# Patient Record
Sex: MALE | Race: WHITE | NOT HISPANIC OR LATINO | Employment: FULL TIME | URBAN - METROPOLITAN AREA
[De-identification: names, ages, dates, MRNs, and addresses within clinical notes are randomized per-mention and may not be internally consistent; named-entity substitution may affect disease eponyms.]

---

## 2019-01-17 ENCOUNTER — TRANSCRIBE ORDERS (OUTPATIENT)
Dept: ADMINISTRATIVE | Facility: HOSPITAL | Age: 49
End: 2019-01-17

## 2019-01-17 ENCOUNTER — HOSPITAL ENCOUNTER (OUTPATIENT)
Dept: RADIOLOGY | Facility: HOSPITAL | Age: 49
Discharge: HOME | End: 2019-01-17
Attending: FAMILY MEDICINE
Payer: COMMERCIAL

## 2019-01-17 DIAGNOSIS — M54.5 LOW BACK PAIN, UNSPECIFIED BACK PAIN LATERALITY, UNSPECIFIED CHRONICITY, WITH SCIATICA PRESENCE UNSPECIFIED: ICD-10-CM

## 2019-01-17 DIAGNOSIS — M54.5 LOW BACK PAIN, UNSPECIFIED BACK PAIN LATERALITY, UNSPECIFIED CHRONICITY, WITH SCIATICA PRESENCE UNSPECIFIED: Primary | ICD-10-CM

## 2019-01-17 PROCEDURE — 72100 X-RAY EXAM L-S SPINE 2/3 VWS: CPT

## 2019-01-17 PROCEDURE — 72202 X-RAY EXAM SI JOINTS 3/> VWS: CPT

## 2019-09-06 ENCOUNTER — HOSPITAL ENCOUNTER (EMERGENCY)
Facility: HOSPITAL | Age: 49
Discharge: HOME | End: 2019-09-06
Attending: EMERGENCY MEDICINE | Admitting: EMERGENCY MEDICINE
Payer: COMMERCIAL

## 2019-09-06 ENCOUNTER — APPOINTMENT (EMERGENCY)
Dept: CT IMAGING | Facility: HOSPITAL | Age: 49
End: 2019-09-06
Payer: COMMERCIAL

## 2019-09-06 VITALS
OXYGEN SATURATION: 97 % | SYSTOLIC BLOOD PRESSURE: 184 MMHG | TEMPERATURE: 97.2 F | BODY MASS INDEX: 37.8 KG/M2 | DIASTOLIC BLOOD PRESSURE: 74 MMHG | HEIGHT: 71 IN | HEART RATE: 68 BPM | WEIGHT: 270 LBS | RESPIRATION RATE: 18 BRPM

## 2019-09-06 DIAGNOSIS — R10.9 RIGHT FLANK PAIN: Primary | ICD-10-CM

## 2019-09-06 DIAGNOSIS — S39.012A LUMBAR STRAIN, INITIAL ENCOUNTER: ICD-10-CM

## 2019-09-06 LAB
ALBUMIN SERPL BCP-MCNC: 4.1 G/DL (ref 3.5–5.7)
ALP SERPL-CCNC: 86 U/L (ref 40–150)
ALT SERPL W P-5'-P-CCNC: 49 U/L (ref 7–52)
ANION GAP SERPL CALCULATED.3IONS-SCNC: 5 MMOL/L (ref 4–13)
AST SERPL W P-5'-P-CCNC: 49 U/L (ref 13–39)
BASOPHILS # BLD AUTO: 0 THOUSANDS/ΜL (ref 0–0.1)
BASOPHILS NFR BLD AUTO: 1 % (ref 0–2)
BILIRUB SERPL-MCNC: 0.6 MG/DL (ref 0.2–1)
BILIRUB UR QL STRIP: NEGATIVE
BUN SERPL-MCNC: 10 MG/DL (ref 7–25)
CALCIUM SERPL-MCNC: 9.3 MG/DL (ref 8.6–10.5)
CHLORIDE SERPL-SCNC: 99 MMOL/L (ref 98–107)
CLARITY UR: CLEAR
CO2 SERPL-SCNC: 32 MMOL/L (ref 21–31)
COLOR UR: YELLOW
CREAT SERPL-MCNC: 0.97 MG/DL (ref 0.7–1.3)
EOSINOPHIL # BLD AUTO: 0.1 THOUSAND/ΜL (ref 0–0.61)
EOSINOPHIL NFR BLD AUTO: 2 % (ref 0–5)
ERYTHROCYTE [DISTWIDTH] IN BLOOD BY AUTOMATED COUNT: 15.5 % (ref 11.5–14.5)
GFR SERPL CREATININE-BSD FRML MDRD: 93 ML/MIN/1.73SQ M
GIANT PLATELETS BLD QL SMEAR: PRESENT
GLUCOSE SERPL-MCNC: 203 MG/DL (ref 65–99)
GLUCOSE UR STRIP-MCNC: NEGATIVE MG/DL
HCT VFR BLD AUTO: 49 % (ref 42–47)
HGB BLD-MCNC: 16 G/DL (ref 14–18)
HGB UR QL STRIP.AUTO: NEGATIVE
KETONES UR STRIP-MCNC: NEGATIVE MG/DL
LEUKOCYTE ESTERASE UR QL STRIP: NEGATIVE
LYMPHOCYTES # BLD AUTO: 1.1 THOUSANDS/ΜL (ref 0.6–4.47)
LYMPHOCYTES NFR BLD AUTO: 27 % (ref 21–51)
MCH RBC QN AUTO: 28.3 PG (ref 26–34)
MCHC RBC AUTO-ENTMCNC: 32.7 G/DL (ref 31–37)
MCV RBC AUTO: 87 FL (ref 81–99)
MONOCYTES # BLD AUTO: 0.5 THOUSAND/ΜL (ref 0.17–1.22)
MONOCYTES NFR BLD AUTO: 13 % (ref 2–12)
NEUTROPHILS # BLD AUTO: 2.4 THOUSANDS/ΜL (ref 1.4–6.5)
NEUTS SEG NFR BLD AUTO: 57 % (ref 42–75)
NITRITE UR QL STRIP: NEGATIVE
PH UR STRIP.AUTO: 6 [PH]
PLATELET # BLD AUTO: 67 THOUSANDS/UL (ref 149–390)
PLATELET BLD QL SMEAR: ADEQUATE
PMV BLD AUTO: 9.7 FL (ref 8.6–11.7)
POTASSIUM SERPL-SCNC: 4 MMOL/L (ref 3.5–5.5)
PROT SERPL-MCNC: 7.6 G/DL (ref 6.4–8.9)
PROT UR STRIP-MCNC: NEGATIVE MG/DL
RBC # BLD AUTO: 5.67 MILLION/UL (ref 4.3–5.9)
RBC MORPH BLD: NORMAL
SODIUM SERPL-SCNC: 136 MMOL/L (ref 134–143)
SP GR UR STRIP.AUTO: 1.01 (ref 1–1.03)
UROBILINOGEN UR QL STRIP.AUTO: 0.2 E.U./DL
WBC # BLD AUTO: 4.1 THOUSAND/UL (ref 4.8–10.8)

## 2019-09-06 PROCEDURE — 85025 COMPLETE CBC W/AUTO DIFF WBC: CPT | Performed by: EMERGENCY MEDICINE

## 2019-09-06 PROCEDURE — 74176 CT ABD & PELVIS W/O CONTRAST: CPT

## 2019-09-06 PROCEDURE — 80053 COMPREHEN METABOLIC PANEL: CPT | Performed by: EMERGENCY MEDICINE

## 2019-09-06 PROCEDURE — 36415 COLL VENOUS BLD VENIPUNCTURE: CPT | Performed by: EMERGENCY MEDICINE

## 2019-09-06 PROCEDURE — 96374 THER/PROPH/DIAG INJ IV PUSH: CPT

## 2019-09-06 PROCEDURE — 81003 URINALYSIS AUTO W/O SCOPE: CPT | Performed by: EMERGENCY MEDICINE

## 2019-09-06 PROCEDURE — 96361 HYDRATE IV INFUSION ADD-ON: CPT

## 2019-09-06 PROCEDURE — 99284 EMERGENCY DEPT VISIT MOD MDM: CPT

## 2019-09-06 RX ORDER — CYCLOBENZAPRINE HCL 10 MG
10 TABLET ORAL 2 TIMES DAILY PRN
Qty: 20 TABLET | Refills: 0 | Status: SHIPPED | OUTPATIENT
Start: 2019-09-06 | End: 2019-10-24

## 2019-09-06 RX ORDER — KETOROLAC TROMETHAMINE 30 MG/ML
30 INJECTION, SOLUTION INTRAMUSCULAR; INTRAVENOUS ONCE
Status: COMPLETED | OUTPATIENT
Start: 2019-09-06 | End: 2019-09-06

## 2019-09-06 RX ORDER — NAPROXEN 500 MG/1
500 TABLET ORAL 2 TIMES DAILY WITH MEALS
Qty: 30 TABLET | Refills: 0 | Status: SHIPPED | OUTPATIENT
Start: 2019-09-06 | End: 2019-10-24

## 2019-09-06 RX ADMIN — KETOROLAC TROMETHAMINE 30 MG: 30 INJECTION, SOLUTION INTRAMUSCULAR; INTRAVENOUS at 15:12

## 2019-09-06 RX ADMIN — SODIUM CHLORIDE 1000 ML: 0.9 INJECTION, SOLUTION INTRAVENOUS at 15:12

## 2019-09-06 NOTE — ED PROVIDER NOTES
History  Chief Complaint   Patient presents with    Flank Pain     pt started with right lower back/flank pain 2 days ago, pain wraps around to right abd  Patient is a 77-year-old male with history of kidney stones the distant past who says he was moving furniture several days ago and now has right flank pain  The pain radiates to his right anterior abdomen  He has been nauseous has not vomited  He denies any frequency urgency or dysuria  Patient has no bowel complaints  He has been afebrile  None       Past Medical History:   Diagnosis Date    Diabetes mellitus ([address] )     Kidney stones        Past Surgical History:   Procedure Laterality Date    CHOLECYSTECTOMY      NECK SURGERY         History reviewed  No pertinent family history  I have reviewed and agree with the history as documented  Social History     Tobacco Use    Smoking status: Never Smoker    Smokeless tobacco: Never Used   Substance Use Topics    Alcohol use: Never     Frequency: Never    Drug use: Never        Review of Systems   Constitutional: Negative for chills, fatigue, fever and unexpected weight change  HENT: Negative for congestion and nosebleeds  Eyes: Negative for visual disturbance  Respiratory: Negative for chest tightness and shortness of breath  Cardiovascular: Negative for chest pain, palpitations and leg swelling  Gastrointestinal: Negative for abdominal pain, blood in stool, diarrhea, nausea and vomiting  Endocrine: Negative for cold intolerance and heat intolerance  Genitourinary: Negative for difficulty urinating  Musculoskeletal: Negative for arthralgias, back pain, gait problem, joint swelling and myalgias  Skin: Negative for rash  Neurological: Negative for dizziness, speech difficulty, weakness and headaches  Psychiatric/Behavioral: Negative for behavioral problems, confusion, self-injury and suicidal ideas  All other systems reviewed and are negative        Physical Exam  Physical Exam   Constitutional: He is oriented to person, place, and time  He appears well-developed and well-nourished  HENT:   Head: Normocephalic and atraumatic  Nose: Nose normal    Eyes: Pupils are equal, round, and reactive to light  EOM are normal    Neck: Normal range of motion  Neck supple  Cardiovascular: Normal rate, regular rhythm and normal heart sounds  Exam reveals no gallop and no friction rub  No murmur heard  Pulmonary/Chest: Effort normal and breath sounds normal  No respiratory distress  He has no wheezes  He has no rales  Abdominal: Soft  He exhibits no distension  There is no tenderness  There is no rebound and no guarding  No CVA tenderness   Musculoskeletal: Normal range of motion  He exhibits no edema  Neurological: He is alert and oriented to person, place, and time  Skin: Skin is warm and dry  Psychiatric: He has a normal mood and affect  His behavior is normal  Judgment and thought content normal    Nursing note and vitals reviewed        Vital Signs  ED Triage Vitals [09/06/19 1432]   Temperature Pulse Respirations Blood Pressure SpO2   (!) 97 2 °F (36 2 °C) 68 18 (!) 184/74 97 %      Temp Source Heart Rate Source Patient Position - Orthostatic VS BP Location FiO2 (%)   Temporal Monitor Sitting Left arm --      Pain Score       4           Vitals:    09/06/19 1432   BP: (!) 184/74   Pulse: 68   Patient Position - Orthostatic VS: Sitting         Visual Acuity      ED Medications  Medications   sodium chloride 0 9 % bolus 1,000 mL (has no administration in time range)   ketorolac (TORADOL) injection 30 mg (has no administration in time range)       Diagnostic Studies  Results Reviewed     Procedure Component Value Units Date/Time    CBC and differential [[id number]]     Lab Status:  No result Specimen:  Blood     Comprehensive metabolic panel [[id number]]     Lab Status:  No result Specimen:  Blood     UA (URINE) with reflex to Microscopic [[id number]]  (Normal) Collected:  09/06/19 1444    Lab Status:  Final result Specimen:  Urine, Clean Catch Updated:  09/06/19 1454     Color, UA Yellow     Clarity, UA Clear     Specific Gravity, UA 1 010     pH, UA 6 0     Leukocytes, UA Negative     Nitrite, UA Negative     Protein, UA Negative mg/dl      Glucose, UA Negative mg/dl      Ketones, UA Negative mg/dl      Urobilinogen, UA 0 2 E U /dl      Bilirubin, UA Negative     Blood, UA Negative                 CT renal stone study abdomen pelvis without contrast    (Results Pending)              Procedures  Procedures       ED Course  ED Course as of Sep 06 1714   Fri Sep 06, 2019   1608   Patient's CT scan does not explain the patient's flank pain  This is likely muscle skeletal   Will discharge home with nonsteroidals and follow up with his PMD    CT renal stone study abdomen pelvis without contrast                               MDM    Disposition  Final diagnoses:   None     ED Disposition     None      Follow-up Information    None         Patient's Medications    No medications on file     No discharge procedures on file      ED Provider  Electronically Signed by           John Taylor MD  09/06/19 2968

## 2019-10-24 ENCOUNTER — OFFICE VISIT (OUTPATIENT)
Dept: INTERNAL MEDICINE CLINIC | Facility: CLINIC | Age: 49
End: 2019-10-24
Payer: COMMERCIAL

## 2019-10-24 VITALS
RESPIRATION RATE: 18 BRPM | BODY MASS INDEX: 37.41 KG/M2 | HEIGHT: 72 IN | HEART RATE: 66 BPM | TEMPERATURE: 98.6 F | WEIGHT: 276.2 LBS | OXYGEN SATURATION: 98 % | DIASTOLIC BLOOD PRESSURE: 80 MMHG | SYSTOLIC BLOOD PRESSURE: 154 MMHG

## 2019-10-24 DIAGNOSIS — L84 CALLUS OF FOOT: ICD-10-CM

## 2019-10-24 DIAGNOSIS — Z01.00 DIABETIC EYE EXAM (HCC): ICD-10-CM

## 2019-10-24 DIAGNOSIS — E78.2 MIXED HYPERLIPIDEMIA: ICD-10-CM

## 2019-10-24 DIAGNOSIS — Z12.5 SCREENING FOR PROSTATE CANCER: ICD-10-CM

## 2019-10-24 DIAGNOSIS — I10 ESSENTIAL HYPERTENSION: ICD-10-CM

## 2019-10-24 DIAGNOSIS — Z13.220 SCREENING FOR LIPID DISORDERS: ICD-10-CM

## 2019-10-24 DIAGNOSIS — E11.9 ENCOUNTER FOR DIABETIC FOOT EXAM (HCC): ICD-10-CM

## 2019-10-24 DIAGNOSIS — E11.9 DIABETIC EYE EXAM (HCC): ICD-10-CM

## 2019-10-24 DIAGNOSIS — Z23 ENCOUNTER FOR VACCINATION: ICD-10-CM

## 2019-10-24 DIAGNOSIS — E11.9 TYPE 2 DIABETES MELLITUS WITHOUT COMPLICATION, WITHOUT LONG-TERM CURRENT USE OF INSULIN (HCC): Primary | ICD-10-CM

## 2019-10-24 DIAGNOSIS — E66.01 SEVERE OBESITY (BMI 35.0-39.9) WITH COMORBIDITY (HCC): ICD-10-CM

## 2019-10-24 DIAGNOSIS — M47.12 CERVICAL SPONDYLOSIS WITH MYELOPATHY: ICD-10-CM

## 2019-10-24 DIAGNOSIS — Z13.29 SCREENING FOR THYROID DISORDER: ICD-10-CM

## 2019-10-24 DIAGNOSIS — Z13.31 NEGATIVE DEPRESSION SCREENING: ICD-10-CM

## 2019-10-24 DIAGNOSIS — Z13.0 SCREENING FOR DEFICIENCY ANEMIA: ICD-10-CM

## 2019-10-24 PROBLEM — M50.30 DDD (DEGENERATIVE DISC DISEASE), CERVICAL: Status: ACTIVE | Noted: 2019-10-24

## 2019-10-24 LAB — SL AMB POCT GLUCOSE BLD: 191

## 2019-10-24 PROCEDURE — 90715 TDAP VACCINE 7 YRS/> IM: CPT | Performed by: INTERNAL MEDICINE

## 2019-10-24 PROCEDURE — 90471 IMMUNIZATION ADMIN: CPT | Performed by: INTERNAL MEDICINE

## 2019-10-24 PROCEDURE — 93000 ELECTROCARDIOGRAM COMPLETE: CPT | Performed by: INTERNAL MEDICINE

## 2019-10-24 PROCEDURE — 90472 IMMUNIZATION ADMIN EACH ADD: CPT | Performed by: INTERNAL MEDICINE

## 2019-10-24 PROCEDURE — 99204 OFFICE O/P NEW MOD 45 MIN: CPT | Performed by: INTERNAL MEDICINE

## 2019-10-24 PROCEDURE — 82948 REAGENT STRIP/BLOOD GLUCOSE: CPT | Performed by: INTERNAL MEDICINE

## 2019-10-24 PROCEDURE — 90686 IIV4 VACC NO PRSV 0.5 ML IM: CPT | Performed by: INTERNAL MEDICINE

## 2019-10-24 NOTE — PATIENT INSTRUCTIONS
Obesity   AMBULATORY CARE:   Obesity  is when your body mass index (BMI) is greater than 30  Your healthcare provider will use your height and weight to measure your BMI  The risks of obesity include  many health problems, such as injuries or physical disability  You may need tests to check for the following:  · Diabetes     · High blood pressure or high cholesterol     · Heart disease     · Gallbladder or liver disease     · Cancer of the colon, breast, prostate, liver, or kidney     · Sleep apnea     · Arthritis or gout  Seek care immediately if:   · You have a severe headache, confusion, or difficulty speaking  · You have weakness on one side of your body  · You have chest pain, sweating, or shortness of breath  Contact your healthcare provider if:   · You have symptoms of gallbladder or liver disease, such as pain in your upper abdomen  · You have knee or hip pain and discomfort while walking  · You have symptoms of diabetes, such as intense hunger and thirst, and frequent urination  · You have symptoms of sleep apnea, such as snoring or daytime sleepiness  · You have questions or concerns about your condition or care  Treatment for obesity  focuses on helping you lose weight to improve your health  Even a small decrease in BMI can reduce the risk for many health problems  Your healthcare provider will help you set a weight-loss goal   · Lifestyle changes  are the first step in treating obesity  These include making healthy food choices and getting regular physical activity  Your healthcare provider may suggest a weight-loss program that involves coaching, education, and therapy  · Medicine  may help you lose weight when it is used with a healthy diet and physical activity  · Surgery  can help you lose weight if you are very obese and have other health problems  There are several types of weight-loss surgery  Ask your healthcare provider for more information    Be successful losing weight:   · Set small, realistic goals  An example of a small goal is to walk for 20 minutes 5 days a week  Anther goal is to lose 5% of your body weight  · Tell friends, family members, and coworkers about your goals  and ask for their support  Ask a friend to lose weight with you, or join a weight-loss support group  · Identify foods or triggers that may cause you to overeat , and find ways to avoid them  Remove tempting high-calorie foods from your home and workplace  Place a bowl of fresh fruit on your kitchen counter  If stress causes you to eat, then find other ways to cope with stress  · Keep a diary to track what you eat and drink  Also write down how many minutes of physical activity you do each day  Weigh yourself once a week and record it in your diary  Eating changes: You will need to eat 500 to 1,000 fewer calories each day than you currently eat to lose 1 to 2 pounds a week  The following changes will help you cut calories:  · Eat smaller portions  Use small plates, no larger than 9 inches in diameter  Fill your plate half full of fruits and vegetables  Measure your food using measuring cups until you know what a serving size looks like  · Eat 3 meals and 1 or 2 snacks each day  Plan your meals in advance  Rebekah Wesley and eat at home most of the time  Eat slowly  · Eat fruits and vegetables at every meal   They are low in calories and high in fiber, which makes you feel full  Do not add butter, margarine, or cream sauce to vegetables  Use herbs to season steamed vegetables  · Eat less fat and fewer fried foods  Eat more baked or grilled chicken and fish  These protein sources are lower in calories and fat than red meat  Limit fast food  Dress your salads with olive oil and vinegar instead of bottled dressing  · Limit the amount of sugar you eat  Do not drink sugary beverages  Limit alcohol  Activity changes:  Physical activity is good for your body in many ways   It helps you burn calories and build strong muscles  It decreases stress and depression, and improves your mood  It can also help you sleep better  Talk to your healthcare provider before you begin an exercise program   · Exercise for at least 30 minutes 5 days a week  Start slowly  Set aside time each day for physical activity that you enjoy and that is convenient for you  It is best to do both weight training and an activity that increases your heart rate, such as walking, bicycling, or swimming  · Find ways to be more active  Do yard work and housecleaning  Walk up the stairs instead of using elevators  Spend your leisure time going to events that require walking, such as outdoor festivals or fairs  This extra physical activity can help you lose weight and keep it off  Follow up with your healthcare provider as directed: You may need to meet with a dietitian  Write down your questions so you remember to ask them during your visits  © 2017 [facility] [employer] Information is for End User's use only and may not be sold, redistributed or otherwise used for commercial purposes  All illustrations and images included in CareNotes® are the copyrighted property of [employer]  or Travon Castellanos  The above information is an  only  It is not intended as medical advice for individual conditions or treatments  Talk to your doctor, nurse or pharmacist before following any medical regimen to see if it is safe and effective for you  Low Fat Diet   AMBULATORY CARE:   A low-fat diet  is an eating plan that is low in total fat, unhealthy fat, and cholesterol  You may need to follow a low-fat diet if you have trouble digesting or absorbing fat  You may also need to follow this diet if you have high cholesterol  You can also lower your cholesterol by increasing the amount of fiber in your diet  Soluble fiber is a type of fiber that helps to decrease cholesterol levels     Different types of fat in food:   · Limit unhealthy fats  A diet that is high in cholesterol, saturated fat, and trans fat may cause unhealthy cholesterol levels  Unhealthy cholesterol levels increase your risk of heart disease  ¨ Cholesterol:  Limit intake of cholesterol to less than 200 mg per day  Cholesterol is found in meat, eggs, and dairy  ¨ Saturated fat:  Limit saturated fat to less than 7% of your total daily calories  Ask your dietitian how many calories you need each day  Saturated fat is found in butter, cheese, ice cream, whole milk, and palm oil  Saturated fat is also found in meat, such as beef, pork, chicken skin, and processed meats  Processed meats include sausage, hot dogs, and bologna  ¨ Trans fat:  Avoid trans fat as much as possible  Trans fat is used in fried and baked foods  Foods that say trans fat free on the label may still have up to 0 5 grams of trans fat per serving  · Include healthy fats  Replace foods that are high in saturated and trans fat with foods high in healthy fats  This may help to decrease high cholesterol levels  ¨ Monounsaturated fats: These are found in avocados, nuts, and vegetable oils, such as olive, canola, and sunflower oil  ¨ Polyunsaturated fats: These can be found in vegetable oils, such as soybean or corn oil  Omega-3 fats can help to decrease the risk of heart disease  Omega-3 fats are found in fish, such as salmon, herring, trout, and tuna  Omega-3 fats can also be found in plant foods, such as walnuts, flaxseed, soybeans, and canola oil    Foods to limit or avoid:   · Grains:      ¨ Snacks that are made with partially hydrogenated oils, such as chips, regular crackers, and butter-flavored popcorn    ¨ High-fat baked goods, such as biscuits, croissants, doughnuts, pies, cookies, and pastries    · Dairy:      ¨ Whole milk, 2% milk, and yogurt and ice cream made with whole milk    ¨ Half and half creamer, heavy cream, and whipping cream    ¨ Cheese, cream cheese, and sour cream    · Meats and proteins:      ¨ High-fat cuts of meat (T-bone steak, regular hamburger, and ribs)    ¨ Fried meat, poultry (turkey and chicken), and fish    ¨ Poultry (chicken and turkey) with skin    ¨ Cold cuts (salami or bologna), hot dogs, kerns, and sausage    ¨ Whole eggs and egg yolks    · Vegetables and fruits with added fat:      ¨ Fried vegetables or vegetables in butter or high-fat sauces, such as cream or cheese sauces    ¨ Fried fruit or fruit served with butter or cream    · Fats:      ¨ Butter, stick margarine, and shortening    ¨ Coconut, palm oil, and palm kernel oil  Foods to include:   · Grains:      ¨ Whole-grain breads, cereals, pasta, and brown rice    ¨ Low-fat crackers and pretzels    · Vegetables and fruits:      ¨ Fresh, frozen, or canned vegetables (no salt or low-sodium)    ¨ Fresh, frozen, dried, or canned fruit (canned in light syrup or fruit juice)    ¨ Avocado    · Low-fat dairy products:      ¨ Nonfat (skim) or 1% milk    ¨ Nonfat or low-fat cheese, yogurt, and cottage cheese    · Meats and proteins:      ¨ Chicken or turkey with no skin    ¨ Baked or broiled fish    ¨ Lean beef and pork (loin, round, extra lean hamburger)    ¨ Beans and peas, unsalted nuts, soy products    ¨ Egg whites and substitutes    ¨ Seeds and nuts    · Fats:      ¨ Unsaturated oil, such as canola, olive, peanut, soybean, or sunflower oil    ¨ Soft or liquid margarine and vegetable oil spread    ¨ Low-fat salad dressing  Other ways to decrease fat:   · Read food labels before you buy foods  Choose foods that have less than 30% of calories from fat  Choose low-fat or fat-free dairy products  Remember that fat free does not mean calorie free  These foods still contain calories, and too many calories can lead to weight gain  · Trim fat from meat and avoid fried food  Trim all visible fat from meat before you cook it  Remove the skin from poultry  Do not vivas meat, fish, or poultry  Bake, roast, boil, or broil these foods instead  Avoid fried foods  Eat a baked potato instead of [place] fries  Steam vegetables instead of sautéing them in butter  · Add less fat to foods  Use imitation kerns bits on salads and baked potatoes instead of regular kerns bits  Use fat-free or low-fat salad dressings instead of regular dressings  Use low-fat or nonfat butter-flavored topping instead of regular butter or margarine on popcorn and other foods  Ways to decrease fat in recipes:  Replace high-fat ingredients with low-fat or nonfat ones  This may cause baked goods to be drier than usual  You may need to use nonfat cooking spray on pans to prevent food from sticking  You also may need to change the amount of other ingredients, such as water, in the recipe  Try the following:  · Use low-fat or light margarine instead of regular margarine or shortening  · Use lean ground turkey breast or chicken, or lean ground beef (less than 5% fat) instead of hamburger  · Add 1 teaspoon of canola oil to 8 ounces of skim milk instead of using cream or half and half  · Use grated zucchini, carrots, or apples in breads instead of coconut  · Use blenderized, low-fat cottage cheese, plain tofu, or low-fat ricotta cheese instead of cream cheese  · Use 1 egg white and 1 teaspoon of canola oil, or use ¼ cup (2 ounces) of fat-free egg substitute instead of a whole egg  · Replace half of the oil that is called for in a recipe with applesauce when you bake  Use 3 tablespoons of cocoa powder and 1 tablespoon of canola oil instead of a square of baking chocolate  How to increase fiber:  Eat enough high-fiber foods to get 20 to 30 grams of fiber every day  Slowly increase your fiber intake to avoid stomach cramps, gas, and other problems  · Eat 3 ounces of whole-grain foods each day  An ounce is about 1 slice of bread  Eat whole-grain breads, such as whole-wheat bread   Whole wheat, whole-wheat flour, or other whole grains should be listed as the first ingredient on the food label  Replace white flour with whole-grain flour or use half of each in recipes  Whole-grain flour is heavier than white flour, so you may have to add more yeast or baking powder  · Eat a high-fiber cereal for breakfast   Oatmeal is a good source of soluble fiber  Look for cereals that have bran or fiber in the name  Choose whole-grain products, such as brown rice, barley, and whole-wheat pasta  · Eat more beans, peas, and lentils  For example, add beans to soups or salads  Eat at least 5 cups of fruits and vegetables each day  Eat fruits and vegetables with the peel because the peel is high in fiber  © 2017 [facility] Information is for End User's use only and may not be sold, redistributed or otherwise used for commercial purposes  All illustrations and images included in CareNotes® are the copyrighted property of [employer]  or Travon Castellanos  The above information is an  only  It is not intended as medical advice for individual conditions or treatments  Talk to your doctor, nurse or pharmacist before following any medical regimen to see if it is safe and effective for you  Heart Healthy Diet   AMBULATORY CARE:   A heart healthy diet  is an eating plan low in total fat, unhealthy fats, and sodium (salt)  A heart healthy diet helps decrease your risk for heart disease and stroke  Limit the amount of fat you eat to 25% to 35% of your total daily calories  Limit sodium to less than 2,300 mg each day  Healthy fats:  Healthy fats can help improve cholesterol levels  The risk for heart disease is decreased when cholesterol levels are normal  Choose healthy fats, such as the following:  · Unsaturated fat  is found in foods such as soybean, canola, olive, corn, and safflower oils  It is also found in soft tub margarine that is made with liquid vegetable oil       · Omega-3 fat  is found in certain fish, such as salmon, tuna, and trout, and in walnuts and flaxseed  Unhealthy fats:  Unhealthy fats can cause unhealthy cholesterol levels in your blood and increase your risk of heart disease  Limit unhealthy fats, such as the following:  · Cholesterol  is found in animal foods, such as eggs and lobster, and in dairy products made from whole milk  Limit cholesterol to less than 300 milligrams (mg) each day  You may need to limit cholesterol to 200 mg each day if you have heart disease  · Saturated fat  is found in meats, such as kerns and hamburger  It is also found in chicken or turkey skin, whole milk, and butter  Limit saturated fat to less than 7% of your total daily calories  Limit saturated fat to less than 6% if you have heart disease or are at increased risk for it  · Trans fat  is found in packaged foods, such as potato chips and cookies  It is also in hard margarine, some fried foods, and shortening  Avoid trans fats as much as possible    Heart healthy foods and drinks to include:  Ask your dietitian or healthcare provider how many servings to have from each of the following food groups:  · Grains:      ¨ Whole-wheat breads, cereals, and pastas, and brown rice    ¨ Low-fat, low-sodium crackers and chips    · Vegetables:      ¨ Broccoli, green beans, green peas, and spinach    ¨ Collards, kale, and lima beans    ¨ Carrots, sweet potatoes, tomatoes, and peppers    ¨ Canned vegetables with no salt added    · Fruits:      ¨ Bananas, peaches, pears, and pineapple    ¨ Grapes, raisins, and dates    ¨ Oranges, tangerines, grapefruit, orange juice, and grapefruit juice    ¨ Apricots, mangoes, melons, and papaya    ¨ Raspberries and strawberries    ¨ Canned fruit with no added sugar    · Low-fat dairy products:      ¨ Nonfat (skim) milk, 1% milk, and low-fat almond, cashew, or soy milks fortified with calcium    ¨ Low-fat cheese, regular or frozen yogurt, and cottage cheese    · Meats and proteins , such as lean cuts of beef and pork (loin, leg, round), skinless chicken and turkey, legumes, soy products, egg whites, and nuts  Foods and drinks to limit or avoid:  Ask your dietitian or healthcare provider about these and other foods that are high in unhealthy fat, sodium, and sugar:  · Snack or packaged foods , such as frozen dinners, cookies, macaroni and cheese, and cereals with more than 300 mg of sodium per serving    · Canned or dry mixes  for cakes, soups, sauces, or gravies    · Vegetables with added sodium , such as instant potatoes, vegetables with added sauces, or regular canned vegetables    · Other foods high in sodium , such as ketchup, barbecue sauce, salad dressing, pickles, olives, soy sauce, and miso    · High-fat dairy foods  such as whole or 2% milk, cream cheese, or sour cream, and cheeses     · High-fat protein foods  such as high-fat cuts of beef (T-bone steaks, ribs), chicken or turkey with skin, and organ meats, such as liver    · Cured or smoked meats , such as hot dogs, kerns, and sausage    · Unhealthy fats and oils , such as butter, stick margarine, shortening, and cooking oils such as coconut or palm oil    · Food and drinks high in sugar , such as soft drinks (soda), sports drinks, sweetened tea, candy, cake, cookies, pies, and doughnuts  Other diet guidelines to follow:   · Eat more foods containing omega-3 fats  Eat fish high in omega-3 fats at least 2 times a week  · Limit alcohol  Too much alcohol can damage your heart and raise your blood pressure  Women should limit alcohol to 1 drink a day  Men should limit alcohol to 2 drinks a day  A drink of alcohol is 12 ounces of beer, 5 ounces of wine, or 1½ ounces of liquor  · Choose low-sodium foods  High-sodium foods can lead to high blood pressure  Add little or no salt to food you prepare  Use herbs and spices in place of salt  · Eat more fiber  to help lower cholesterol levels   Eat at least 5 servings of fruits and vegetables each day  Eat 3 ounces of whole-grain foods each day  Legumes (beans) are also a good source of fiber  Lifestyle guidelines:   · Do not smoke  Nicotine and other chemicals in cigarettes and cigars can cause lung and heart damage  Ask your healthcare provider for information if you currently smoke and need help to quit  E-cigarettes or smokeless tobacco still contain nicotine  Talk to your healthcare provider before you use these products  · Exercise regularly  to help you maintain a healthy weight and improve your blood pressure and cholesterol levels  Ask your healthcare provider about the best exercise plan for you  Do not start an exercise program without asking your healthcare provider  Follow up with your healthcare provider as directed:  Write down your questions so you remember to ask them during your visits  © 2017 [facility] Information is for End User's use only and may not be sold, redistributed or otherwise used for commercial purposes  All illustrations and images included in CareNotes® are the copyrighted property of [employer]  or Travon Castellanos  The above information is an  only  It is not intended as medical advice for individual conditions or treatments  Talk to your doctor, nurse or pharmacist before following any medical regimen to see if it is safe and effective for you  Type 2 Diabetes in Adults   WHAT YOU NEED TO KNOW:   What is type 2 diabetes? Type 2 diabetes is a disease that affects how your body uses glucose (sugar)  Normally, when the blood sugar level increases, the pancreas makes more insulin  Insulin helps move sugar out of the blood so it can be used for energy  Type 2 diabetes develops because either the body cannot make enough insulin, or it cannot use the insulin correctly  After many years, your pancreas may stop making insulin  What increases my risk for type 2 diabetes?    · Obesity    · Physical inactivity    · Older age    · High blood pressure or high cholesterol    · A history of heart disease, gestational diabetes, or polycystic ovary syndrome     · A family member with diabetes    · Being [place] American, , , [place] American, or [facility]  What are the signs and symptoms of type 2 diabetes? You may have high blood sugar levels for a long time before symptoms appear  You may have any of the following:  · More hunger or thirst than usual     · Frequent urination     · Weight loss without trying     · Blurred vision  How is type 2 diabetes diagnosed? You may need tests to check for type 2 diabetes starting at age 39  You may need any of the following:  · An A1c test  shows the average amount of sugar in your blood over the past 2 to 3 months  Your healthcare provider will tell you the A1c level that is right for you  The goal for your A1c is usually below 7%  Your provider can help you make changes if a check shows the A1c is too high  · A fasting plasma glucose test  is when your blood sugar level is tested after you have not eaten for 8 hours  · A 2-hour plasma glucose test  starts with a blood sugar level check after you have not eaten for 8 hours  You are then given a glucose drink  Your blood sugar level is checked after 2 hours  · A random glucose test  may be done any time of day, no matter how long ago you ate  How is type 2 diabetes treated? Type 2 diabetes can be controlled to prevent damage to your heart, blood vessels, and other organs  The goal is to keep your blood sugar at a normal level  You must eat the right foods, and exercise regularly  You may need 1 or more hypoglycemic medicines or insulin if you cannot control your blood sugar level with nutrition and exercise  You may also need medicine to lower your risk for heart disease  An example includes medicine to lower or control your cholesterol  How do I check my blood sugar level?   You will be taught how to check a small drop of blood in a glucose monitor  You will need to check your blood sugar level at least 3 times each day if you are on insulin  Ask your healthcare provider when and how often to check during the day  If you check your blood sugar level before a meal , it should be between 80 and 130 mg/dL  If you check your blood sugar level 1 to 2 hours after a meal , it should be less than 180 mg/dL  Ask your healthcare provider if these are good goals for you  Write down your results, and show them to your healthcare provider  Your provider may use the results to make changes to your medicine, food, and exercise schedules  What should I do if my blood sugar level is too low? Your blood sugar level is too low if it goes below 70 mg/dL  If the level is too low, eat or drink 15 grams of fast-acting carbohydrate  These are found naturally in fruits  Fast-acting carbohydrates will raise your blood sugar level quickly  Examples of 15 grams of fast-acting carbohydrate are 4 ounces (½ cup) of fruit juice or 4 ounces of regular soda  Other examples are 2 tablespoons of raisins or 3 to 4 glucose tablets  Check your blood sugar level 15 minutes later  If the level is still low (less than 100 mg/dL), eat another 15 grams of carbohydrate  When the level returns to 100 mg/dL, eat a snack or meal that contains carbohydrates  This will help prevent another drop in blood sugar  Always carefully follow your healthcare provider's instructions on how to treat low blood sugar levels  What do I need to know about nutrition? A dietitian will help you make a meal plan to keep your blood sugar level steady  Do not skip meals  Your blood sugar level may drop too low if you have taken diabetes medicine and do not eat  · Keep track of carbohydrates (sugar and starchy foods)  Your blood sugar level can get too high if you eat too many carbohydrates  Eat fruits, legumes, vegetables, and whole grains   Your dietitian will help you plan meals and snacks that have the right amount of carbohydrates  · Eat low-fat foods , such as skinless chicken and low-fat milk  · Eat less sodium (salt)  Limit high-sodium foods, such as soy sauce, potato chips, and soup  Do not add salt to food you cook  Limit your use of table salt  You should have less than 2,300 mg of sodium per day  · Eat high-fiber foods , such as vegetables, whole-grain breads, and beans  · Limit alcohol  Alcohol affects your blood sugar level and can make it harder to manage your diabetes  Limit alcohol to 1 drink a day if you are a woman  Limit alcohol to 2 drinks a day if you are a man  A drink of alcohol is 12 ounces of beer, 5 ounces of wine, or 1½ ounces of liquor  How much exercise do I need? Exercise can help keep your blood sugar level steady, decrease your risk of heart disease, and help you lose weight  Stretch before and after you exercise  Exercise for at least 150 minutes every week  Spread this amount of exercise over at least 3 days a week  Do not skip exercise more than 2 days in a row  Include muscle strengthening activities 2 to 3 days each week  Older adults should include balance training 2 to 3 times each week  Activities that help increase balance include yoga and stewart chi  Work with your healthcare provider to create an exercise plan  · Check your blood sugar level before and after exercise  Healthcare providers may tell you to change the amount of insulin you take or food you eat  If your blood sugar level is high, check your blood or urine for ketones before you exercise  Do not exercise if your blood sugar level is high and you have ketones  · If your blood sugar level is less than 100 mg/dL, have a carbohydrate snack before you exercise  Examples are 4 to 6 crackers, ½ banana, 8 ounces (1 cup) of milk, or 4 ounces (½ cup) of juice   Drink water or liquids that do not contain sugar before, during, and after exercise  Ask your dietitian or healthcare provider which liquids you should drink when you exercise  · Do not sit for longer than 30 minutes  If you cannot walk around, at least stand up  This will help you stay active and keep your blood circulating  What else can I do to manage type 2 diabetes? · Check your feet each day for sores  Wear shoes and socks that fit correctly  Do not trim your toenails  Ask your healthcare provider for more information about foot care  · Maintain a healthy weight  Ask your healthcare provider how much you should weigh  A healthy weight can help you control your diabetes and prevent heart disease  Ask your provider to help you create a weight loss plan if you are overweight  Together you can set manageable weight loss goals  · Do not smoke  Nicotine and other chemicals in cigarettes and cigars can cause lung damage and make it more difficult to manage your diabetes  Ask your healthcare provider for information if you currently smoke and need help to quit  Do not use e-cigarettes or smokeless tobacco in place of cigarettes or to help you quit  They still contain nicotine  · Check your blood pressure as directed  Ask your healthcare provider what your blood pressure should be  Most adults with diabetes and high blood pressure should have a systolic blood pressure (first number) less than 140  Your diastolic blood pressure (second number) should be less than 90  · Wear medical alert identification  Wear medical alert jewelry or carry a card that says you have diabetes  Ask your healthcare provider where to get these items  · Ask about vaccines  You have a higher risk for serious illness if you get the flu, pneumonia, or hepatitis  Ask your healthcare provider if you should get a flu, pneumonia, or hepatitis B vaccine, and when to get the vaccine  What are the risks of type 2 diabetes?   Uncontrolled diabetes can damage your nerves, veins, and arteries  High blood sugar levels may damage other body tissue and organs over time  Damage to arteries may increase your risk for heart attack and stroke  Nerve damage may also lead to other heart, stomach, and nerve problems  Diabetes is life-threatening if it is not controlled  Control your blood glucose levels to prevent health problems  Call 911 for any of the following:   · You have any of the following signs of a stroke:      ¨ Numbness or drooping on one side of your face     ¨ Weakness in an arm or leg    ¨ Confusion or difficulty speaking    ¨ Dizziness, a severe headache, or vision loss    · You have any of the following signs of a heart attack:      ¨ Squeezing, pressure, or pain in your chest that lasts longer than 5 minutes or returns    ¨ Discomfort or pain in your back, neck, jaw, stomach, or arm     ¨ Trouble breathing    ¨ Nausea or vomiting    ¨ Lightheadedness or a sudden cold sweat, especially with chest pain or trouble breathing  When should I seek immediate care? · You have severe abdominal pain, or the pain spreads to your back  You may also be vomiting  · You have trouble staying awake or focusing  · You are shaking or sweating  · You have blurred or double vision  · Your breath has a fruity, sweet smell  · Your breathing is deep and labored, or rapid and shallow  · Your heartbeat is fast and weak  When should I contact my healthcare provider? · You are vomiting or have diarrhea  · You have an upset stomach and cannot eat the foods on your meal plan  · You feel weak or more tired than usual      · You feel dizzy, have headaches, or are easily irritated  · Your skin is red, warm, dry, or swollen  · You have a wound that does not heal      · You have numbness in your arms or legs  · You have trouble coping with your illness, or you feel anxious or depressed  · You have questions or concerns about your condition or care    CARE AGREEMENT: You have the right to help plan your care  Learn about your health condition and how it may be treated  Discuss treatment options with your caregivers to decide what care you want to receive  You always have the right to refuse treatment  The above information is an  only  It is not intended as medical advice for individual conditions or treatments  Talk to your doctor, nurse or pharmacist before following any medical regimen to see if it is safe and effective for you  © 2017 [employer]  Information is for End User's use only and may not be sold, redistributed or otherwise used for commercial purposes  All illustrations and images included in CareNotes® are the copyrighted property of [employer]  or [employer] Castellanos

## 2019-10-24 NOTE — PROGRESS NOTES
Assessment/Plan:  Problem List Items Addressed This Visit        Endocrine    Type 2 diabetes mellitus without complication ([place] [address] ) - Primary    Relevant Orders    CBC and differential    Comprehensive metabolic panel    Hemoglobin A1C    Lipid Panel with Direct LDL reflex    Microalbumin / creatinine urine ratio    TSH, 3rd generation with Free T4 reflex    Ambulatory Referral to Ophthalmology    Ambulatory referral to Podiatry    POCT ECG (Completed)    POCT blood glucose (Completed)       Cardiovascular and Mediastinum    Essential hypertension    Relevant Orders    POCT ECG (Completed)       Nervous and Auditory    Cervical spondylosis with myelopathy       Other    Mixed hyperlipidemia    Relevant Orders    Lipid Panel with Direct LDL reflex      Other Visit Diagnoses     Encounter for diabetic foot exam ([place] [address] )        Negative depression screening        Screening for lipid disorders        Screening for thyroid disorder        Screening for deficiency anemia        Screening for prostate cancer        Relevant Orders    PSA, Total Screen    Severe obesity (BMI 35 0-39  9) with comorbidity (HCC)        BMI 37 0-37 9, adult        Encounter for vaccination        Relevant Orders    influenza vaccine, quadrivalent, 0 5 mL, preservative-free (Completed)    TDAP VACCINE GREATER THAN OR EQUAL TO 6YO IM (Completed)    Diabetic eye exam ([place] [address] )        Callus of foot               Diagnoses and all orders for this visit:    Type 2 diabetes mellitus without complication, without long-term current use of insulin ([place] [address] )  -     CBC and differential; Future  -     Comprehensive metabolic panel; Future  -     Hemoglobin A1C; Future  -     Lipid Panel with Direct LDL reflex; Future  -     Microalbumin / creatinine urine ratio  -     TSH, 3rd generation with Free T4 reflex; Future  -     Ambulatory Referral to Ophthalmology; Future  -     Ambulatory referral to Podiatry;  Future  -     POCT ECG  -     POCT blood glucose    Essential hypertension  -     POCT ECG    Cervical spondylosis with myelopathy    Encounter for diabetic foot exam ([facility] [address] )    Negative depression screening    Screening for lipid disorders    Screening for thyroid disorder    Screening for deficiency anemia    Screening for prostate cancer  -     PSA, Total Screen; Future    Severe obesity (BMI 35 0-39  9) with comorbidity (HCC)    BMI 37 0-37 9, adult    Encounter for vaccination  -     influenza vaccine, quadrivalent, 0 5 mL, preservative-free  -     TDAP VACCINE GREATER THAN OR EQUAL TO 8YO IM    Diabetic eye exam ([facility] [address] )    Callus of foot    Mixed hyperlipidemia  -     Lipid Panel with Direct LDL reflex; Future        No problem-specific Assessment & Plan notes found for this encounter  A/P: Ekg w/o acute changes  In house sugar was 191  Doing ok and will check labs  Will get pt started on home monitoring  Discussed BMI and will give information on diet and exercise  Discussed vaccines and will up date the flu and Td  Will refer to [facility] for the foot care and for an eye exam  Get into see a DDS  Hold on starting meds until labs back  RTC two weeks for meds and labs  Subjective:      Patient ID: India Montano is a 54 y o  male  WM, 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, presents to establish  PMH DM, HTN, and cervical DDD  PSH of laminectomy and choly  All to PCN and codeine  Non smoker and non drinker  Doing ok and no c/o's, but hasn't been on meds for several months  Not checking sugars  Remains active w/o difficulty and no falls  Denies depression  No suicidal or violent ideations  Working full time  No recent travel  No fever, chills, or sweats  No unexplained wt change  Denies CP, SOB, palpitations, edema, orthopnea, or PND  No sz or syncope  No changes in bowel or bladder habits  No trouble swallowing  Appetite and sleep are good  Overdue to see both a DDS and an eye doctor  Currently due for labs and vaccine            The following portions of the patient's history were reviewed and updated as appropriate:   He has a past medical history of Diabetes mellitus ([address] ), Hypertension, and Kidney stones  ,  does not have any pertinent problems on file  ,   has a past surgical history that includes Neck surgery and Cholecystectomy  ,  family history includes Breast cancer in his mother; Diabetes in his father, mother, and sister; Fibromyalgia in his sister; Hypertension in his mother  ,   reports that he has never smoked  He has never used smokeless tobacco  He reports that he does not drink alcohol or use drugs  ,  is allergic to codeine and penicillins     No current outpatient medications on file  No current facility-administered medications for this visit  Review of Systems   Constitutional: Negative for activity change, chills, diaphoresis, fatigue and fever  HENT: Negative  Eyes: Negative for visual disturbance  Respiratory: Negative for cough, chest tightness, shortness of breath and wheezing  Cardiovascular: Negative for chest pain, palpitations and leg swelling  Gastrointestinal: Negative for abdominal pain, constipation, diarrhea, nausea and vomiting  Endocrine: Negative for cold intolerance and heat intolerance  Genitourinary: Negative for difficulty urinating, dysuria and frequency  Musculoskeletal: Negative for arthralgias, gait problem and myalgias  Neurological: Negative for dizziness, seizures, syncope, weakness, light-headedness, numbness and headaches  Psychiatric/Behavioral: Negative for confusion, dysphoric mood and sleep disturbance  The patient is not nervous/anxious          PHQ-9 Depression Screening    PHQ-9:    Frequency of the following problems over the past two weeks:       Little interest or pleasure in doing things:  0 - not at all  Feeling down, depressed, or hopeless:  0 - not at all  PHQ-2 Score:  0        Objective:  Vitals:    10/24/19 0848   BP: 154/80   BP Location: Left arm   Patient Position: Sitting   Cuff Size: Large Pulse: 66   Resp: 18   Temp: 98 6 °F (37 °C)   SpO2: 98%   Weight: 125 kg (276 lb 3 2 oz)   Height: 6' (1 829 m)     Body mass index is 37 46 kg/m²  Physical Exam   Constitutional: He is oriented to person, place, and time  He appears well-developed and well-nourished  No distress  HENT:   Head: Normocephalic and atraumatic  Mouth/Throat: Oropharynx is clear and moist    Eyes: Pupils are equal, round, and reactive to light  Conjunctivae and EOM are normal    Neck: Normal range of motion  Neck supple  No JVD present  No tracheal deviation present  No thyromegaly present  Cardiovascular: Normal rate, regular rhythm and normal heart sounds  Pulses are no weak pulses  Pulses:       Dorsalis pedis pulses are 1+ on the right side, and 1+ on the left side  Posterior tibial pulses are 1+ on the right side, and 1+ on the left side  Pulmonary/Chest: Effort normal and breath sounds normal  No respiratory distress  He has no wheezes  He has no rales  Abdominal: Soft  Bowel sounds are normal  He exhibits no distension  There is no tenderness  Musculoskeletal: Normal range of motion  He exhibits no edema, tenderness or deformity  Feet:   Right Foot:   Skin Integrity: Negative for ulcer, skin breakdown, erythema, warmth, callus or dry skin  Left Foot:   Skin Integrity: Positive for callus  Negative for ulcer, skin breakdown, erythema, warmth or dry skin  Lymphadenopathy:     He has no cervical adenopathy  Neurological: He is alert and oriented to person, place, and time  He displays normal reflexes  No cranial nerve deficit  He exhibits normal muscle tone  Coordination normal    Psychiatric: He has a normal mood and affect  His behavior is normal  Judgment and thought content normal    Nursing note and vitals reviewed  Patient's shoes and socks removed  Right Foot/Ankle   Right Foot Inspection  Skin Exam: skin normal and skin intact no dry skin, no warmth, no callus, no erythema, no maceration, no abnormal color, no pre-ulcer, no ulcer and no callus                          Toe Exam: ROM and strength within normal limitsno swelling, no tenderness, erythema and  no right toe deformity  Sensory       Monofilament testing: intact  Vascular  Capillary refills: < 3 seconds  The right DP pulse is 1+  The right PT pulse is 1+  Left Foot/Ankle  Left Foot Inspection  Skin Exam: callusskin not intact, no dry skin, no warmth, no erythema, no maceration, normal color, no pre-ulcer and no ulcer                         Toe Exam: ROM and strength within normal limitsno swelling, no tenderness, no erythema and no left toe deformity                   Sensory       Monofilament: intact  Vascular  Capillary refills: < 3 seconds  The left DP pulse is 1+  The left PT pulse is 1+  Assign Risk Category:  Deformity present; No loss of protective sensation; No weak pulses       Risk: 1    BMI Counseling: Body mass index is 37 46 kg/m²  The BMI is above normal  Nutrition recommendations include reducing portion sizes, decreasing overall calorie intake, reducing intake of saturated fat and trans fat and reducing intake of cholesterol  Exercise recommendations include moderate aerobic physical activity for 150 minutes/week

## 2019-10-30 ENCOUNTER — TELEPHONE (OUTPATIENT)
Dept: INTERNAL MEDICINE CLINIC | Facility: CLINIC | Age: 49
End: 2019-10-30

## 2019-10-30 NOTE — TELEPHONE ENCOUNTER
----- Message from Rishi Soto DO sent at 10/24/2019  9:15 AM EDT -----  Get pt set up for home sugar monitoring

## 2019-11-04 ENCOUNTER — APPOINTMENT (OUTPATIENT)
Dept: LAB | Facility: CLINIC | Age: 49
End: 2019-11-04
Payer: COMMERCIAL

## 2019-11-04 DIAGNOSIS — E78.2 MIXED HYPERLIPIDEMIA: ICD-10-CM

## 2019-11-04 DIAGNOSIS — E11.9 TYPE 2 DIABETES MELLITUS WITHOUT COMPLICATION, WITHOUT LONG-TERM CURRENT USE OF INSULIN (HCC): ICD-10-CM

## 2019-11-04 DIAGNOSIS — Z12.5 SCREENING FOR PROSTATE CANCER: ICD-10-CM

## 2019-11-04 LAB
ALBUMIN SERPL BCP-MCNC: 3.4 G/DL (ref 3.5–5)
ALP SERPL-CCNC: 112 U/L (ref 46–116)
ALT SERPL W P-5'-P-CCNC: 59 U/L (ref 12–78)
ANION GAP SERPL CALCULATED.3IONS-SCNC: 2 MMOL/L (ref 4–13)
AST SERPL W P-5'-P-CCNC: 42 U/L (ref 5–45)
BASOPHILS # BLD AUTO: 0.06 THOUSANDS/ΜL (ref 0–0.1)
BASOPHILS NFR BLD AUTO: 1 % (ref 0–1)
BILIRUB SERPL-MCNC: 0.98 MG/DL (ref 0.2–1)
BUN SERPL-MCNC: 18 MG/DL (ref 5–25)
CALCIUM SERPL-MCNC: 8.6 MG/DL (ref 8.3–10.1)
CHLORIDE SERPL-SCNC: 104 MMOL/L (ref 100–108)
CHOLEST SERPL-MCNC: 153 MG/DL (ref 50–200)
CO2 SERPL-SCNC: 33 MMOL/L (ref 21–32)
CREAT SERPL-MCNC: 0.99 MG/DL (ref 0.6–1.3)
CREAT UR-MCNC: 110 MG/DL
EOSINOPHIL # BLD AUTO: 0.05 THOUSAND/ΜL (ref 0–0.61)
EOSINOPHIL NFR BLD AUTO: 1 % (ref 0–6)
ERYTHROCYTE [DISTWIDTH] IN BLOOD BY AUTOMATED COUNT: 14.2 % (ref 11.6–15.1)
EST. AVERAGE GLUCOSE BLD GHB EST-MCNC: 166 MG/DL
GFR SERPL CREATININE-BSD FRML MDRD: 90 ML/MIN/1.73SQ M
GLUCOSE P FAST SERPL-MCNC: 138 MG/DL (ref 65–99)
HBA1C MFR BLD: 7.4 % (ref 4.2–6.3)
HCT VFR BLD AUTO: 52.3 % (ref 36.5–49.3)
HDLC SERPL-MCNC: 58 MG/DL
HGB BLD-MCNC: 16.1 G/DL (ref 12–17)
IMM GRANULOCYTES # BLD AUTO: 0.01 THOUSAND/UL (ref 0–0.2)
IMM GRANULOCYTES NFR BLD AUTO: 0 % (ref 0–2)
LDLC SERPL CALC-MCNC: 83 MG/DL (ref 0–100)
LYMPHOCYTES # BLD AUTO: 1.6 THOUSANDS/ΜL (ref 0.6–4.47)
LYMPHOCYTES NFR BLD AUTO: 35 % (ref 14–44)
MCH RBC QN AUTO: 27.2 PG (ref 26.8–34.3)
MCHC RBC AUTO-ENTMCNC: 30.8 G/DL (ref 31.4–37.4)
MCV RBC AUTO: 89 FL (ref 82–98)
MICROALBUMIN UR-MCNC: 46.1 MG/L (ref 0–20)
MICROALBUMIN/CREAT 24H UR: 42 MG/G CREATININE (ref 0–30)
MONOCYTES # BLD AUTO: 0.54 THOUSAND/ΜL (ref 0.17–1.22)
MONOCYTES NFR BLD AUTO: 12 % (ref 4–12)
NEUTROPHILS # BLD AUTO: 2.34 THOUSANDS/ΜL (ref 1.85–7.62)
NEUTS SEG NFR BLD AUTO: 51 % (ref 43–75)
NRBC BLD AUTO-RTO: 0 /100 WBCS
PLATELET # BLD AUTO: 90 THOUSANDS/UL (ref 149–390)
PMV BLD AUTO: 12.3 FL (ref 8.9–12.7)
POTASSIUM SERPL-SCNC: 4.7 MMOL/L (ref 3.5–5.3)
PROT SERPL-MCNC: 7.3 G/DL (ref 6.4–8.2)
PSA SERPL-MCNC: 0.4 NG/ML (ref 0–4)
RBC # BLD AUTO: 5.91 MILLION/UL (ref 3.88–5.62)
SODIUM SERPL-SCNC: 139 MMOL/L (ref 136–145)
TRIGL SERPL-MCNC: 61 MG/DL
TSH SERPL DL<=0.05 MIU/L-ACNC: 0.97 UIU/ML (ref 0.36–3.74)
WBC # BLD AUTO: 4.6 THOUSAND/UL (ref 4.31–10.16)

## 2019-11-04 PROCEDURE — 82043 UR ALBUMIN QUANTITATIVE: CPT | Performed by: INTERNAL MEDICINE

## 2019-11-04 PROCEDURE — 80061 LIPID PANEL: CPT

## 2019-11-04 PROCEDURE — 85025 COMPLETE CBC W/AUTO DIFF WBC: CPT

## 2019-11-04 PROCEDURE — 36415 COLL VENOUS BLD VENIPUNCTURE: CPT

## 2019-11-04 PROCEDURE — 80053 COMPREHEN METABOLIC PANEL: CPT

## 2019-11-04 PROCEDURE — 84443 ASSAY THYROID STIM HORMONE: CPT

## 2019-11-04 PROCEDURE — 83036 HEMOGLOBIN GLYCOSYLATED A1C: CPT

## 2019-11-04 PROCEDURE — G0103 PSA SCREENING: HCPCS

## 2019-11-04 PROCEDURE — 82570 ASSAY OF URINE CREATININE: CPT | Performed by: INTERNAL MEDICINE

## 2019-11-07 ENCOUNTER — OFFICE VISIT (OUTPATIENT)
Dept: INTERNAL MEDICINE CLINIC | Facility: CLINIC | Age: 49
End: 2019-11-07
Payer: COMMERCIAL

## 2019-11-07 VITALS
DIASTOLIC BLOOD PRESSURE: 78 MMHG | BODY MASS INDEX: 36.44 KG/M2 | SYSTOLIC BLOOD PRESSURE: 138 MMHG | WEIGHT: 269 LBS | TEMPERATURE: 98.5 F | HEART RATE: 74 BPM | RESPIRATION RATE: 16 BRPM | HEIGHT: 72 IN

## 2019-11-07 DIAGNOSIS — J01.10 ACUTE NON-RECURRENT FRONTAL SINUSITIS: Primary | ICD-10-CM

## 2019-11-07 PROCEDURE — 3008F BODY MASS INDEX DOCD: CPT | Performed by: INTERNAL MEDICINE

## 2019-11-07 PROCEDURE — 99213 OFFICE O/P EST LOW 20 MIN: CPT | Performed by: INTERNAL MEDICINE

## 2019-11-07 RX ORDER — GUAIFENESIN 600 MG
600 TABLET, EXTENDED RELEASE 12 HR ORAL EVERY 12 HOURS SCHEDULED
Qty: 20 TABLET | Refills: 0 | Status: SHIPPED | OUTPATIENT
Start: 2019-11-07 | End: 2019-12-27

## 2019-11-07 RX ORDER — BENZONATATE 200 MG/1
200 CAPSULE ORAL 3 TIMES DAILY PRN
Qty: 20 CAPSULE | Refills: 0 | Status: SHIPPED | OUTPATIENT
Start: 2019-11-07 | End: 2019-12-27

## 2019-11-07 RX ORDER — FLUTICASONE PROPIONATE 50 MCG
1 SPRAY, SUSPENSION (ML) NASAL DAILY
Qty: 16 G | Refills: 0 | Status: SHIPPED | OUTPATIENT
Start: 2019-11-07 | End: 2019-11-29 | Stop reason: SDUPTHER

## 2019-11-07 RX ORDER — LEVOFLOXACIN 500 MG/1
500 TABLET, FILM COATED ORAL EVERY 24 HOURS
Qty: 10 TABLET | Refills: 0 | Status: SHIPPED | OUTPATIENT
Start: 2019-11-07 | End: 2019-11-17

## 2019-11-07 NOTE — PATIENT INSTRUCTIONS
Sinusitis   AMBULATORY CARE:   Sinusitis  is inflammation or infection of your sinuses  It is most often caused by a virus  Acute sinusitis may last up to 12 weeks  Chronic sinusitis lasts longer than 12 weeks  Recurrent sinusitis means you have 4 or more times in 1 year  Common symptoms include the following:   · Fever    · Pain, pressure, redness, or swelling around the forehead, cheeks, or eyes    · Thick yellow or green discharge from your nose    · Tenderness when you touch your face over your sinuses    · Dry cough that happens mostly at night or when you lie down    · Headache and face pain that is worse when you lean forward    · Tooth pain, or pain when you chew  Seek care immediately if:   · Your eye and eyelid are red, swollen, and painful  · You cannot open your eye  · You have vision changes, such as double vision  · Your eyeball bulges out or you cannot move your eye  · You are more sleepy than normal, or you notice changes in your ability to think, move, or talk  · You have a stiff neck, a fever, or a bad headache  · You have swelling of your forehead or scalp  Contact your healthcare provider if:   · Your symptoms do not improve after 3 days  · Your symptoms do not go away after 10 days  · You have nausea and are vomiting  · Your nose is bleeding  · You have questions or concerns about your condition or care  Treatment for sinusitis:  Your symptoms may go away on their own  Your healthcare provider may recommend watchful waiting for up to 10 days before starting antibiotics  You may  need any of the following:  · Acetaminophen  decreases pain and fever  It is available without a doctor's order  Ask how much to take and how often to take it  Follow directions  Read the labels of all other medicines you are using to see if they also contain acetaminophen, or ask your doctor or pharmacist  Acetaminophen can cause liver damage if not taken correctly   Do not use more than 4 grams (4,000 milligrams) total of acetaminophen in one day  · NSAIDs , such as ibuprofen, help decrease swelling, pain, and fever  This medicine is available with or without a doctor's order  NSAIDs can cause stomach bleeding or kidney problems in certain people  If you take blood thinner medicine, always ask your healthcare provider if NSAIDs are safe for you  Always read the medicine label and follow directions  · Nasal steroid sprays  may help decrease inflammation in your nose and sinuses  · Decongestants  help reduce swelling and drain mucus in the nose and sinuses  They may help you breathe easier  · Antihistamines  help dry mucus in the nose and relieve sneezing  · Antibiotics  help treat or prevent a bacterial infection  · Take your medicine as directed  Contact your healthcare provider if you think your medicine is not helping or if you have side effects  Tell him or her if you are allergic to any medicine  Keep a list of the medicines, vitamins, and herbs you take  Include the amounts, and when and why you take them  Bring the list or the pill bottles to follow-up visits  Carry your medicine list with you in case of an emergency  Self-care:   · Rinse your sinuses  Use a sinus rinse device to rinse your nasal passages with a saline (salt water) solution or distilled water  Do not use tap water  This will help thin the mucus in your nose and rinse away pollen and dirt  It will also help reduce swelling so you can breathe normally  Ask your healthcare provider how often to do this  · Breathe in steam   Heat a bowl of water until you see steam  Lean over the bowl and make a tent over your head with a large towel  Breathe deeply for about 20 minutes  Be careful not to get too close to the steam or burn yourself  Do this 3 times a day  You can also breathe deeply when you take a hot shower  · Sleep with your head elevated    Place an extra pillow under your head before you go to sleep to help your sinuses drain  · Drink liquids as directed  Ask your healthcare provider how much liquid to drink each day and which liquids are best for you  Liquids will thin the mucus in your nose and help it drain  Avoid drinks that contain alcohol or caffeine  · Do not smoke, and avoid secondhand smoke  Nicotine and other chemicals in cigarettes and cigars can make your symptoms worse  Ask your healthcare provider for information if you currently smoke and need help to quit  E-cigarettes or smokeless tobacco still contain nicotine  Talk to your healthcare provider before you use these products  Prevent the spread of germs that cause sinusitis:  Wash your hands often with soap and water  Wash your hands after you use the bathroom, change a child's diaper, or sneeze  Wash your hands before you prepare or eat food  Follow up with your healthcare provider as directed: You may be referred to an ear, nose, and throat specialist  Write down your questions so you remember to ask them during your visits  © 2017 [facility] Information is for End User's use only and may not be sold, redistributed or otherwise used for commercial purposes  All illustrations and images included in CareNotes® are the copyrighted property of [employer]  or Trav[employer] Castellanos  The above information is an  only  It is not intended as medical advice for individual conditions or treatments  Talk to your doctor, nurse or pharmacist before following any medical regimen to see if it is safe and effective for you

## 2019-11-07 NOTE — PROGRESS NOTES
Assessment/Plan:  Problem List Items Addressed This Visit     None      Visit Diagnoses     Acute non-recurrent frontal sinusitis    -  Primary    Relevant Medications    levofloxacin (LEVAQUIN) 500 mg tablet    guaiFENesin (MUCINEX) 600 mg 12 hr tablet    fluticasone (FLONASE) 50 mcg/act nasal spray    benzonatate (TESSALON) 200 MG capsule           Diagnoses and all orders for this visit:    Acute non-recurrent frontal sinusitis  -     levofloxacin (LEVAQUIN) 500 mg tablet; Take 1 tablet (500 mg total) by mouth every 24 hours for 10 days  -     guaiFENesin (MUCINEX) 600 mg 12 hr tablet; Take 1 tablet (600 mg total) by mouth every 12 (twelve) hours  -     fluticasone (FLONASE) 50 mcg/act nasal spray; 1 spray into each nostril daily  -     benzonatate (TESSALON) 200 MG capsule; Take 1 capsule (200 mg total) by mouth 3 (three) times a day as needed for cough        No problem-specific Assessment & Plan notes found for this encounter  A/P: Rest and increase po fluids  OTC PRN motrin or tylenol  Will start abx, INS, cough medicine, and mucinex  RTC as scheduled or PRN  Subjective:  Non smoking WM presents for a several day h/o URI s/s  No travel  No else ill  Denies fever/chills  No sore throat  Notes PND, nasal congestion, headache, and productive green cough  No SOB  Intermittent wheezing  Patient ID: Rose Garnett is a 54 y o  male  HPI    The following portions of the patient's history were reviewed and updated as appropriate:   He has a past medical history of Diabetes mellitus ([address] ), Hypertension, and Kidney stones  ,  does not have any pertinent problems on file  ,   has a past surgical history that includes Neck surgery and Cholecystectomy  ,  family history includes Breast cancer in his mother; Diabetes in his father, mother, and sister; Fibromyalgia in his sister; Hypertension in his mother  ,   reports that he has never smoked   He has never used smokeless tobacco  He reports that he does not drink alcohol or use drugs  ,  is allergic to codeine and penicillins     Current Outpatient Medications   Medication Sig Dispense Refill    benzonatate (TESSALON) 200 MG capsule Take 1 capsule (200 mg total) by mouth 3 (three) times a day as needed for cough 20 capsule 0    fluticasone (FLONASE) 50 mcg/act nasal spray 1 spray into each nostril daily 16 g 0    guaiFENesin (MUCINEX) 600 mg 12 hr tablet Take 1 tablet (600 mg total) by mouth every 12 (twelve) hours 20 tablet 0    levofloxacin (LEVAQUIN) 500 mg tablet Take 1 tablet (500 mg total) by mouth every 24 hours for 10 days 10 tablet 0     No current facility-administered medications for this visit  Review of Systems   Constitutional: Positive for activity change  Negative for chills, diaphoresis, fatigue and fever  HENT: Positive for congestion, postnasal drip, rhinorrhea and sinus pressure  Negative for ear discharge, ear pain, facial swelling, sinus pain and sore throat  Respiratory: Positive for cough and wheezing  Negative for chest tightness and shortness of breath  Cardiovascular: Negative for chest pain, palpitations and leg swelling  Gastrointestinal: Negative for abdominal pain, constipation, diarrhea, nausea and vomiting  Genitourinary: Negative for difficulty urinating, dysuria and frequency  Musculoskeletal: Negative for arthralgias, gait problem and myalgias  Neurological: Positive for headaches  Negative for light-headedness  Psychiatric/Behavioral: Negative for confusion  The patient is not nervous/anxious          PHQ-9 Depression Screening    PHQ-9:    Frequency of the following problems over the past two weeks:       Little interest or pleasure in doing things:  0 - not at all  Feeling down, depressed, or hopeless:  0 - not at all  PHQ-2 Score:  0        Objective:  Vitals:    11/07/19 1034   BP: 138/78   Pulse: 74   Resp: 16   Temp: 98 5 °F (36 9 °C)   TempSrc: Tympanic   Weight: 122 kg (269 lb)   Height: 6' (1 829 m)     Body mass index is 36 48 kg/m²  Physical Exam   Constitutional: He is oriented to person, place, and time  He appears well-developed and well-nourished  No distress  HENT:   Head: Normocephalic and atraumatic  Right Ear: External ear normal    Left Ear: External ear normal    Mouth/Throat: Oropharynx is clear and moist  No oropharyngeal exudate  Sinus tenderness w/ turbinates red and inflamed  Cobblestoning noted  Eyes: Pupils are equal, round, and reactive to light  Conjunctivae and EOM are normal    Neck: Neck supple  Cardiovascular: Normal rate, regular rhythm and normal heart sounds  Pulmonary/Chest: Effort normal and breath sounds normal  No respiratory distress  He has no wheezes  He has no rales  Lymphadenopathy:     He has no cervical adenopathy  Neurological: He is alert and oriented to person, place, and time  Psychiatric: He has a normal mood and affect  His behavior is normal  Judgment and thought content normal    Nursing note and vitals reviewed

## 2019-11-09 ENCOUNTER — APPOINTMENT (EMERGENCY)
Dept: RADIOLOGY | Facility: HOSPITAL | Age: 49
End: 2019-11-09
Payer: COMMERCIAL

## 2019-11-09 ENCOUNTER — HOSPITAL ENCOUNTER (EMERGENCY)
Facility: HOSPITAL | Age: 49
Discharge: HOME | End: 2019-11-09
Attending: EMERGENCY MEDICINE | Admitting: EMERGENCY MEDICINE
Payer: COMMERCIAL

## 2019-11-09 VITALS
OXYGEN SATURATION: 96 % | RESPIRATION RATE: 18 BRPM | BODY MASS INDEX: 36.57 KG/M2 | HEIGHT: 72 IN | DIASTOLIC BLOOD PRESSURE: 72 MMHG | WEIGHT: 270 LBS | HEART RATE: 72 BPM | SYSTOLIC BLOOD PRESSURE: 142 MMHG | TEMPERATURE: 96.9 F

## 2019-11-09 DIAGNOSIS — S82.202A CLOSED LEFT TIBIAL FRACTURE: Primary | ICD-10-CM

## 2019-11-09 PROCEDURE — 99283 EMERGENCY DEPT VISIT LOW MDM: CPT

## 2019-11-09 PROCEDURE — 99284 EMERGENCY DEPT VISIT MOD MDM: CPT | Performed by: PHYSICIAN ASSISTANT

## 2019-11-09 PROCEDURE — 73590 X-RAY EXAM OF LOWER LEG: CPT

## 2019-11-09 PROCEDURE — 29515 APPLICATION SHORT LEG SPLINT: CPT | Performed by: PHYSICIAN ASSISTANT

## 2019-11-09 PROCEDURE — 73564 X-RAY EXAM KNEE 4 OR MORE: CPT

## 2019-11-09 PROCEDURE — 73610 X-RAY EXAM OF ANKLE: CPT

## 2019-11-09 NOTE — ED PROVIDER NOTES
History  Chief Complaint   Patient presents with    Ankle Swelling     pt slid down a set of steps approx 2-3 and twisted the left ankle  denies hitting head or back  Left ankle noted with swelling      Patient presents to the emergency department today for evaluation of left leg pain  He presents via private vehicle utilizing a wheelchair in the care of family  He states he was descending a flight of steps when he soft and the left knee bent backwards  He primarily complains left ankle pain and swelling however does admit to some knee pain as well  Denies striking his head neck or back or any other injuries associated with the fall  Denies prior orthopedic injuries  Prior to Admission Medications   Prescriptions Last Dose Informant Patient Reported?  Taking?   benzonatate (TESSALON) 200 MG capsule Not Taking at Unknown time  No No   Sig: Take 1 capsule (200 mg total) by mouth 3 (three) times a day as needed for cough   Patient not taking: Reported on 2019   fluticasone (FLONASE) 50 mcg/act nasal spray Not Taking at Unknown time  No No   Si spray into each nostril daily   Patient not taking: Reported on 2019   guaiFENesin (MUCINEX) 600 mg 12 hr tablet 2019 at Unknown time  No Yes   Sig: Take 1 tablet (600 mg total) by mouth every 12 (twelve) hours   levofloxacin (LEVAQUIN) 500 mg tablet 2019 at Unknown time  No Yes   Sig: Take 1 tablet (500 mg total) by mouth every 24 hours for 10 days      Facility-Administered Medications: None       Past Medical History:   Diagnosis Date    Diabetes mellitus ([address] )     Hypertension     Kidney stones        Past Surgical History:   Procedure Laterality Date    CHOLECYSTECTOMY      NECK SURGERY         Family History   Problem Relation Age of Onset    Hypertension Mother     Diabetes Mother     Breast cancer Mother     Diabetes Father     Diabetes Sister     Fibromyalgia Sister      I have reviewed and agree with the history as documented  Social History     Tobacco Use    Smoking status: Never Smoker    Smokeless tobacco: Never Used   Substance Use Topics    Alcohol use: Never     Frequency: Never    Drug use: Never        Review of Systems   Constitutional: Negative  Negative for activity change, appetite change, chills, diaphoresis, fatigue, fever and unexpected weight change  HENT: Negative  Negative for sore throat, trouble swallowing and voice change  Eyes: Negative  Respiratory: Negative  Negative for cough, chest tightness, shortness of breath and wheezing  Cardiovascular: Negative  Negative for chest pain, palpitations and leg swelling  Gastrointestinal: Negative  Negative for abdominal pain, blood in stool, nausea and vomiting  Endocrine: Negative  Genitourinary: Negative  Negative for flank pain and hematuria  Musculoskeletal: Negative  Negative for arthralgias, back pain, gait problem, joint swelling, myalgias, neck pain and neck stiffness  Left lower leg pain   Skin: Negative  Negative for rash and wound  Allergic/Immunologic: Negative  Neurological: Negative  Negative for dizziness, seizures, syncope, weakness, light-headedness and headaches  Hematological: Negative  Psychiatric/Behavioral: Negative  All other systems reviewed and are negative  Physical Exam  Physical Exam   Constitutional: He is oriented to person, place, and time  Vital signs are normal  He appears well-developed and well-nourished  He does not have a sickly appearance  He does not appear ill  No distress  HENT:   Right Ear: External ear normal  No swelling  Tympanic membrane is not bulging  Left Ear: External ear normal  No swelling  Tympanic membrane is not bulging  Nose: Nose normal    Mouth/Throat: Oropharynx is clear and moist  No oropharyngeal exudate  Eyes: Pupils are equal, round, and reactive to light  Conjunctivae, EOM and lids are normal    Neck: Normal range of motion   Neck supple  No JVD present  No tracheal deviation, no edema and normal range of motion present  No thyromegaly present  Cardiovascular: Normal rate, regular rhythm, normal heart sounds, intact distal pulses and normal pulses  Exam reveals no gallop and no friction rub  No murmur heard  Pulmonary/Chest: Effort normal and breath sounds normal  No stridor  No respiratory distress  He has no wheezes  He has no rales  He exhibits no tenderness  Abdominal: Soft  Bowel sounds are normal  He exhibits no distension and no mass  There is no tenderness  There is no rebound, no guarding and negative Cruz's sign  No hernia  Musculoskeletal: Normal range of motion  He exhibits edema and tenderness  He exhibits no deformity  Legs:  Lymphadenopathy:     He has no cervical adenopathy  Neurological: He is alert and oriented to person, place, and time  He has normal strength and normal reflexes  No cranial nerve deficit or sensory deficit  GCS eye subscore is 4  GCS verbal subscore is 5  GCS motor subscore is 6  Skin: Skin is warm and dry  Capillary refill takes less than 2 seconds  No rash noted  He is not diaphoretic  No erythema  No pallor  Psychiatric: He has a normal mood and affect  His speech is normal and behavior is normal    Vitals reviewed  Vital Signs  ED Triage Vitals [11/09/19 1310]   Temperature Pulse Respirations Blood Pressure SpO2   (!) 96 9 °F (36 1 °C) 72 18 142/72 96 %      Temp src Heart Rate Source Patient Position - Orthostatic VS BP Location FiO2 (%)   -- -- -- -- --      Pain Score       4           Vitals:    11/09/19 1310   BP: 142/72   Pulse: 72         Visual Acuity      ED Medications  Medications - No data to display    Diagnostic Studies  Results Reviewed     None                 XR knee 4+ vw left injury   ED Interpretation by Chitra Radford PA-C (11/09 1421)   No evidence of acute osseous abnormality noted        Final Result by Sydnee Moran MD (11/09 1523)      Normal left knee  Workstation performed: [id number]         XR tibia fibula 2 views LEFT   ED Interpretation by Melanie Garcia PA-C (11/09 1422)   Potential avulsion fracture noted of the distal tibia lateral view  No proximal tib-fib abnormalities noted  Final Result by Dixie Rosen MD (11/09 1527)      Normal examination  Workstation performed: [id number]         XR ankle 3+ views LEFT   ED Interpretation by Melanie Garcia PA-C (11/09 1422)   Patient has potential for distal tibial avulsion fracture noted  Will mobilize keep nonweightbearing him follow with Orthopedics for repeat imaging  Final Result by Dixie Rosen MD (11/09 1526)      No convincing evidence of acute fracture  Workstation performed: [id number]                    Procedures  Procedures       ED Course                               MDM    Disposition  Final diagnoses:   Closed left tibial fracture     Time reflects when diagnosis was documented in both MDM as applicable and the Disposition within this note     Time User Action Codes Description Comment    11/9/2019  2:23 PM Sobia Mcleod Add [K26 [id number]] Closed left tibial fracture       ED Disposition     ED Disposition Condition Date/Time Comment    Discharge Stable Sat Nov 9, 2019  2:23 PM Sade Salas discharge to home/self care              Follow-up Information     Follow up With Specialties Details Why Contact Info Additional [address] [facility] Schedule an appointment as soon as possible for a visit   [address] [facility] [id number]  [address] [place] [address] Specialists [address], [place], [place], [address]          Discharge Medication List as of 11/9/2019  2:30 PM      CONTINUE these medications which have NOT CHANGED    Details   guaiFENesin (MUCINEX) 600 mg 12 hr tablet Take 1 tablet (600 mg total) by mouth every 12 (twelve) hours, Starting Thu 11/7/2019, Normal      levofloxacin (LEVAQUIN) 500 mg tablet Take 1 tablet (500 mg total) by mouth every 24 hours for 10 days, Starting Thu 11/7/2019, Until Sun 11/17/2019, Normal      benzonatate (TESSALON) 200 MG capsule Take 1 capsule (200 mg total) by mouth 3 (three) times a day as needed for cough, Starting Thu 11/7/2019, Normal      fluticasone (FLONASE) 50 mcg/act nasal spray 1 spray into each nostril daily, Starting Thu 11/7/2019, Normal           No discharge procedures on file      ED Provider  Electronically Signed by           Paulie Walekr PA-C  11/09/19 Kevon Stoddard PA-C  11/09/19 5774

## 2019-11-11 DIAGNOSIS — Z71.89 COMPLEX CARE COORDINATION: Primary | ICD-10-CM

## 2019-11-12 ENCOUNTER — PATIENT OUTREACH (OUTPATIENT)
Dept: INTERNAL MEDICINE CLINIC | Facility: CLINIC | Age: 49
End: 2019-11-12

## 2019-11-26 ENCOUNTER — HOSPITAL ENCOUNTER (OUTPATIENT)
Dept: NON INVASIVE DIAGNOSTICS | Facility: HOSPITAL | Age: 49
Discharge: HOME | End: 2019-11-26
Attending: PODIATRIST
Payer: COMMERCIAL

## 2019-11-26 ENCOUNTER — TRANSCRIBE ORDERS (OUTPATIENT)
Dept: ADMINISTRATIVE | Facility: HOSPITAL | Age: 49
End: 2019-11-26

## 2019-11-26 DIAGNOSIS — I82.4Z2 DEEP VEIN THROMBOSIS (DVT) OF DISTAL VEIN OF LEFT LOWER EXTREMITY, UNSPECIFIED CHRONICITY (HCC): ICD-10-CM

## 2019-11-26 DIAGNOSIS — I82.4Z2 DEEP VEIN THROMBOSIS (DVT) OF DISTAL VEIN OF LEFT LOWER EXTREMITY, UNSPECIFIED CHRONICITY (HCC): Primary | ICD-10-CM

## 2019-11-26 DIAGNOSIS — I82.402 DEEP VEIN THROMBOSIS (DVT) OF LEFT LOWER EXTREMITY, UNSPECIFIED CHRONICITY, UNSPECIFIED VEIN (HCC): Primary | ICD-10-CM

## 2019-11-26 PROCEDURE — 93971 EXTREMITY STUDY: CPT | Performed by: SURGERY

## 2019-11-26 PROCEDURE — 93971 EXTREMITY STUDY: CPT

## 2019-11-29 DIAGNOSIS — J01.10 ACUTE NON-RECURRENT FRONTAL SINUSITIS: ICD-10-CM

## 2019-11-30 RX ORDER — FLUTICASONE PROPIONATE 50 MCG
SPRAY, SUSPENSION (ML) NASAL
Qty: 16 ML | Refills: 0 | Status: SHIPPED | OUTPATIENT
Start: 2019-11-30 | End: 2019-12-27

## 2019-12-27 ENCOUNTER — OFFICE VISIT (OUTPATIENT)
Dept: INTERNAL MEDICINE CLINIC | Facility: CLINIC | Age: 49
End: 2019-12-27
Payer: COMMERCIAL

## 2019-12-27 VITALS
HEART RATE: 91 BPM | HEIGHT: 72 IN | RESPIRATION RATE: 18 BRPM | OXYGEN SATURATION: 97 % | TEMPERATURE: 98.2 F | WEIGHT: 274 LBS | SYSTOLIC BLOOD PRESSURE: 158 MMHG | BODY MASS INDEX: 37.11 KG/M2 | DIASTOLIC BLOOD PRESSURE: 80 MMHG

## 2019-12-27 DIAGNOSIS — M25.552 PAIN OF LEFT HIP JOINT: ICD-10-CM

## 2019-12-27 DIAGNOSIS — M54.42 ACUTE LEFT-SIDED LOW BACK PAIN WITH LEFT-SIDED SCIATICA: Primary | ICD-10-CM

## 2019-12-27 PROCEDURE — 99214 OFFICE O/P EST MOD 30 MIN: CPT | Performed by: INTERNAL MEDICINE

## 2019-12-27 PROCEDURE — 3008F BODY MASS INDEX DOCD: CPT | Performed by: INTERNAL MEDICINE

## 2019-12-27 PROCEDURE — 1036F TOBACCO NON-USER: CPT | Performed by: INTERNAL MEDICINE

## 2019-12-27 PROCEDURE — 96372 THER/PROPH/DIAG INJ SC/IM: CPT | Performed by: INTERNAL MEDICINE

## 2019-12-27 RX ORDER — PREDNISONE 10 MG/1
TABLET ORAL
Qty: 50 TABLET | Refills: 0 | Status: SHIPPED | OUTPATIENT
Start: 2019-12-27 | End: 2020-02-08

## 2019-12-27 RX ORDER — CYCLOBENZAPRINE HCL 10 MG
10 TABLET ORAL 3 TIMES DAILY PRN
Qty: 30 TABLET | Refills: 0 | Status: SHIPPED | OUTPATIENT
Start: 2019-12-27 | End: 2020-02-08

## 2019-12-27 RX ORDER — DEXAMETHASONE SODIUM PHOSPHATE 4 MG/ML
4 INJECTION, SOLUTION INTRA-ARTICULAR; INTRALESIONAL; INTRAMUSCULAR; INTRAVENOUS; SOFT TISSUE ONCE
Status: COMPLETED | OUTPATIENT
Start: 2019-12-27 | End: 2019-12-27

## 2019-12-27 RX ADMIN — DEXAMETHASONE SODIUM PHOSPHATE 4 MG: 4 INJECTION, SOLUTION INTRA-ARTICULAR; INTRALESIONAL; INTRAMUSCULAR; INTRAVENOUS; SOFT TISSUE at 10:12

## 2019-12-27 NOTE — PROGRESS NOTES
Assessment/Plan:  Problem List Items Addressed This Visit     None      Visit Diagnoses     Acute left-sided low back pain with left-sided sciatica    -  Primary    Relevant Medications    cyclobenzaprine (FLEXERIL) 10 mg tablet    predniSONE 10 mg tablet    dexamethasone (DECADRON) injection 4 mg (Start on 12/27/2019 10:15 AM)    Other Relevant Orders    Ambulatory referral to Physical Therapy    Pain of left hip joint        Relevant Medications    cyclobenzaprine (FLEXERIL) 10 mg tablet    predniSONE 10 mg tablet    dexamethasone (DECADRON) injection 4 mg (Start on 12/27/2019 10:15 AM)    Other Relevant Orders    Ambulatory referral to Physical Therapy           Diagnoses and all orders for this visit:    Acute left-sided low back pain with left-sided sciatica  -     cyclobenzaprine (FLEXERIL) 10 mg tablet; Take 1 tablet (10 mg total) by mouth 3 (three) times a day as needed for muscle spasms  -     predniSONE 10 mg tablet; 40mg po q d x 5, then 30mg po q d x 5, then 20mg po q d x 5, then 10mg po q d x 5, then d/c   -     Ambulatory referral to Physical Therapy; Future  -     dexamethasone (DECADRON) injection 4 mg    Pain of left hip joint  -     cyclobenzaprine (FLEXERIL) 10 mg tablet; Take 1 tablet (10 mg total) by mouth 3 (three) times a day as needed for muscle spasms  -     predniSONE 10 mg tablet; 40mg po q d x 5, then 30mg po q d x 5, then 20mg po q d x 5, then 10mg po q d x 5, then d/c   -     Ambulatory referral to Physical Therapy; Future  -     dexamethasone (DECADRON) injection 4 mg        No problem-specific Assessment & Plan notes found for this encounter  A/P: Rest and ice/heat  Will hold on imaging and start steroid wean and muscle relaxants  Told to not us any NSAID's  RTC two weeks for f/u and may need imaging if fails to improve  Subjective:      Patient ID: Oriana Ramirez is a 54 y o  male      Diabetic WM presents with his relative for several weeks of left LBP with radiation into the hip and down the leg after slipping down several steps  Was seen in the ER in 11/19 for similar s/s and imaging of the left leg was negative, but was placed in an immobilizer and referred to ortho  No treatment other than NSAID's  Reports the LBP just became bad about a week ago  No new trauma  No problems with bowel or bladder habits  Reports prior lower back issues in the past  Pain is a 7/10  The following portions of the patient's history were reviewed and updated as appropriate:   He has a past medical history of Diabetes mellitus ([address] ), Hypertension, and Kidney stones  ,  does not have any pertinent problems on file  ,   has a past surgical history that includes Neck surgery and Cholecystectomy  ,  family history includes Breast cancer in his mother; Diabetes in his father, mother, and sister; Fibromyalgia in his sister; Hypertension in his mother  ,   reports that he has never smoked  He has never used smokeless tobacco  He reports that he does not drink alcohol or use drugs  ,  is allergic to codeine and penicillins     Current Outpatient Medications   Medication Sig Dispense Refill    cyclobenzaprine (FLEXERIL) 10 mg tablet Take 1 tablet (10 mg total) by mouth 3 (three) times a day as needed for muscle spasms 30 tablet 0    predniSONE 10 mg tablet 40mg po q d x 5, then 30mg po q d x 5, then 20mg po q d x 5, then 10mg po q d x 5, then d/c  50 tablet 0     Current Facility-Administered Medications   Medication Dose Route Frequency Provider Last Rate Last Dose    dexamethasone (DECADRON) injection 4 mg  4 mg Intramuscular Once Alvira Session, DO           Review of Systems   Constitutional: Positive for activity change  Negative for chills, diaphoresis, fatigue and fever  Respiratory: Negative for cough, chest tightness, shortness of breath and wheezing  Cardiovascular: Negative for chest pain, palpitations and leg swelling     Gastrointestinal: Negative for abdominal pain, constipation, diarrhea, nausea and vomiting  Genitourinary: Negative for difficulty urinating, dysuria and frequency  Musculoskeletal: Positive for arthralgias, back pain and gait problem  Negative for joint swelling and myalgias  Neurological: Negative for weakness, light-headedness and headaches  Psychiatric/Behavioral: Negative for confusion  The patient is not nervous/anxious  PHQ-9 Depression Screening    PHQ-9:    Frequency of the following problems over the past two weeks:             Objective:  Vitals:    12/27/19 0952   BP: 158/80   Pulse: 91   Resp: 18   Temp: 98 2 °F (36 8 °C)   TempSrc: Tympanic   SpO2: 97%   Weight: 124 kg (274 lb)   Height: 6' (1 829 m)     Body mass index is 37 16 kg/m²  Physical Exam   Constitutional: He is oriented to person, place, and time  He appears well-developed and well-nourished  No distress  HENT:   Head: Normocephalic and atraumatic  Mouth/Throat: Oropharynx is clear and moist    Eyes: Pupils are equal, round, and reactive to light  Conjunctivae and EOM are normal    Musculoskeletal: He exhibits tenderness  He exhibits no edema or deformity  LS Spine w/o gross deformities, increase temp, erythema, swelling, or lesions  Tenderness Left of L4-S1 and over the left SI  ROM wnl with increase tenderness all planes at 50%  Spasms noted  LE strength 5/5 with tone/ROM WNL  DTR 2/4  No gait abnormalities(toe/heel walking)  Left hip joint w/o any gross deformities, increase temp, erythema, or swelling  No crepitus  No effusions or ballotment  Joint integrity intact  ROM wnl  Tenderness none  Neurological: He is alert and oriented to person, place, and time  Psychiatric: He has a normal mood and affect  His behavior is normal  Judgment and thought content normal    Nursing note and vitals reviewed

## 2020-02-08 ENCOUNTER — HOSPITAL ENCOUNTER (EMERGENCY)
Facility: HOSPITAL | Age: 50
Discharge: HOME | End: 2020-02-08
Attending: EMERGENCY MEDICINE
Payer: COMMERCIAL

## 2020-02-08 VITALS
BODY MASS INDEX: 38.5 KG/M2 | SYSTOLIC BLOOD PRESSURE: 196 MMHG | TEMPERATURE: 97.5 F | OXYGEN SATURATION: 99 % | WEIGHT: 275 LBS | HEIGHT: 71 IN | HEART RATE: 78 BPM | DIASTOLIC BLOOD PRESSURE: 93 MMHG | RESPIRATION RATE: 16 BRPM

## 2020-02-08 DIAGNOSIS — M54.50 ACUTE LOW BACK PAIN: Primary | ICD-10-CM

## 2020-02-08 PROCEDURE — 96372 THER/PROPH/DIAG INJ SC/IM: CPT

## 2020-02-08 PROCEDURE — 99283 EMERGENCY DEPT VISIT LOW MDM: CPT

## 2020-02-08 PROCEDURE — 99284 EMERGENCY DEPT VISIT MOD MDM: CPT | Performed by: PHYSICIAN ASSISTANT

## 2020-02-08 RX ORDER — KETOROLAC TROMETHAMINE 30 MG/ML
15 INJECTION, SOLUTION INTRAMUSCULAR; INTRAVENOUS ONCE
Status: COMPLETED | OUTPATIENT
Start: 2020-02-08 | End: 2020-02-08

## 2020-02-08 RX ORDER — ACETAMINOPHEN 325 MG/1
975 TABLET ORAL ONCE
Status: COMPLETED | OUTPATIENT
Start: 2020-02-08 | End: 2020-02-08

## 2020-02-08 RX ORDER — LIDOCAINE 50 MG/G
1 PATCH TOPICAL DAILY
Qty: 6 PATCH | Refills: 0 | Status: SHIPPED | OUTPATIENT
Start: 2020-02-08 | End: 2020-02-12

## 2020-02-08 RX ORDER — CYCLOBENZAPRINE HCL 10 MG
10 TABLET ORAL 2 TIMES DAILY PRN
Qty: 20 TABLET | Refills: 0 | Status: SHIPPED | OUTPATIENT
Start: 2020-02-08 | End: 2020-02-12

## 2020-02-08 RX ADMIN — ACETAMINOPHEN 975 MG: 325 TABLET ORAL at 10:38

## 2020-02-08 RX ADMIN — KETOROLAC TROMETHAMINE 15 MG: 30 INJECTION, SOLUTION INTRAMUSCULAR; INTRAVENOUS at 10:38

## 2020-02-08 NOTE — DISCHARGE INSTRUCTIONS
Apply heat or ice as needed to low back for pain relief  Take Tylenol or Motrin every 6 hours as needed  Follow-up with comprehensive spine and your family doctor  If you experience any bowel or bladder incontinence, significant lower leg weakness or numbness when you wipe after using the bathroom please return to the emergency department immediately

## 2020-02-08 NOTE — ED PROVIDER NOTES
History  Chief Complaint   Patient presents with    Back Pain     left lower back down the leg, ongoing for about 2 months ago, had a fall in novmeber and this is when the pain originally started  Tylenol at home, helps shortly  worse when lying on the left side and extremes in temperatures make pain worse      82-year-old male history of type 2 diabetes presents complaining of low back pain  He states that he fell back in November and has had back pain ever since  It is associated with paresthesias down his left buttock and posterior thigh  He denies bowel or bladder incontinence, saddle anesthesia or lower leg weakness  He denies IV drug use  He is taking extra-strength Tylenol with mild relief  He has had Toradol injections in the past with adequate short-term relief of pain  He reports no trauma to his head neck or back since November  He has had previous lumbar spine x-rays since this fall with no acute injury  He states that the pain has been manageable since November however yesterday he missed work due to the pain in his low back with associated sciatica pain  None       Past Medical History:   Diagnosis Date    Diabetes mellitus ([facility] [address] )     Hypertension     Kidney stones        Past Surgical History:   Procedure Laterality Date    CHOLECYSTECTOMY      NECK SURGERY         Family History   Problem Relation Age of Onset    Hypertension Mother     Diabetes Mother    [place] Breast cancer Mother     Diabetes Father     Diabetes Sister     Fibromyalgia Sister      I have reviewed and agree with the history as documented  Social History     Tobacco Use    Smoking status: Never Smoker    Smokeless tobacco: Never Used   Substance Use Topics    Alcohol use: Never     Frequency: Never    Drug use: Never        Review of Systems   Constitutional: Negative for chills, fatigue and fever  HENT: Negative for congestion and sore throat  Eyes: Negative for pain     Respiratory: Negative for cough, chest tightness, shortness of breath and wheezing  Cardiovascular: Negative for chest pain, palpitations and leg swelling  Gastrointestinal: Negative for abdominal pain, constipation, diarrhea, nausea and vomiting  Endocrine: Negative for polyuria  Genitourinary: Negative for dysuria  Musculoskeletal: Positive for back pain  Negative for arthralgias, myalgias and neck pain  Skin: Negative for rash  Neurological: Negative for dizziness, syncope, light-headedness and headaches  All other systems reviewed and are negative  Physical Exam  Physical Exam   Constitutional: He is oriented to person, place, and time  He appears well-developed and well-nourished  HENT:   Head: Normocephalic and atraumatic  Eyes: EOM are normal    Neck: Normal range of motion  Cardiovascular: Normal rate, regular rhythm, normal heart sounds and intact distal pulses  Pulmonary/Chest: Effort normal and breath sounds normal    Abdominal: Soft  Bowel sounds are normal  There is no tenderness  Musculoskeletal: Normal range of motion  Negative straight leg raise test bilaterally  5/5 strength in bilateral lower extremities  Lower extremity reflexes intact bilaterally  Sensation to light touch present and equal in bilateral lower extremities   Neurological: He is alert and oriented to person, place, and time  Skin: Skin is warm and dry  Capillary refill takes less than 2 seconds  Psychiatric: He has a normal mood and affect  Vitals reviewed        Vital Signs  ED Triage Vitals [02/08/20 1017]   Temperature Pulse Respirations Blood Pressure SpO2   97 5 °F (36 4 °C) 78 16 (!) 196/93 99 %      Temp Source Heart Rate Source Patient Position - Orthostatic VS BP Location FiO2 (%)   Temporal Monitor Sitting Left arm --      Pain Score       4           Vitals:    02/08/20 1017   BP: (!) 196/93   Pulse: 78   Patient Position - Orthostatic VS: Sitting         Visual Acuity      ED Medications  Medications acetaminophen (TYLENOL) tablet 975 mg (975 mg Oral Given 2/8/20 1038)   ketorolac (TORADOL) injection 15 mg (15 mg Intramuscular Given 2/8/20 1038)       Diagnostic Studies  Results Reviewed     None                 No orders to display              Procedures  Procedures         ED Course  ED Course as of Feb 08 1106   Sat Feb 08, 2020   1106 Patient has elevated blood pressure noted  He denied any headache, visual changes, chest pain or abdominal pain  Recommended follow-up with family doctor in regards to low back pain and hypertension  [facility]  Number of Diagnoses or Management Options  Acute low back pain:   Diagnosis management comments: Patient discharged with follow-up with comprehensive spine and family doctor  Given his previously poor medical compliance was not given steroids due to his type 2 diabetes  Patient had no positive findings on exam   Given Toradol injection and Tylenol which he tolerated well and was discharged with topical Voltaren gel, lighted derm patch is and Flexeril as needed  Patient agreeable to plan  Disposition  Final diagnoses:   Acute low back pain     Time reflects when diagnosis was documented in both MDM as applicable and the Disposition within this note     Time User Action Codes Description Comment    2/8/2020 10:30 AM Rosa Suarez [M54 5] Acute low back pain       ED Disposition     ED Disposition Condition Date/Time Comment    Discharge Stable Sat Feb 8, 2020 10:32 AM Holly Burks discharge to home/self care              Follow-up Information     Follow up With Specialties Details Why Contact Info    Magy Jay DO Internal Medicine Schedule an appointment as soon as possible for a visit   [address] [place]  [phone]            Patient's Medications   Discharge Prescriptions    CYCLOBENZAPRINE (FLEXERIL) 10 MG TABLET    Take 1 tablet (10 mg total) by mouth 2 (two) times a day as needed for muscle spasms       Start Date: 2/8/2020  End Date: --       Order Dose: 10 mg       Quantity: 20 tablet    Refills: 0    DICLOFENAC SODIUM (VOLTAREN) 1 %    Apply 2 g topically 4 (four) times a day for 10 days       Start Date: 2/8/2020  End Date: 2/18/2020       Order Dose: 2 g       Quantity: 80 g    Refills: 0    LIDOCAINE (LIDODERM) 5 %    Apply 1 patch topically daily for 6 days Remove & Discard patch within 12 hours or as directed by MD       Start Date: 2/8/2020  End Date: 2/14/2020       Order Dose: 1 patch       Quantity: 6 patch    Refills: 0         ED Provider  Electronically Signed by           Majorie Cheadle, PA-C  02/08/20 [facility]  02/08/20 7600

## 2020-02-12 ENCOUNTER — APPOINTMENT (OUTPATIENT)
Dept: RADIOLOGY | Facility: CLINIC | Age: 50
End: 2020-02-12
Payer: COMMERCIAL

## 2020-02-12 ENCOUNTER — OFFICE VISIT (OUTPATIENT)
Dept: INTERNAL MEDICINE CLINIC | Facility: CLINIC | Age: 50
End: 2020-02-12
Payer: COMMERCIAL

## 2020-02-12 VITALS
BODY MASS INDEX: 38.61 KG/M2 | HEART RATE: 82 BPM | WEIGHT: 275.8 LBS | RESPIRATION RATE: 18 BRPM | HEIGHT: 71 IN | SYSTOLIC BLOOD PRESSURE: 176 MMHG | DIASTOLIC BLOOD PRESSURE: 92 MMHG | TEMPERATURE: 98.2 F | OXYGEN SATURATION: 97 %

## 2020-02-12 DIAGNOSIS — M54.42 ACUTE LEFT-SIDED LOW BACK PAIN WITH LEFT-SIDED SCIATICA: Primary | ICD-10-CM

## 2020-02-12 DIAGNOSIS — M54.42 ACUTE LEFT-SIDED LOW BACK PAIN WITH LEFT-SIDED SCIATICA: ICD-10-CM

## 2020-02-12 PROCEDURE — 3008F BODY MASS INDEX DOCD: CPT | Performed by: INTERNAL MEDICINE

## 2020-02-12 PROCEDURE — 3080F DIAST BP >= 90 MM HG: CPT | Performed by: INTERNAL MEDICINE

## 2020-02-12 PROCEDURE — 99213 OFFICE O/P EST LOW 20 MIN: CPT | Performed by: INTERNAL MEDICINE

## 2020-02-12 PROCEDURE — 72100 X-RAY EXAM L-S SPINE 2/3 VWS: CPT

## 2020-02-12 PROCEDURE — 3077F SYST BP >= 140 MM HG: CPT | Performed by: INTERNAL MEDICINE

## 2020-02-12 PROCEDURE — 1036F TOBACCO NON-USER: CPT | Performed by: INTERNAL MEDICINE

## 2020-02-12 RX ORDER — DICLOFENAC SODIUM 75 MG/1
75 TABLET, DELAYED RELEASE ORAL 2 TIMES DAILY
Qty: 60 TABLET | Refills: 0 | Status: SHIPPED | OUTPATIENT
Start: 2020-02-12 | End: 2020-07-20

## 2020-02-12 NOTE — PROGRESS NOTES
Assessment/Plan:    Problem List Items Addressed This Visit     None      Visit Diagnoses     Acute left-sided low back pain with left-sided sciatica    -  Primary    Relevant Medications    diclofenac (VOLTAREN) 75 mg EC tablet    Other Relevant Orders    XR spine lumbar 2 or 3 views injury    Ambulatory referral to Pain Management           Diagnoses and all orders for this visit:    Acute left-sided low back pain with left-sided sciatica  -     XR spine lumbar 2 or 3 views injury; Future  -     diclofenac (VOLTAREN) 75 mg EC tablet; Take 1 tablet (75 mg total) by mouth 2 (two) times a day  -     Ambulatory referral to Pain Management; Future    Other orders  -     Cancel: POCT hemoglobin A1c        No problem-specific Assessment & Plan notes found for this encounter  The patient will follow up in the next several weeks and see how things go  Subjective:      Patient ID: Yuridia Agee is a 54 y o  male  Back Pain   This is a chronic problem  The current episode started more than 1 month ago  The problem occurs constantly  The problem is unchanged  The pain is present in the lumbar spine  The pain radiates to the right thigh  The pain is moderate  The pain is the same all the time  The symptoms are aggravated by bending, lying down, sitting and twisting  Associated symptoms include numbness and paresthesias  Pertinent negatives include no abdominal pain, bladder incontinence, bowel incontinence, chest pain, dysuria, fever, headaches, leg pain, paresis, pelvic pain, perianal numbness, tingling, weakness or weight loss  Risk factors include poor posture, recent trauma, sedentary lifestyle, lack of exercise and obesity  He has tried NSAIDs and muscle relaxant for the symptoms  The treatment provided mild relief  The following portions of the patient's history were reviewed and updated as appropriate:   He has a past medical history of Diabetes mellitus ([address] ), Hypertension, and Kidney stones  ,  does not have any pertinent problems on file  ,   has a past surgical history that includes Neck surgery and Cholecystectomy  ,  family history includes Breast cancer in his mother; Diabetes in his father, mother, and sister; Fibromyalgia in his sister; Hypertension in his mother  ,   reports that he has never smoked  He has never used smokeless tobacco  He reports that he does not drink alcohol or use drugs  ,  is allergic to codeine and penicillins     Current Outpatient Medications   Medication Sig Dispense Refill    diclofenac (VOLTAREN) 75 mg EC tablet Take 1 tablet (75 mg total) by mouth 2 (two) times a day 60 tablet 0     No current facility-administered medications for this visit  Review of Systems   Constitutional: Negative for chills, fatigue, fever and weight loss  HENT: Negative  Respiratory: Negative for cough, chest tightness and shortness of breath  Cardiovascular: Negative for chest pain and palpitations  Gastrointestinal: Negative for abdominal pain, bowel incontinence, constipation, diarrhea, nausea and vomiting  Genitourinary: Negative  Negative for bladder incontinence, dysuria and pelvic pain  Musculoskeletal: Positive for back pain and myalgias  Skin: Negative  Neurological: Positive for numbness and paresthesias  Negative for tingling, weakness and headaches  Psychiatric/Behavioral: Negative for dysphoric mood  The patient is not nervous/anxious  Objective:  Vitals:    02/12/20 1206   BP: (!) 176/92   BP Location: Left arm   Patient Position: Sitting   Cuff Size: Large   Pulse: 82   Resp: 18   Temp: 98 2 °F (36 8 °C)   SpO2: 97%   Weight: 125 kg (275 lb 12 8 oz)   Height: 5' 11" (1 803 m)     Body mass index is 38 47 kg/m²  Physical Exam   Constitutional: He is oriented to person, place, and time  He appears well-developed and well-nourished  HENT:   Head: Normocephalic and atraumatic  Eyes: Pupils are equal, round, and reactive to light   EOM are normal  Neck: Normal range of motion  Neck supple  Cardiovascular: Normal rate, regular rhythm, normal heart sounds and intact distal pulses  No murmur heard  Pulmonary/Chest: Effort normal and breath sounds normal  No stridor  He has no rales  Abdominal: Soft  Bowel sounds are normal  He exhibits no distension  There is no tenderness  Musculoskeletal: Normal range of motion  He exhibits tenderness  He exhibits no edema or deformity  Neurological: He is alert and oriented to person, place, and time  He displays normal reflexes  No sensory deficit  He exhibits normal muscle tone  Coordination normal    (+) right SLR   Skin: Skin is warm and dry  Psychiatric: He has a normal mood and affect  Nursing note and vitals reviewed         PHQ-9 Depression Screening    PHQ-9:    Frequency of the following problems over the past two weeks:

## 2020-02-12 NOTE — LETTER
February 12, 2020     Patient: Shahbaz Lance   YOB: 1970   Date of Visit: 2/12/2020       To Whom it May Concern:    Anthony Joel is under my professional care  He was seen in my office on 2/12/2020  He may return to work on 2/20/20  If you have any questions or concerns, please don't hesitate to call           Sincerely,          Bernard Mccray DO        CC: No Recipients

## 2020-02-19 ENCOUNTER — OFFICE VISIT (OUTPATIENT)
Dept: INTERNAL MEDICINE CLINIC | Facility: CLINIC | Age: 50
End: 2020-02-19
Payer: COMMERCIAL

## 2020-02-19 VITALS
SYSTOLIC BLOOD PRESSURE: 152 MMHG | OXYGEN SATURATION: 96 % | DIASTOLIC BLOOD PRESSURE: 88 MMHG | BODY MASS INDEX: 37.63 KG/M2 | HEIGHT: 71 IN | RESPIRATION RATE: 16 BRPM | WEIGHT: 268.8 LBS | HEART RATE: 87 BPM | TEMPERATURE: 99.3 F

## 2020-02-19 DIAGNOSIS — M54.42 ACUTE LEFT-SIDED LOW BACK PAIN WITH LEFT-SIDED SCIATICA: Primary | ICD-10-CM

## 2020-02-19 PROCEDURE — 3077F SYST BP >= 140 MM HG: CPT | Performed by: INTERNAL MEDICINE

## 2020-02-19 PROCEDURE — 3079F DIAST BP 80-89 MM HG: CPT | Performed by: INTERNAL MEDICINE

## 2020-02-19 PROCEDURE — 1036F TOBACCO NON-USER: CPT | Performed by: INTERNAL MEDICINE

## 2020-02-19 PROCEDURE — 3008F BODY MASS INDEX DOCD: CPT | Performed by: INTERNAL MEDICINE

## 2020-02-19 PROCEDURE — 99213 OFFICE O/P EST LOW 20 MIN: CPT | Performed by: INTERNAL MEDICINE

## 2020-02-19 RX ORDER — CYCLOBENZAPRINE HCL 10 MG
10 TABLET ORAL 3 TIMES DAILY PRN
Qty: 42 TABLET | Refills: 0 | Status: SHIPPED | OUTPATIENT
Start: 2020-02-19 | End: 2020-07-20

## 2020-02-19 RX ORDER — PREDNISONE 20 MG/1
TABLET ORAL
Qty: 20 TABLET | Refills: 0 | Status: SHIPPED | OUTPATIENT
Start: 2020-02-19 | End: 2020-07-20

## 2020-02-19 NOTE — PROGRESS NOTES
Assessment/Plan:    Problem List Items Addressed This Visit     None      Visit Diagnoses     Acute left-sided low back pain with left-sided sciatica    -  Primary    Relevant Medications    cyclobenzaprine (FLEXERIL) 10 mg tablet    predniSONE 20 mg tablet    Other Relevant Orders    Ambulatory referral to Physical Therapy           Diagnoses and all orders for this visit:    Acute left-sided low back pain with left-sided sciatica  -     Ambulatory referral to Physical Therapy; Future  -     cyclobenzaprine (FLEXERIL) 10 mg tablet; Take 1 tablet (10 mg total) by mouth 3 (three) times a day as needed for muscle spasms for up to 14 days  -     predniSONE 20 mg tablet; 3 Tabs PO QDay x 3 Days, Then 2 Tabs PO QDay x 3 Days, Then 1 Tab PO QDay x 3 Days, Then 1/2 Tab PO QDay x 4 Days, Then Stop        No problem-specific Assessment & Plan notes found for this encounter  Subjective:      Patient ID: Jeimy Benitez is a 54 y o  male  Back Pain   This is a new problem  The current episode started 1 to 4 weeks ago  The problem occurs constantly  The problem has been gradually worsening since onset  The pain is present in the lumbar spine  The pain is severe  The pain is the same all the time  The symptoms are aggravated by bending, standing, twisting and stress  Stiffness is present in the morning  Associated symptoms include leg pain  Pertinent negatives include no abdominal pain, bladder incontinence, bowel incontinence, chest pain, dysuria, fever, headaches, numbness, paresis, paresthesias, pelvic pain, perianal numbness, tingling, weakness or weight loss  Risk factors include history of cancer  He has tried nothing for the symptoms  The treatment provided no relief  The following portions of the patient's history were reviewed and updated as appropriate:   He has a past medical history of Diabetes mellitus ([address] ), Hypertension, and Kidney stones  ,  does not have any pertinent problems on file  ,   has a past surgical history that includes Neck surgery and Cholecystectomy  ,  family history includes Breast cancer in his mother; Diabetes in his father, mother, and sister; Fibromyalgia in his sister; Hypertension in his mother  ,   reports that he has never smoked  He has never used smokeless tobacco  He reports that he does not drink alcohol or use drugs  ,  is allergic to codeine and penicillins     Current Outpatient Medications   Medication Sig Dispense Refill    cyclobenzaprine (FLEXERIL) 10 mg tablet Take 1 tablet (10 mg total) by mouth 3 (three) times a day as needed for muscle spasms for up to 14 days 42 tablet 0    diclofenac (VOLTAREN) 75 mg EC tablet Take 1 tablet (75 mg total) by mouth 2 (two) times a day (Patient not taking: Reported on 2/19/2020) 60 tablet 0    predniSONE 20 mg tablet 3 Tabs PO QDay x 3 Days, Then 2 Tabs PO QDay x 3 Days, Then 1 Tab PO QDay x 3 Days, Then 1/2 Tab PO QDay x 4 Days, Then Stop 20 tablet 0     No current facility-administered medications for this visit  Review of Systems   Constitutional: Negative for chills, fatigue, fever and weight loss  HENT: Negative  Respiratory: Negative for cough, chest tightness and shortness of breath  Cardiovascular: Negative for chest pain and palpitations  Gastrointestinal: Negative for abdominal pain, bowel incontinence, constipation, diarrhea, nausea and vomiting  Genitourinary: Negative  Negative for bladder incontinence, dysuria and pelvic pain  Musculoskeletal: Positive for back pain and myalgias  Skin: Negative  Neurological: Negative  Negative for tingling, weakness, numbness, headaches and paresthesias  Psychiatric/Behavioral: Negative for dysphoric mood  The patient is not nervous/anxious            Objective:  Vitals:    02/19/20 1042   BP: 152/88   BP Location: Left arm   Patient Position: Sitting   Cuff Size: Large   Pulse: 87   Resp: 16   Temp: 99 3 °F (37 4 °C)   SpO2: 96%   Weight: 122 kg (268 lb 12 8 oz)   Height: 5' 11" (1 803 m)     Body mass index is 37 49 kg/m²  Physical Exam   Constitutional: He is oriented to person, place, and time  He appears well-developed and well-nourished  HENT:   Head: Normocephalic and atraumatic  Eyes: Pupils are equal, round, and reactive to light  EOM are normal    Neck: Normal range of motion  Neck supple  Cardiovascular: Normal rate, regular rhythm, normal heart sounds and intact distal pulses  No murmur heard  Pulmonary/Chest: Effort normal and breath sounds normal  No stridor  He has no rales  Abdominal: Soft  Bowel sounds are normal  He exhibits no distension  There is no tenderness  Musculoskeletal: Normal range of motion  He exhibits tenderness  He exhibits no edema or deformity  Neurological: He is alert and oriented to person, place, and time  Skin: Skin is warm and dry  Psychiatric: He has a normal mood and affect  Nursing note and vitals reviewed         PHQ-9 Depression Screening    PHQ-9:    Frequency of the following problems over the past two weeks:

## 2020-02-19 NOTE — LETTER
February 19, 2020     Patient: Julianne Bolton   YOB: 1970   Date of Visit: 2/19/2020       To Whom it May Concern:    Bandar Mariee is under my professional care  He was seen in my office on 2/19/2020  He may return to work on 3/4/20  If you have any questions or concerns, please don't hesitate to call           Sincerely,          Wesley Jaramillo DO        CC: Sheyla Vick,

## 2020-02-25 ENCOUNTER — EVALUATION (OUTPATIENT)
Dept: PHYSICAL THERAPY | Facility: CLINIC | Age: 50
End: 2020-02-25
Payer: COMMERCIAL

## 2020-02-25 DIAGNOSIS — M54.42 ACUTE LEFT-SIDED LOW BACK PAIN WITH LEFT-SIDED SCIATICA: Primary | ICD-10-CM

## 2020-02-25 PROCEDURE — 97161 PT EVAL LOW COMPLEX 20 MIN: CPT | Performed by: PHYSICAL MEDICINE & REHABILITATION

## 2020-02-25 PROCEDURE — 97535 SELF CARE MNGMENT TRAINING: CPT | Performed by: PHYSICAL MEDICINE & REHABILITATION

## 2020-02-25 PROCEDURE — 97110 THERAPEUTIC EXERCISES: CPT | Performed by: PHYSICAL MEDICINE & REHABILITATION

## 2020-02-25 NOTE — PROGRESS NOTES
PT Evaluation     Today's date: 2020  Patient name: Antonia Bee  : 1970  MRN: [id number]  Referring provider: Janice Caputo DO  Dx:   Encounter Diagnosis     ICD-10-CM    1  Acute left-sided low back pain with left-sided sciatica M54 42 Ambulatory referral to Physical Therapy                  Assessment  Assessment details: Antonia Bee is a 54 y o  male presenting to outpatient physical therapy with diagnosis of Acute left-sided low back pain with left-sided sciatica  (primary encounter diagnosis)  Current signs/sx and PT exam findings appear consistent with LBP with underlying L hip OA with c/c of L thigh/groin pain with walking/WB and laying on L side in bed  He demonstrates + reproduction of 1* sx with L hip scour, FADIR, and resisted SLR flexion supine; pain is worse in WB on LLE and relieved at rest/sitting; he demonstrates + morning stiffness L hip and demonstrates painful and limited L hip IR; sx are resolved with long axis distraction of L hip  Pt demonstrates altered gait, L hip weakness, and reduced LE flexibility  Patient's present functional limitations include difficulty with ADLs with increased need for assistance, reliance on medication and/or modalities for pain relief, poor tolerance for functional mobility and activity, and difficulty completing work and home responsibilities  Patient to benefit from skilled outpatient physical therapy 1-2x/week for 4-6 weeks in order to reduce pain, maximize pain free range of motion, increase strength and stability, and improve functional mobility/functional activity in order to maximize return to prior level of function with reduced limitations  He has a high co-pay for PT services; increased time spent today reviewing HEP as noted; understanding demonstrated   Thank you for your referral     Impairments: abnormal gait, abnormal muscle firing, abnormal or restricted ROM, activity intolerance, impaired physical strength, lacks appropriate home exercise program, pain with function and weight-bearing intolerance    Symptom irritability: moderateUnderstanding of Dx/Px/POC: good   Prognosis: good    Goals  STGs to be achieved in 4-6 weeks:    1  Pt will report reduced L hip/thigh pain levels "at worst" by at least 2 points (0-10 scale) in order to allow improved tolerance for functional mobility and reduced reliance on medication or modalities for pain relief  2  Pt will demonstrate improved AROM of L hip IR by at least 5-10* with reduced pain in order to reduce pt difficulty with gait and transfers and restore normal joint mobility  3  Pt will demonstrate improved strength of L hip ER and glute med by at least 1/2-1 MMT in order to improve weight bearing joint stability and allow for restoration of normal joint mechanics to reduce pain and improve function  4  Pt will demonstrate normalized standing posture without lateral deviation  LTGs to be achieved in 8-12 weeks:    1  Pt will be independent with HEP, demonstrating proper technique with exercises and understanding of self progression of program without need for cueing or assistance  2  Pt will report minimized pain levels with at least 75% reduction in pain since [facility]  3  Pt will demonstrate WFL AROM and strength of L hip without pain  4  Pt will demonstrate normalized gait without deviations or need for assistive device or external support  5  Pt will report return to work without limitations  6  FOTO will reflect score at discharge that is greater than or equal to predicted level         Plan  Patient would benefit from: skilled physical therapy  Referral necessary: No  Planned modality interventions: cryotherapy  Planned therapy interventions: manual therapy, joint mobilization, abdominal trunk stabilization, balance, neuromuscular re-education, motor coordination training, patient education, postural training, self care, strengthening, stretching, therapeutic exercise and home exercise program  Frequency: 2x week  Duration in visits: 12  Duration in weeks: 6  Plan of Care beginning date: 2020  Plan of Care expiration date: 2020  Treatment plan discussed with: patient        Subjective Evaluation    History of Present Illness  Mechanism of injury: Pt notes onset of back pain late Dec 2019  Notes he fell down his steps at home in 2019; notes he sprained and "fx his L ankle really bad"; was in a WC for "3 months and in a boot"  Notes around late Dec, he developed LBP  No known JENNYFER per pt, however notes 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  Pain started as L sided LBP  Gradually worsened with spreading of pain into L lateral hip to anterior L thigh to the knee  Notes at times has has had n/t in L lateral leg proximally only  No sx into ankle/foot  No other n/t per pt  Notes pain can shoot into L groin often  Notes his LLE feels "heavy"; denies any LE buckling/giving way  No night pain, unexplained weight loss, no consitutional sx, no change in bowel/bladder function, and no saddle anesthesia  Note his L hip/thigh can be "stiff" in the morning; reduced with activity/movement  F/u with his 1* care MD; given medication which pt notes did not help  Notes Xrays were + for "OA and spasms" per pt L/S; also notes he was told he has hip OA  RTD and reported cont'd back pain; referred to PT at this time  RTD 3/4/2020  Also has a PM appt 3/9  Notes he has had "back problems for years" (20+ per pt)  Notes it rarely goes into the leg like it is now  Notes past benefit with injections with intermittent relief of sx  Notes sx would typically improve after a few weeks               Recurrent probem    Quality of life: fair    Pain  Current pain ratin  At best pain ratin  At worst pain ratin  Location: L/S  L hip/thigh/knee, L groin pain   Quality: throbbing and dull ache ("swollen", "pressure")  Aggravating factors: standing, walking, stair climbing and lifting  Progression: no change    Social Support  Steps to enter house: yes  3  Stairs in house: yes   Lives in: multiple-level home  Lives with: young children (Mom)    Employment status: not working (Presently [place]; employed in a [employer])  Hand dominance: left      Diagnostic Tests  X-ray: abnormal  Treatments  Previous treatment: medication  Current treatment: medication and physical therapy  Patient Goals  Patient goals for therapy: decreased pain, increased motion, increased strength, independence with ADLs/IADLs and return to work  Patient's goals regarding treatment: "strengthening back and core mms"         Objective     Postural Observations  Seated posture: poor  Standing posture: fair    Additional Postural Observation Details  Seated: Forward head/rounded shoulders  Increased thoracic kyphosis   B scapular depression and protraction with mild winging   Increased PPT    Standing:   Slight R lateral shift noted in standing vs WS to R to unload LLE; Will cont to assess    Neurological Testing     Sensation     Lumbar   Left   Intact: light touch    Right   Intact: light touch    Reflexes   Left   Patellar (L4): absent (0)  Achilles (S1): normal (2+)  Clonus sign: negative    Right   Patellar (L4): normal (2+)  Achilles (S1): normal (2+)  Clonus sign: negative    Active Range of Motion     Lumbar   Flexion:  Restriction level: minimal  Extension:  Restriction level: moderate  Left lateral flexion:  Restriction level: minimal  Right lateral flexion: Active right lumbar lateral flexion: Pain L L/S   with pain Restriction level: minimal  Left rotation: Active left lumbar rotation: Pain L anterior/medial thigh to knee  with pain Restriction level: moderate  Right rotation:  Restriction level: minimal  Left Hip   Flexion: WFL  Extension: 7 degrees   Abduction: WFL  Adduction: WFL  External rotation (90/90):  Left hip active external rotation 90/90: 75%   Internal rotation (90/90): Left hip active internal rotation 90/90: 25% groin/thigh pain  with pain    Additional Active Range of Motion Details  No change in sx after AROM assessment in standing  Will cont to assess     Mechanical Assessment    Cervical      Thoracic      Lumbar    Standing flexion: repeated movements   Pain location:peripheralized  Pain intensity: worse  Increased L/S pain to 4-5/10; produced L knee pain   Standing extension: repeated movements  Pain location: no change  4-5/10 L/S pain and knee pain remained; "tightness" R thigh; increased ROM   Right sidegliding: repeated movements  Pain location: centralized  Reduced LBP to 3/10, resolved L knee pain, cont'd "tightness" R thigh; improved symmetricla posture and increased ROM    Strength/Myotome Testing     Lumbar   Left   Heel walk: abnormal  Toe walk: normal    Right   Heel walk: normal  Toe walk: normal    Left Hip   Planes of Motion   Flexion: 4  Left hip extension strength: TBA  Abduction: 4+  Adduction: 4+  External rotation: 4+    Isolated Muscles   Gluteus medius: 4-    Right Hip   Planes of Motion   Flexion: 4+  Abduction: 4+  Adduction: 4+  External rotation: 5    Left Knee   Flexion: 4+  Extension: 4    Right Knee   Flexion: 4+  Extension: 5    Left Ankle/Foot   Dorsiflexion: 4  Plantar flexion: 4+  Great toe extension: 4-    Right Ankle/Foot   Dorsiflexion: 4+  Plantar flexion: 5  Great toe extension: 4    Additional Strength Details  Slight weakness noted L ankle vs R with heel walk/toe walk; hx of L ankle injury 11/2019; will cont to assess    Min L/S and L hip pain with L hip flexion seated; moderate L thigh/groin pain with resisted SLR flexion supine      Tests     Lumbar     Left   Negative crossed SLR, passive SLR and slump test      Right   Negative crossed SLR, passive SLR and slump test      Left Hip   Positive Ely's, FADIR and scour  Negative ANTONIA       Additional Tests Details  + reproduction of pt's thigh/groin pain with L FADIR, resisted SLR flexion supine, and scour   + reduced L hip/thigh/L L/S pain with long axis distraction  L hip     Ambulation     Ambulation: Level Surfaces     Additional Level Surfaces Ambulation Details  Reduced L stance time  Increased lateral flexion with L stance                Precautions: DM      Re-eval Date: 4/7    Date 2/25       Visit Count 1       FOTO 2/25       Pain In 3/10       Pain Out 1/10             Manual  2/25       LLE long axis distraction, L Hip PROM (focus on IR, extension as liliana)                                            Exercise Diary  2/25       [facility]        HS stretch 2x30"       Hip flexor/quad stretch 2x30"       SLR abd 10x       Clamshell  Blue  10x       90/90 glute med raise 10x       Bridge 10x cues        SLR extension, adduction        Side stepping with tband         Partial squats        Knee extension     HS curl        Leg press with tband        Step ups forward and lateral         Romberg foam EC        Tandem foam         Dead lifts                                            Modalities  2/25        prn                             2/25 - HEP was issued and reviewed this date for above noted exercises  Pt demonstrated understanding without incident and without questions/concerns  Will continue to update upcoming

## 2020-02-28 ENCOUNTER — OFFICE VISIT (OUTPATIENT)
Dept: PHYSICAL THERAPY | Facility: CLINIC | Age: 50
End: 2020-02-28
Payer: COMMERCIAL

## 2020-02-28 DIAGNOSIS — M54.42 ACUTE LEFT-SIDED LOW BACK PAIN WITH LEFT-SIDED SCIATICA: Primary | ICD-10-CM

## 2020-02-28 PROCEDURE — 97140 MANUAL THERAPY 1/> REGIONS: CPT | Performed by: PHYSICAL MEDICINE & REHABILITATION

## 2020-02-28 PROCEDURE — 97112 NEUROMUSCULAR REEDUCATION: CPT | Performed by: PHYSICAL MEDICINE & REHABILITATION

## 2020-02-28 PROCEDURE — 97110 THERAPEUTIC EXERCISES: CPT | Performed by: PHYSICAL MEDICINE & REHABILITATION

## 2020-02-28 NOTE — PROGRESS NOTES
Daily Note     Today's date: 2020  Patient name: Nelly Pierson  : 1970  MRN: [id number]  Referring provider: Erum Hunt DO  Dx:   Encounter Diagnosis     ICD-10-CM    1  Acute left-sided low back pain with left-sided sciatica M54 42                   Subjective: Pt notes no new sx/complaints  Notes he "felt looser" with the exercises  Notes however because of the "cold weather" he is having more back and LLE pain the past 1-2 days; pain in L SIJ region into L anterior thigh, L knee, and into proximal L anterior lower leg  He notes no new sx  Sx are reduced with laying/sitting down  Objective: See treatment diary below      Assessment: Tolerated treatment well  Increase in back and LLE sx with WB/standing; resolved with sitting/laying  Attempted standing L/S extensions with poor tolerance with back pain  Tolerated supine Apple well without [employer]  Notes poor DLS with reduced motor control of the TA in supine; improved with hands on and verbal cues with fair-good return demo  Noted + supine to sit test for L Posterior SIJ rotation; responded well to MET with negative f/u test noted following tx  Noted reduced sx following MET, however sx gradually returned upon standing/walking end of tx  No new sx/complaints  Reviewed HEP with no questions/concerns  Patient demonstrated fatigue post treatment and would benefit from continued PT      Plan: Continue per plan of care  Progress treatment as tolerated  Precautions: DM      Re-eval Date:     Date       Visit Count 1 2      FOTO        Pain In 3/10 9/10      Pain Out 1/10 810            Manual        LLE long axis distraction, L Hip PROM (focus on IR, extension as liliana)  [employer] for left posterior SI rotation patient in hook lying to patient tolerance    10'                                             Exercise Diary        [address] [facility]  Upright bike   L3 10'      HS stretch 2x30" 4x30"    Piriformis stretch 4x30"ea       Hip flexor/quad stretch 2x30" 4x20-30"    SKTC 5x10-15"ea    LTR 5x 10-15"ea         SLR abd 10x HEP      Clamshell  Blue  10x HEP      90/90 glute med raise 10x HEP      Bridge 10x cues  HEP       SLR extension, adduction        Side stepping with tband         Partial squats        Knee extension     HS curl        Leg press with tband        Step ups forward and lateral         Romberg foam EC        Tandem foam         Dead lifts          AH supine   2x10/5"cues        AH with hip abd/add iso   Ball/blute TB  10x/10"ea cues          AH with alt UEs, alt LEs, and alt UE/LEs  10x ea alt with TA   Cues                   Modalities  2/25        prn deferred

## 2020-03-13 ENCOUNTER — HOSPITAL ENCOUNTER (EMERGENCY)
Facility: HOSPITAL | Age: 50
Discharge: HOME | End: 2020-03-13
Attending: EMERGENCY MEDICINE | Admitting: EMERGENCY MEDICINE
Payer: COMMERCIAL

## 2020-03-13 ENCOUNTER — APPOINTMENT (EMERGENCY)
Dept: CT IMAGING | Facility: HOSPITAL | Age: 50
End: 2020-03-13
Payer: COMMERCIAL

## 2020-03-13 ENCOUNTER — APPOINTMENT (EMERGENCY)
Dept: RADIOLOGY | Facility: HOSPITAL | Age: 50
End: 2020-03-13
Payer: COMMERCIAL

## 2020-03-13 VITALS
WEIGHT: 265.65 LBS | HEART RATE: 100 BPM | TEMPERATURE: 97.4 F | BODY MASS INDEX: 37.19 KG/M2 | RESPIRATION RATE: 16 BRPM | DIASTOLIC BLOOD PRESSURE: 88 MMHG | OXYGEN SATURATION: 95 % | HEIGHT: 71 IN | SYSTOLIC BLOOD PRESSURE: 160 MMHG

## 2020-03-13 DIAGNOSIS — K76.9 CHRONIC LIVER DISEASE: ICD-10-CM

## 2020-03-13 DIAGNOSIS — D69.6 THROMBOCYTOPENIA (HCC): ICD-10-CM

## 2020-03-13 DIAGNOSIS — M54.42 LEFT-SIDED LOW BACK PAIN WITH LEFT-SIDED SCIATICA: Primary | ICD-10-CM

## 2020-03-13 DIAGNOSIS — I10 HYPERTENSION: ICD-10-CM

## 2020-03-13 LAB
ALBUMIN SERPL BCP-MCNC: 3.7 G/DL (ref 3.5–5)
ALP SERPL-CCNC: 122 U/L (ref 46–116)
ALT SERPL W P-5'-P-CCNC: 90 U/L (ref 12–78)
ANION GAP SERPL CALCULATED.3IONS-SCNC: 7 MMOL/L (ref 4–13)
AST SERPL W P-5'-P-CCNC: 62 U/L (ref 5–45)
BACTERIA UR QL AUTO: ABNORMAL /HPF
BASOPHILS # BLD AUTO: 0.04 THOUSANDS/ΜL (ref 0–0.1)
BASOPHILS NFR BLD AUTO: 1 % (ref 0–1)
BILIRUB DIRECT SERPL-MCNC: 0.28 MG/DL (ref 0–0.2)
BILIRUB SERPL-MCNC: 1.1 MG/DL (ref 0.2–1)
BILIRUB UR QL STRIP: NEGATIVE
BUN SERPL-MCNC: 20 MG/DL (ref 5–25)
CALCIUM SERPL-MCNC: 9.5 MG/DL (ref 8.3–10.1)
CHLORIDE SERPL-SCNC: 102 MMOL/L (ref 100–108)
CLARITY UR: CLEAR
CO2 SERPL-SCNC: 30 MMOL/L (ref 21–32)
COLOR UR: YELLOW
CREAT SERPL-MCNC: 1.12 MG/DL (ref 0.6–1.3)
EOSINOPHIL # BLD AUTO: 0.12 THOUSAND/ΜL (ref 0–0.61)
EOSINOPHIL NFR BLD AUTO: 2 % (ref 0–6)
ERYTHROCYTE [DISTWIDTH] IN BLOOD BY AUTOMATED COUNT: 14.6 % (ref 11.6–15.1)
GFR SERPL CREATININE-BSD FRML MDRD: 78 ML/MIN/1.73SQ M
GLUCOSE SERPL-MCNC: 191 MG/DL (ref 65–140)
GLUCOSE UR STRIP-MCNC: ABNORMAL MG/DL
HCT VFR BLD AUTO: 53.8 % (ref 36.5–49.3)
HGB BLD-MCNC: 16.9 G/DL (ref 12–17)
HGB UR QL STRIP.AUTO: ABNORMAL
IMM GRANULOCYTES # BLD AUTO: 0.01 THOUSAND/UL (ref 0–0.2)
IMM GRANULOCYTES NFR BLD AUTO: 0 % (ref 0–2)
KETONES UR STRIP-MCNC: ABNORMAL MG/DL
LEUKOCYTE ESTERASE UR QL STRIP: NEGATIVE
LYMPHOCYTES # BLD AUTO: 1.43 THOUSANDS/ΜL (ref 0.6–4.47)
LYMPHOCYTES NFR BLD AUTO: 24 % (ref 14–44)
MCH RBC QN AUTO: 27.1 PG (ref 26.8–34.3)
MCHC RBC AUTO-ENTMCNC: 31.4 G/DL (ref 31.4–37.4)
MCV RBC AUTO: 86 FL (ref 82–98)
MONOCYTES # BLD AUTO: 0.64 THOUSAND/ΜL (ref 0.17–1.22)
MONOCYTES NFR BLD AUTO: 11 % (ref 4–12)
NEUTROPHILS # BLD AUTO: 3.77 THOUSANDS/ΜL (ref 1.85–7.62)
NEUTS SEG NFR BLD AUTO: 62 % (ref 43–75)
NITRITE UR QL STRIP: NEGATIVE
NON-SQ EPI CELLS URNS QL MICRO: ABNORMAL /HPF
NRBC BLD AUTO-RTO: 0 /100 WBCS
PH UR STRIP.AUTO: 5.5 [PH]
PLATELET # BLD AUTO: 66 THOUSANDS/UL (ref 149–390)
PMV BLD AUTO: 11.3 FL (ref 8.9–12.7)
POTASSIUM SERPL-SCNC: 4.4 MMOL/L (ref 3.5–5.3)
PROT SERPL-MCNC: 7.5 G/DL (ref 6.4–8.2)
PROT UR STRIP-MCNC: ABNORMAL MG/DL
RBC # BLD AUTO: 6.23 MILLION/UL (ref 3.88–5.62)
RBC #/AREA URNS AUTO: ABNORMAL /HPF
SODIUM SERPL-SCNC: 139 MMOL/L (ref 136–145)
SP GR UR STRIP.AUTO: 1.02 (ref 1–1.03)
UROBILINOGEN UR QL STRIP.AUTO: 1 E.U./DL
WBC # BLD AUTO: 6.01 THOUSAND/UL (ref 4.31–10.16)
WBC #/AREA URNS AUTO: ABNORMAL /HPF

## 2020-03-13 PROCEDURE — 96361 HYDRATE IV INFUSION ADD-ON: CPT

## 2020-03-13 PROCEDURE — 81001 URINALYSIS AUTO W/SCOPE: CPT | Performed by: PHYSICIAN ASSISTANT

## 2020-03-13 PROCEDURE — 96374 THER/PROPH/DIAG INJ IV PUSH: CPT

## 2020-03-13 PROCEDURE — 80076 HEPATIC FUNCTION PANEL: CPT | Performed by: PHYSICIAN ASSISTANT

## 2020-03-13 PROCEDURE — 73564 X-RAY EXAM KNEE 4 OR MORE: CPT

## 2020-03-13 PROCEDURE — 74177 CT ABD & PELVIS W/CONTRAST: CPT

## 2020-03-13 PROCEDURE — 99284 EMERGENCY DEPT VISIT MOD MDM: CPT | Performed by: PHYSICIAN ASSISTANT

## 2020-03-13 PROCEDURE — 99284 EMERGENCY DEPT VISIT MOD MDM: CPT

## 2020-03-13 PROCEDURE — 80048 BASIC METABOLIC PNL TOTAL CA: CPT | Performed by: PHYSICIAN ASSISTANT

## 2020-03-13 PROCEDURE — 36415 COLL VENOUS BLD VENIPUNCTURE: CPT | Performed by: PHYSICIAN ASSISTANT

## 2020-03-13 PROCEDURE — 85025 COMPLETE CBC W/AUTO DIFF WBC: CPT | Performed by: PHYSICIAN ASSISTANT

## 2020-03-13 RX ORDER — FENTANYL CITRATE 50 UG/ML
50 INJECTION, SOLUTION INTRAMUSCULAR; INTRAVENOUS ONCE
Status: DISCONTINUED | OUTPATIENT
Start: 2020-03-13 | End: 2020-03-13

## 2020-03-13 RX ORDER — LIDOCAINE 50 MG/G
1 PATCH TOPICAL ONCE
Status: DISCONTINUED | OUTPATIENT
Start: 2020-03-13 | End: 2020-03-13 | Stop reason: HOSPADM

## 2020-03-13 RX ORDER — DIAZEPAM 5 MG/1
5 TABLET ORAL ONCE
Status: COMPLETED | OUTPATIENT
Start: 2020-03-13 | End: 2020-03-13

## 2020-03-13 RX ORDER — KETOROLAC TROMETHAMINE 30 MG/ML
15 INJECTION, SOLUTION INTRAMUSCULAR; INTRAVENOUS ONCE
Status: COMPLETED | OUTPATIENT
Start: 2020-03-13 | End: 2020-03-13

## 2020-03-13 RX ORDER — OXYCODONE HYDROCHLORIDE 5 MG/1
5 TABLET ORAL EVERY 4 HOURS PRN
Qty: 12 TABLET | Refills: 0 | Status: SHIPPED | OUTPATIENT
Start: 2020-03-13 | End: 2020-07-20

## 2020-03-13 RX ORDER — OXYCODONE HYDROCHLORIDE 5 MG/1
5 TABLET ORAL ONCE
Status: COMPLETED | OUTPATIENT
Start: 2020-03-13 | End: 2020-03-13

## 2020-03-13 RX ADMIN — SODIUM CHLORIDE 1000 ML: 0.9 INJECTION, SOLUTION INTRAVENOUS at 10:36

## 2020-03-13 RX ADMIN — IOHEXOL 100 ML: 350 INJECTION, SOLUTION INTRAVENOUS at 11:09

## 2020-03-13 RX ADMIN — LIDOCAINE 1 PATCH: 50 PATCH TOPICAL at 10:34

## 2020-03-13 RX ADMIN — DIAZEPAM 5 MG: 5 TABLET ORAL at 10:33

## 2020-03-13 RX ADMIN — OXYCODONE HYDROCHLORIDE 5 MG: 5 TABLET ORAL at 12:41

## 2020-03-13 RX ADMIN — KETOROLAC TROMETHAMINE 15 MG: 30 INJECTION, SOLUTION INTRAMUSCULAR at 10:37

## 2020-03-13 NOTE — DISCHARGE INSTRUCTIONS
Continue to alternate ice/heat, topical muscle rubs, etc   Caution sedation with the pain medication  Schedule a follow up with pain/spine specialist for further evaluation and management  You will also need to schedule an appointment with GI specialist for follow up in regards to liver findings as there is concern regarding cirrhosis  Avoid tylenol containing products  Avoid alcohol  Would also recommend follow up with PCP and keep a close eye on your blood pressure  Return to ER as needed

## 2020-03-13 NOTE — ED PROVIDER NOTES
History  Chief Complaint   Patient presents with    Pain     low back pain, left knee and left hip pain for 1 month, unable to bear the pain anymore  taking methocarbanol, flexeril, ibuprofen and family tramadaol without relief  54year old male presents from home for evaluation of continued low back, left hip and left knee pain  He notes symptoms have been ongoing since a fall in November when he fell down some steps at home  He notes he initially had issues with left knee  Was placed in knee immobilizer and referred to orthopedics  He notes he improved, up until about December 2019 when he developed low back pain  Pain mainly located left side of low back, radiates into hip and down left leg to the knee  He's had issues for over the past 6 weeks but he feels things are getting worse as opposed to better  Denies new or repeat injury  Denies abdominal pain  Reports some nausea, which he contributes to the pain  Denies vomiting, diarrhea  Reports some constipation as well  Denies any urinary complaints  Denies fever, chills  Denies loss of bladder or bowel control  Denies altered sensation when wiping with toilet paper  Pain aggravated by movement  Mom at bedside also states pt hasn't been walking much over the past 2 weeks and mom states he's just been laying around in bed  No reported alleviating factors  Feels more comfortable laying on right side with left knee bent  Knee pain worse with extension  Denies numbness, tingling, weakness  Started PT last week which pt notes made symptoms worse  Has been taking ibuprofen , flexeril, robaxin, tylenol - all without relief  Denies h/o spinal injections  Denies IV drug use  Denies personal history of cancer  History provided by:  Patient   used: No        Prior to Admission Medications   Prescriptions Last Dose Informant Patient Reported? Taking?    cyclobenzaprine (FLEXERIL) 10 mg tablet   No No   Sig: Take 1 tablet (10 mg total) by mouth 3 (three) times a day as needed for muscle spasms for up to 14 days   diclofenac (VOLTAREN) 75 mg EC tablet   No No   Sig: Take 1 tablet (75 mg total) by mouth 2 (two) times a day   Patient not taking: Reported on 2/19/2020   predniSONE 20 mg tablet   No No   Sig: 3 Tabs PO QDay x 3 Days, Then 2 Tabs PO QDay x 3 Days, Then 1 Tab PO QDay x 3 Days, Then 1/2 Tab PO QDay x 4 Days, Then Stop      Facility-Administered Medications: None       Past Medical History:   Diagnosis Date    Diabetes mellitus ([facility] [address] )     Hypertension     Kidney stones        Past Surgical History:   Procedure Laterality Date    CHOLECYSTECTOMY      NECK SURGERY         Family History   Problem Relation Age of Onset    Hypertension Mother     Diabetes Mother     Breast cancer Mother     Diabetes Father     Diabetes Sister     Fibromyalgia Sister      I have reviewed and agree with the history as documented  E-Cigarette/Vaping    E-Cigarette Use Never User      E-Cigarette/Vaping Substances     Social History     Tobacco Use    Smoking status: Never Smoker    Smokeless tobacco: Never Used   Substance Use Topics    Alcohol use: Never     Frequency: Never    Drug use: Never       Review of Systems   Constitutional: Negative  Negative for chills, fatigue and fever  HENT: Negative  Negative for congestion, rhinorrhea and sore throat  Eyes: Negative  Negative for visual disturbance  Respiratory: Negative  Negative for cough, shortness of breath and wheezing  Cardiovascular: Negative  Negative for chest pain, palpitations and leg swelling  Gastrointestinal: Positive for constipation and nausea  Negative for abdominal pain, diarrhea and vomiting  Genitourinary: Negative  Negative for decreased urine volume, dysuria, flank pain, frequency and hematuria  Musculoskeletal: Positive for arthralgias, back pain and gait problem  Negative for myalgias and neck pain  Skin: Negative    Negative for rash    Neurological: Negative for dizziness, syncope, weakness, light-headedness, numbness and headaches  Psychiatric/Behavioral: Negative  Negative for confusion  All other systems reviewed and are negative  Physical Exam  Physical Exam   Constitutional: He is oriented to person, place, and time  He appears well-developed and well-nourished  Non-toxic appearance  He appears distressed (appears uncomfortable, laying on right side)  HENT:   Head: Normocephalic and atraumatic  Right Ear: Hearing, tympanic membrane, external ear and ear canal normal    Left Ear: Hearing, tympanic membrane, external ear and ear canal normal    Nose: Nose normal    Mouth/Throat: Uvula is midline, oropharynx is clear and moist and mucous membranes are normal  No oropharyngeal exudate  Eyes: Pupils are equal, round, and reactive to light  Conjunctivae, EOM and lids are normal  No scleral icterus  Neck: Trachea normal and normal range of motion  Neck supple  No JVD present  No tracheal deviation present  Cardiovascular: Regular rhythm, normal heart sounds, intact distal pulses and normal pulses  Tachycardia present  No murmur heard  Tachycardic to 110s on the monitor   Pulmonary/Chest: Effort normal and breath sounds normal  No respiratory distress  He has no wheezes  He has no rhonchi  He has no rales  Abdominal: Soft  Normal appearance and bowel sounds are normal  He exhibits no distension  There is no hepatosplenomegaly  There is no tenderness  There is no rebound, no guarding and no CVA tenderness  No hernia  obese   Genitourinary:   Genitourinary Comments: Refused rectal exam   Musculoskeletal: He exhibits no edema  Left hip: He exhibits no bony tenderness and no deformity  Left knee: He exhibits decreased range of motion  He exhibits no swelling, no effusion, no ecchymosis, no deformity, no erythema, normal alignment and normal patellar mobility  Tenderness found   Medial joint line tenderness noted         Lumbar back: He exhibits decreased range of motion, tenderness and pain  He exhibits no swelling, no deformity, no laceration and normal pulse  Neurological: He is alert and oriented to person, place, and time  He has normal strength and normal reflexes  No cranial nerve deficit or sensory deficit  He exhibits normal muscle tone  GCS eye subscore is 4  GCS verbal subscore is 5  GCS motor subscore is 6  No saddle anesthesia  (+) SLR on the left  Skin: Skin is warm and dry  No rash noted  He is not diaphoretic  No cyanosis  Nails show no clubbing  Psychiatric: He has a normal mood and affect  His behavior is normal    Nursing note and vitals reviewed        Vital Signs  ED Triage Vitals   Temperature Pulse Respirations Blood Pressure SpO2   03/13/20 0957 03/13/20 0955 03/13/20 0955 03/13/20 0955 03/13/20 0955   (!) 97 4 °F (36 3 °C) (!) 115 16 (!) 185/103 95 %      Temp Source Heart Rate Source Patient Position - Orthostatic VS BP Location FiO2 (%)   03/13/20 0957 03/13/20 0955 03/13/20 0955 03/13/20 0955 --   Temporal Monitor Lying Left arm       Pain Score       03/13/20 0957       Worst Possible Pain           Vitals:    03/13/20 0955 03/13/20 1115 03/13/20 1330 03/13/20 1615   BP: (!) 185/103 (!) 181/88 162/88 160/88   Pulse: (!) 115 (!) 110 (!) 116 100   Patient Position - Orthostatic VS: Lying Lying Lying Lying         Visual Acuity      ED Medications  Medications   sodium chloride 0 9 % bolus 1,000 mL (0 mL Intravenous Stopped 3/13/20 1136)   ketorolac (TORADOL) injection 15 mg (15 mg Intravenous Given 3/13/20 1037)   diazepam (VALIUM) tablet 5 mg (5 mg Oral Given 3/13/20 1033)   iohexol (OMNIPAQUE) 350 MG/ML injection (SINGLE-DOSE) 100 mL (100 mL Intravenous Given 3/13/20 1109)   oxyCODONE (ROXICODONE) IR tablet 5 mg (5 mg Oral Given 3/13/20 1241)       Diagnostic Studies  Results Reviewed     Procedure Component Value Units Date/Time    Urine Microscopic [[id number]]  (Abnormal) Collected:  03/13/20 1516    Lab Status:  Final result Specimen:  Urine, Other Updated:  03/13/20 1554     RBC, UA 10-20 /hpf      WBC, UA 0-1 /hpf      Epithelial Cells Occasional /hpf      Bacteria, UA Occasional /hpf     UA (URINE) with reflex to Scope [[id number]]  (Abnormal) Collected:  03/13/20 1516    Lab Status:  Final result Specimen:  Urine, Other Updated:  03/13/20 1530     Color, UA Yellow     Clarity, UA Clear     Specific [place], UA 1 020     pH, UA 5 5     Leukocytes, UA Negative     Nitrite, UA Negative     Protein,  (2+) mg/dl      Glucose,  (1/4%) mg/dl      Ketones, UA Trace mg/dl      Urobilinogen, UA 1 0 E U /dl      Bilirubin, UA Negative     Blood, UA Large    Hepatic function panel [[id number]]  (Abnormal) Collected:  03/13/20 1027    Lab Status:  Final result Specimen:  Blood from Arm, Left Updated:  03/13/20 1321     Total Bilirubin 1 10 mg/dL      Bilirubin, Direct 0 28 mg/dL      Alkaline Phosphatase 122 U/L      AST 62 U/L      ALT 90 U/L      Total Protein 7 5 g/dL      Albumin 3 7 g/dL     CBC and differential [[id number]]  (Abnormal) Collected:  03/13/20 1027    Lab Status:  Final result Specimen:  Blood from Arm, Left Updated:  03/13/20 1050     WBC 6 01 Thousand/uL      RBC 6 23 Million/uL      Hemoglobin 16 9 g/dL      Hematocrit 53 8 %      MCV 86 fL      MCH 27 1 pg      MCHC 31 4 g/dL      RDW 14 6 %      MPV 11 3 fL      Platelets 66 Thousands/uL      nRBC 0 /100 WBCs      Neutrophils Relative 62 %      Immat GRANS % 0 %      Lymphocytes Relative 24 %      Monocytes Relative 11 %      Eosinophils Relative 2 %      Basophils Relative 1 %      Neutrophils Absolute 3 77 Thousands/µL      Immature Grans Absolute 0 01 Thousand/uL      Lymphocytes Absolute 1 43 Thousands/µL      Monocytes Absolute 0 64 Thousand/µL      Eosinophils Absolute 0 12 Thousand/µL      Basophils Absolute 0 04 Thousands/µL     Basic metabolic panel [[id number]]  (Abnormal) Collected:  03/13/20 1027 Lab Status:  Final result Specimen:  Blood from Arm, Left Updated:  03/13/20 1042     Sodium 139 mmol/L      Potassium 4 4 mmol/L      Chloride 102 mmol/L      CO2 30 mmol/L      ANION GAP 7 mmol/L      BUN 20 mg/dL      Creatinine 1 12 mg/dL      Glucose 191 mg/dL      Calcium 9 5 mg/dL      eGFR 78 ml/min/1 73sq m     Narrative:       [place] guidelines for Chronic Kidney Disease (CKD):     Stage 1 with normal or high GFR (GFR > 90 mL/min/1 73 square meters)    Stage 2 Mild CKD (GFR = 60-89 mL/min/1 73 square meters)    Stage 3A Moderate CKD (GFR = 45-59 mL/min/1 73 square meters)    Stage 3B Moderate CKD (GFR = 30-44 mL/min/1 73 square meters)    Stage 4 Severe CKD (GFR = 15-29 mL/min/1 73 square meters)    Stage 5 End Stage CKD (GFR <15 mL/min/1 73 square meters)  Note: GFR calculation is accurate only with a steady state creatinine                 CT recon only lumbar spine   Final Result by Sheldon Benitez MD (03/13 113)      No fracture or traumatic subluxation  Moderate endplate and facet joint degenerative changes throughout the lumbar spine  Workstation performed: [id number]         CT abdomen pelvis with contrast   Final Result by Sheldon Benitez MD (03/13 0224)   1  No acute intra-abdominal inflammatory process  2  Cirrhotic appearance of the liver with splenomegaly, gastric and esophageal varices suggesting portal hypertension  3  Punctate nonobstructing bilateral renal calculi  Workstation performed: [id number]         XR knee 4+ vw left injury   ED Interpretation by Dennis Ignacio PA-C (03/13 1108)   No fracture  Final Result by Tino Gordon MD (03/13 111)      1  No acute osseous abnormality  2   Chronic findings including trace isolated patellofemoral degenerative changes and distal quadriceps enthesopathy  Note: I agree with the preliminary interpretation by the ED care provider documented in Epic  Workstation performed: [id number]                    Procedures  Procedures         ED Course  ED Course as of Mar 13 2229   Fri Mar 13, 2020   1023 Previous records reviewed  Was seen at [facility] ED on 2/8/20 for similar symptoms - treated with NSAID, muscle relaxant and conservative measures  Was seen in follow up on 2/12 and 2/19 by PCP  Had XR LS spine on 2/12 which showed lumbar lordosis and multilevel degenerative changes  Completed prednisone taper without relief  Was referred to PT and attended sessions on 2/25 and 2/28 and pt states this made symptoms worse  1044 Known diabetic  Glucose, Random(!): 191   1044 Creatinine: 1 12   1044 BUN: 20   1044 Sodium: 139   1044 Potassium: 4 4   1044 Chloride: 102   1044 CO2: 30   1044 Anion Gap: 7   1044 Calcium: 9 5   1044 eGFR: 78   1102 WBC: 6 01   1102 Hemoglobin: 16 9   1102 Stable from 67 six months ago   Platelet Count(!): 66   1103 Independently viewed and interpreted by me - no acute osseous findings; pending official read  XR knee 4+ vw left injury   1122 CT scans performed and pending interpretation  1136 IMPRESSION:  1  No acute intra-abdominal inflammatory process  2  Cirrhotic appearance of the liver with splenomegaly, gastric and esophageal varices suggesting portal hypertension  3  Punctate nonobstructing bilateral renal calculi       CT abdomen pelvis with contrast   1154 IMPRESSION:     No fracture or traumatic subluxation  Moderate endplate and facet joint degenerative changes throughout the lumbar spine  CT recon only lumbar spine   1210 Pt and mom updated on results  Pt denies h/o liver problems or being told he has cirrhosis  Denies h/o hepatitis  Denies EtOH  Will definitely need outpatient GI follow up  We discussed avoiding hepatoxic agents  46 Pt still having back pain  States medications given have been ineffective        1344 TOTAL BILIRUBIN(!): 1 10   1344 BILIRUBIN DIRECT(!): 0 28   1344 Alkaline Phosphatase(!): 122   1344 AST(!): 62   1345 ALT(!): 90   1345 Total Protein: 7 5   1345 Albumin: 3 7   1406 Findings reviewed with pt and mom  Mom expresses concern about him going home with continued pain  Pain improved but still present  Will attempt ambulation with walker  1430 Pt ambulating with use of walker (he reports having one at home that he uses)  He states, "pain feels alright"  1515 Pt again reassessed  He's feeling improved  He was able to ambulate with the walker and walked to the bathroom to provide urine specimen  1543 Color, UA: Yellow   1543 Clarity, UA: Clear   1543 SL AMB SPECIFIC GRAVITY_URINE: 1 020   1543 pH, UA: 5 5   1543 Leukocytes, UA: Negative   1543 Nitrite, UA: Negative   1543 POCT URINE PROTEIN(!): 100 (2+)   1543 Glucose, UA(!): 250 (1/4%)   1543 Ketones, UA(!): Trace   1543 SL AMB POCT UROBILINOGEN: 1 0   1543 Bilirubin, UA: Negative   1543 Blood, UA(!): Large   1554 RBC, UA(!): 10-20   1554 WBC, UA(!): 0-1   1554 Epithelial Cells: Occasional   1554 Bacteria, UA: Occasional     Discussed continued symptomatic and supportive care  Alternating ice/heat, topical muscle rubs, etc   Will prescribe brief course of opioid therapy for pain relief  PDMP queried and no suspicious behaviors noted  Risks/benefits/side effects discussed  Referral info provided for pain/spine for further evaluation and management  Strict return precautions outlined  Also reviewed findings regarding LFTs, low platelets and CT evidence of suspected cirrhosis  This appears to be have been evolving over the past 6 months or longer  Pt advised to see GI in follow up  Advised continued avoidance of hepatoxic agents to include tylenol or tylenol containing products  BP noted to be elevated, pt contributes to his pain  Advised to continue to monitor this and follow up with PCP  Advised outpatient follow up with PCP for recheck or return to ER for change in condition as outlined   Pt verbalized understanding and had no further questions  Pt left in stable, improved condition with mom  MDM  Number of Diagnoses or Management Options  Chronic liver disease: new and requires workup  Hypertension: new and requires workup  Left-sided low back pain with left-sided sciatica: new and requires workup  Thrombocytopenia ([facility] [address] ): established and worsening     Amount and/or Complexity of Data Reviewed  Clinical lab tests: ordered and reviewed  Tests in the radiology section of CPT®: ordered and reviewed  Decide to obtain previous medical records or to obtain history from someone other than the patient: yes  Obtain history from someone other than the patient: yes  Review and summarize past medical records: yes  Independent visualization of images, tracings, or specimens: yes    Patient Progress  Patient progress: improved        Disposition  Final diagnoses:   Left-sided low back pain with left-sided sciatica   Thrombocytopenia ([facility] [address] )   Chronic liver disease   Hypertension     Time reflects when diagnosis was documented in both MDM as applicable and the Disposition within this note     Time User Action Codes Description Comment    3/13/2020  3:43 PM Hilda Deloris Add [M54 42] Left-sided low back pain with left-sided sciatica     3/13/2020  3:44 PM Jatin Mora [D69 6] Thrombocytopenia ([facility] [address] )     3/13/2020  3:45 PM Hlida Canstefan Add [K76 9] Chronic liver disease     3/13/2020  3:45 PM Ana Ward [address] Hypertension       ED Disposition     ED Disposition Condition Date/Time Comment    Discharge Stable Fri Mar 13, 2020  3:43 PM Acrmelo President Josue discharge to home/self care              Follow-up Information     Follow up With Specialties Details Why Contact Info Additional [facility] Schedule an appointment as soon as possible for a visit   [address] [address]  [facility], [address] , [place], [place], [address]    [facility] Schedule an appointment as soon as possible for a visit   [address] [address] [phone] [facility] [address], [place], [place], [address]    Polly Perez DO Internal Medicine Schedule an appointment as soon as possible for a visit   Urvashi Argueta [address]  [address] Emergency Department Emergency Medicine  As needed [address] [id number] [phone] [place] [facility], [facility], [place], [place], [address]          Discharge Medication List as of 3/13/2020  4:02 PM      START taking these medications    Details   oxyCODONE (ROXICODONE) 5 mg immediate release tablet Take 1 tablet (5 mg total) by mouth every 4 (four) hours as needed for severe painMax Daily Amount: 30 mg, Starting Fri 3/13/2020, Normal         CONTINUE these medications which have NOT CHANGED    Details   cyclobenzaprine (FLEXERIL) 10 mg tablet Take 1 tablet (10 mg total) by mouth 3 (three) times a day as needed for muscle spasms for up to 14 days, Starting Wed 2/19/2020, Until Wed 3/4/2020, Normal      diclofenac (VOLTAREN) 75 mg EC tablet Take 1 tablet (75 mg total) by mouth 2 (two) times a day, Starting Wed 2/12/2020, Until Fri 3/13/2020, Normal      predniSONE 20 mg tablet 3 Tabs PO QDay x 3 Days, Then 2 Tabs PO QDay x 3 Days, Then 1 Tab PO QDay x 3 Days, Then 1/2 Tab PO QDay x 4 Days, Then Stop, Normal           No discharge procedures on file      PDMP Review       Value Time User    PDMP Reviewed  Yes 3/13/2020 10:08 AM Santana Mckeon PA-C          ED Provider  Electronically Signed by           Santana Mckeon PA-C  03/13/20 8294

## 2020-03-13 NOTE — CASE MANAGEMENT
I self referred the patient to discuss resources that might be available to him  He denied needing any help at this time  The patient lives with his mother in a two story house  There is 4 [address] and one flight of 15 steps inside the home  He has no DME  He does not receive meals on wheels or home health services at this time  He is independent with his ADL's and he drives  He uses [facility] in [place] [address]  He has no trouble getting or paying for his medications  He has [employer] and [employer]  The patient did not call his PCP prior to coming to the ED 
yes

## 2020-03-27 ENCOUNTER — TELEPHONE (OUTPATIENT)
Dept: INTERNAL MEDICINE CLINIC | Facility: CLINIC | Age: 50
End: 2020-03-27

## 2020-03-27 NOTE — TELEPHONE ENCOUNTER
Pt's mother called asking if we can send in a rx for motrin 600 mg tab (robyn was taking ming's rx for this and it helped with sciatica) he took 1 a day at night  Can we send in a rx for this for him [facility]?

## 2020-03-27 NOTE — TELEPHONE ENCOUNTER
He has used Diclofenac in the past and that is listed as one of his medication  Should he be using that    PS this is one of Kavon's patients

## 2020-04-01 ENCOUNTER — PATIENT OUTREACH (OUTPATIENT)
Dept: CASE MANAGEMENT | Facility: OTHER | Age: 50
End: 2020-04-01

## 2020-06-23 NOTE — PROGRESS NOTES
PT Discharge    Today's date: 2020  Patient name: Georgina Trinh  : 1970  MRN: [id number]  Referring provider: Leon Denis DO  Dx:   Encounter Diagnosis     ICD-10-CM    1  Acute left-sided low back pain with left-sided sciatica M54 [id number]        Start Time: 1000  Stop Time: 1100  Total time in clinic (min): 60 minutes    Assessment  Assessment details: Georgina Trinh is a 54 y o  male presenting to outpatient physical therapy with diagnosis of Acute left-sided low back pain with left-sided sciatica  (primary encounter diagnosis)  Current signs/sx and PT exam findings appear consistent with LBP with underlying L hip OA with c/c of L thigh/groin pain with walking/WB and laying on L side in bed  He demonstrates + reproduction of 1* sx with L hip scour, FADIR, and resisted SLR flexion supine; pain is worse in WB on LLE and relieved at rest/sitting; he demonstrates + morning stiffness L hip and demonstrates painful and limited L hip IR; sx are resolved with long axis distraction of L hip  Pt demonstrates altered gait, L hip weakness, and reduced LE flexibility  Patient's present functional limitations include difficulty with ADLs with increased need for assistance, reliance on medication and/or modalities for pain relief, poor tolerance for functional mobility and activity, and difficulty completing work and home responsibilities  Patient to benefit from skilled outpatient physical therapy 1-2x/week for 4-6 weeks in order to reduce pain, maximize pain free range of motion, increase strength and stability, and improve functional mobility/functional activity in order to maximize return to prior level of function with reduced limitations  He has a high co-pay for PT services; increased time spent today reviewing HEP as noted; understanding demonstrated  Thank you for your referral     Update :  Anderson Yost will be discharged from outpatient physical therapy care due to expiration of plan of care    Last attended tx session was on 2/28 (one session after IE) ;did not return to therapy after this date  No objective measures updated, Michell Ferrera is not present at time of discharge  Impairments: abnormal gait, abnormal muscle firing, abnormal or restricted ROM, activity intolerance, impaired physical strength, lacks appropriate home exercise program, pain with function and weight-bearing intolerance    Symptom irritability: moderate  Goals  STGs to be achieved in 4-6 weeks:    1  Pt will report reduced L hip/thigh pain levels "at worst" by at least 2 points (0-10 scale) in order to allow improved tolerance for functional mobility and reduced reliance on medication or modalities for pain relief  2  Pt will demonstrate improved AROM of L hip IR by at least 5-10* with reduced pain in order to reduce pt difficulty with gait and transfers and restore normal joint mobility  3  Pt will demonstrate improved strength of L hip ER and glute med by at least 1/2-1 MMT in order to improve weight bearing joint stability and allow for restoration of normal joint mechanics to reduce pain and improve function  4  Pt will demonstrate normalized standing posture without lateral deviation  LTGs to be achieved in 8-12 weeks:    1  Pt will be independent with HEP, demonstrating proper technique with exercises and understanding of self progression of program without need for cueing or assistance  2  Pt will report minimized pain levels with at least 75% reduction in pain since [facility]  3  Pt will demonstrate WFL AROM and strength of L hip without pain  4  Pt will demonstrate normalized gait without deviations or need for assistive device or external support  5  Pt will report return to work without limitations  6  FOTO will reflect score at discharge that is greater than or equal to predicted level  Subjective Evaluation    History of Present Illness  Mechanism of injury: Pt notes onset of back pain late Dec 2019   Notes he fell down his steps at home in 2019; notes he sprained and "fx his L ankle really bad"; was in a WC for "3 months and in a boot"  Notes around late Dec, he developed LBP  No known JENNYFER per pt, however notes 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  Pain started as L sided LBP  Gradually worsened with spreading of pain into L lateral hip to anterior L thigh to the knee  Notes at times has has had n/t in L lateral leg proximally only  No sx into ankle/foot  No other n/t per pt  Notes pain can shoot into L groin often  Notes his LLE feels "heavy"; denies any LE buckling/giving way  No night pain, unexplained weight loss, no consitutional sx, no change in bowel/bladder function, and no saddle anesthesia  Note his L hip/thigh can be "stiff" in the morning; reduced with activity/movement  F/u with his 1* care MD; given medication which pt notes did not help  Notes Xrays were + for "OA and spasms" per pt L/S; also notes he was told he has hip OA  RTD and reported cont'd back pain; referred to PT at this time  RTD 3/4/2020  Also has a PM appt 3/9  Notes he has had "back problems for years" (20+ per pt)  Notes it rarely goes into the leg like it is now  Notes past benefit with injections with intermittent relief of sx  Notes sx would typically improve after a few weeks               Recurrent probem    Quality of life: fair    Pain  Current pain ratin  At best pain ratin  At worst pain ratin  Location: L/S  L hip/thigh/knee, L groin pain   Quality: throbbing and dull ache ("swollen", "pressure")  Aggravating factors: standing, walking, stair climbing and lifting  Progression: no change    Social Support  Steps to enter house: yes  3  Stairs in house: yes   Lives in: multiple-level home  Lives with: young children (Mom)    Employment status: not working (Presently [employer]; employed in a [employer])  Hand dominance: left      Diagnostic Tests  X-ray: abnormal  Treatments  Previous treatment: medication  Current treatment: medication and physical therapy  Patient Goals  Patient goals for therapy: decreased pain, increased motion, increased strength, independence with ADLs/IADLs and return to work  Patient's goals regarding treatment: "strengthening back and core mms"         Objective     Postural Observations  Seated posture: poor  Standing posture: fair    Additional Postural Observation Details  Seated: Forward head/rounded shoulders  Increased thoracic kyphosis   B scapular depression and protraction with mild winging   Increased PPT    Standing:   Slight R lateral shift noted in standing vs WS to R to unload LLE; Will cont to assess    Neurological Testing     Sensation     Lumbar   Left   Intact: light touch    Right   Intact: light touch    Reflexes   Left   Patellar (L4): absent (0)  Achilles (S1): normal (2+)  Clonus sign: negative    Right   Patellar (L4): normal (2+)  Achilles (S1): normal (2+)  Clonus sign: negative    Active Range of Motion     Lumbar   Flexion:  Restriction level: minimal  Extension:  Restriction level: moderate  Left lateral flexion:  Restriction level: minimal  Right lateral flexion: Active right lumbar lateral flexion: Pain L L/S   with pain Restriction level: minimal  Left rotation: Active left lumbar rotation: Pain L anterior/medial thigh to knee  with pain Restriction level: moderate  Right rotation:  Restriction level: minimal  Left Hip   Flexion: WFL  Extension: 7 degrees   Abduction: WFL  Adduction: WFL  External rotation (90/90): Left hip active external rotation 90/90: 75%   Internal rotation (90/90): Left hip active internal rotation 90/90: 25% groin/thigh pain   with pain    Additional Active Range of Motion Details  No change in sx after AROM assessment in standing  Will cont to assess     Mechanical Assessment    Cervical      Thoracic      Lumbar    Standing flexion: repeated movements   Pain location:peripheralized  Pain intensity: worse  Increased L/S pain to 4-5/10; produced L knee pain   Standing extension: repeated movements  Pain location: no change  4-5/10 L/S pain and knee pain remained; "tightness" R thigh; increased ROM   Right sidegliding: repeated movements  Pain location: centralized  Reduced LBP to 3/10, resolved L knee pain, cont'd "tightness" R thigh; improved symmetricla posture and increased ROM    Strength/Myotome Testing     Lumbar   Left   Heel walk: abnormal  Toe walk: normal    Right   Heel walk: normal  Toe walk: normal    Left Hip   Planes of Motion   Flexion: 4  Left hip extension strength: TBA  Abduction: 4+  Adduction: 4+  External rotation: 4+    Isolated Muscles   Gluteus medius: 4-    Right Hip   Planes of Motion   Flexion: 4+  Abduction: 4+  Adduction: 4+  External rotation: 5    Left Knee   Flexion: 4+  Extension: 4    Right Knee   Flexion: 4+  Extension: 5    Left Ankle/Foot   Dorsiflexion: 4  Plantar flexion: 4+  Great toe extension: 4-    Right Ankle/Foot   Dorsiflexion: 4+  Plantar flexion: 5  Great toe extension: 4    Additional Strength Details  Slight weakness noted L ankle vs R with heel walk/toe walk; hx of L ankle injury 11/2019; will cont to assess    Min L/S and L hip pain with L hip flexion seated; moderate L thigh/groin pain with resisted SLR flexion supine      Tests     Lumbar     Left   Negative crossed SLR, passive SLR and slump test      Right   Negative crossed SLR, passive SLR and slump test      Left Hip   Positive Ely's, FADIR and scour  Negative ANTONIA       Additional Tests Details  + reproduction of pt's thigh/groin pain with L FADIR, resisted SLR flexion supine, and scour   + reduced L hip/thigh/L L/S pain with long axis distraction  L hip     Ambulation     Ambulation: Level Surfaces     Additional Level Surfaces Ambulation Details  Reduced L stance time  Increased lateral flexion with L stance         Flowsheet Rows      Most Recent Value   PT/OT G-Codes   Current Score  32 Projected Score  55

## 2020-07-20 ENCOUNTER — OFFICE VISIT (OUTPATIENT)
Dept: INTERNAL MEDICINE CLINIC | Facility: CLINIC | Age: 50
End: 2020-07-20
Payer: COMMERCIAL

## 2020-07-20 ENCOUNTER — APPOINTMENT (OUTPATIENT)
Dept: LAB | Facility: CLINIC | Age: 50
End: 2020-07-20
Payer: COMMERCIAL

## 2020-07-20 ENCOUNTER — APPOINTMENT (OUTPATIENT)
Dept: RADIOLOGY | Facility: CLINIC | Age: 50
End: 2020-07-20
Payer: COMMERCIAL

## 2020-07-20 VITALS
HEIGHT: 71 IN | TEMPERATURE: 99.1 F | BODY MASS INDEX: 35.45 KG/M2 | OXYGEN SATURATION: 98 % | DIASTOLIC BLOOD PRESSURE: 90 MMHG | SYSTOLIC BLOOD PRESSURE: 160 MMHG | HEART RATE: 112 BPM | WEIGHT: 253.25 LBS

## 2020-07-20 DIAGNOSIS — G89.29 CHRONIC PAIN OF LEFT KNEE: ICD-10-CM

## 2020-07-20 DIAGNOSIS — M25.562 CHRONIC PAIN OF LEFT KNEE: Primary | ICD-10-CM

## 2020-07-20 DIAGNOSIS — I10 ESSENTIAL HYPERTENSION: ICD-10-CM

## 2020-07-20 DIAGNOSIS — R26.9 GAIT ABNORMALITY: ICD-10-CM

## 2020-07-20 DIAGNOSIS — M23.92 INTERNAL DERANGEMENT OF LEFT KNEE: ICD-10-CM

## 2020-07-20 DIAGNOSIS — G89.29 CHRONIC PAIN OF LEFT KNEE: Primary | ICD-10-CM

## 2020-07-20 DIAGNOSIS — M62.562 ATROPHY OF MUSCLE OF LEFT LOWER LEG: ICD-10-CM

## 2020-07-20 DIAGNOSIS — M25.562 CHRONIC PAIN OF LEFT KNEE: ICD-10-CM

## 2020-07-20 DIAGNOSIS — D69.6 THROMBOCYTOPENIA (HCC): ICD-10-CM

## 2020-07-20 DIAGNOSIS — R35.0 FREQUENT URINATION: ICD-10-CM

## 2020-07-20 DIAGNOSIS — E78.2 MIXED HYPERLIPIDEMIA: ICD-10-CM

## 2020-07-20 DIAGNOSIS — E11.9 TYPE 2 DIABETES MELLITUS WITHOUT COMPLICATION, WITHOUT LONG-TERM CURRENT USE OF INSULIN (HCC): ICD-10-CM
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
## 2020-07-20 LAB
ALBUMIN SERPL BCP-MCNC: 3.7 G/DL (ref 3.5–5)
ALP SERPL-CCNC: 127 U/L (ref 46–116)
ALT SERPL W P-5'-P-CCNC: 89 U/L (ref 12–78)
ANION GAP SERPL CALCULATED.3IONS-SCNC: 8 MMOL/L (ref 4–13)
AST SERPL W P-5'-P-CCNC: 65 U/L (ref 5–45)
BASOPHILS # BLD AUTO: 0.06 THOUSANDS/ΜL (ref 0–0.1)
BASOPHILS NFR BLD AUTO: 1 % (ref 0–1)
BILIRUB SERPL-MCNC: 1.06 MG/DL (ref 0.2–1)
BUN SERPL-MCNC: 14 MG/DL (ref 5–25)
CALCIUM SERPL-MCNC: 9.7 MG/DL (ref 8.3–10.1)
CHLORIDE SERPL-SCNC: 100 MMOL/L (ref 100–108)
CO2 SERPL-SCNC: 30 MMOL/L (ref 21–32)
CREAT SERPL-MCNC: 1.1 MG/DL (ref 0.6–1.3)
EOSINOPHIL # BLD AUTO: 0.09 THOUSAND/ΜL (ref 0–0.61)
EOSINOPHIL NFR BLD AUTO: 1 % (ref 0–6)
ERYTHROCYTE [DISTWIDTH] IN BLOOD BY AUTOMATED COUNT: 13.8 % (ref 11.6–15.1)
EST. AVERAGE GLUCOSE BLD GHB EST-MCNC: 266 MG/DL
GFR SERPL CREATININE-BSD FRML MDRD: 79 ML/MIN/1.73SQ M
GLUCOSE P FAST SERPL-MCNC: 225 MG/DL (ref 65–99)
HBA1C MFR BLD: 10.9 %
HCT VFR BLD AUTO: 54.6 % (ref 36.5–49.3)
HGB BLD-MCNC: 17.3 G/DL (ref 12–17)
IMM GRANULOCYTES # BLD AUTO: 0.03 THOUSAND/UL (ref 0–0.2)
IMM GRANULOCYTES NFR BLD AUTO: 0 % (ref 0–2)
LDLC SERPL DIRECT ASSAY-MCNC: 106 MG/DL (ref 0–100)
LYMPHOCYTES # BLD AUTO: 2.04 THOUSANDS/ΜL (ref 0.6–4.47)
LYMPHOCYTES NFR BLD AUTO: 26 % (ref 14–44)
MCH RBC QN AUTO: 27.2 PG (ref 26.8–34.3)
MCHC RBC AUTO-ENTMCNC: 31.7 G/DL (ref 31.4–37.4)
MCV RBC AUTO: 86 FL (ref 82–98)
MONOCYTES # BLD AUTO: 0.88 THOUSAND/ΜL (ref 0.17–1.22)
MONOCYTES NFR BLD AUTO: 11 % (ref 4–12)
NEUTROPHILS # BLD AUTO: 4.85 THOUSANDS/ΜL (ref 1.85–7.62)
NEUTS SEG NFR BLD AUTO: 61 % (ref 43–75)
NRBC BLD AUTO-RTO: 0 /100 WBCS
PLATELET # BLD AUTO: 118 THOUSANDS/UL (ref 149–390)
PMV BLD AUTO: 12.6 FL (ref 8.9–12.7)
POTASSIUM SERPL-SCNC: 4.1 MMOL/L (ref 3.5–5.3)
PROT SERPL-MCNC: 8.5 G/DL (ref 6.4–8.2)
RBC # BLD AUTO: 6.35 MILLION/UL (ref 3.88–5.62)
SL AMB  POCT GLUCOSE, UA: NEGATIVE
SL AMB LEUKOCYTE ESTERASE,UA: ABNORMAL
SL AMB POCT BILIRUBIN,UA: NEGATIVE
SL AMB POCT BLOOD,UA: NEGATIVE
SL AMB POCT CLARITY,UA: CLEAR
SL AMB POCT COLOR,UA: ABNORMAL
SL AMB POCT GLUCOSE BLD: 250
SL AMB POCT KETONES,UA: ABNORMAL
SL AMB POCT NITRITE,UA: NEGATIVE
SL AMB POCT PH,UA: 5
SL AMB POCT SPECIFIC GRAVITY,UA: 1.03
SL AMB POCT URINE PROTEIN: ABNORMAL
SL AMB POCT UROBILINOGEN: NORMAL
SODIUM SERPL-SCNC: 138 MMOL/L (ref 136–145)
TRIGL SERPL-MCNC: 79 MG/DL
WBC # BLD AUTO: 7.95 THOUSAND/UL (ref 4.31–10.16)

## 2020-07-20 PROCEDURE — 80053 COMPREHEN METABOLIC PANEL: CPT

## 2020-07-20 PROCEDURE — 3077F SYST BP >= 140 MM HG: CPT | Performed by: INTERNAL MEDICINE

## 2020-07-20 PROCEDURE — 87086 URINE CULTURE/COLONY COUNT: CPT | Performed by: INTERNAL MEDICINE

## 2020-07-20 PROCEDURE — 83036 HEMOGLOBIN GLYCOSYLATED A1C: CPT

## 2020-07-20 PROCEDURE — 82948 REAGENT STRIP/BLOOD GLUCOSE: CPT | Performed by: INTERNAL MEDICINE

## 2020-07-20 PROCEDURE — 73564 X-RAY EXAM KNEE 4 OR MORE: CPT

## 2020-07-20 PROCEDURE — 1036F TOBACCO NON-USER: CPT | Performed by: INTERNAL MEDICINE

## 2020-07-20 PROCEDURE — 3080F DIAST BP >= 90 MM HG: CPT | Performed by: INTERNAL MEDICINE

## 2020-07-20 PROCEDURE — 99213 OFFICE O/P EST LOW 20 MIN: CPT | Performed by: INTERNAL MEDICINE

## 2020-07-20 PROCEDURE — 3061F NEG MICROALBUMINURIA REV: CPT | Performed by: INTERNAL MEDICINE

## 2020-07-20 PROCEDURE — 85025 COMPLETE CBC W/AUTO DIFF WBC: CPT

## 2020-07-20 PROCEDURE — 36415 COLL VENOUS BLD VENIPUNCTURE: CPT

## 2020-07-20 PROCEDURE — 3008F BODY MASS INDEX DOCD: CPT | Performed by: INTERNAL MEDICINE

## 2020-07-20 PROCEDURE — 3046F HEMOGLOBIN A1C LEVEL >9.0%: CPT | Performed by: INTERNAL MEDICINE

## 2020-07-20 PROCEDURE — 81002 URINALYSIS NONAUTO W/O SCOPE: CPT | Performed by: INTERNAL MEDICINE

## 2020-07-20 PROCEDURE — 83721 ASSAY OF BLOOD LIPOPROTEIN: CPT

## 2020-07-20 PROCEDURE — 84478 ASSAY OF TRIGLYCERIDES: CPT

## 2020-07-20 RX ORDER — CHOLECALCIFEROL (VITAMIN D3) 100000/G
POWDER (GRAM) MISCELLANEOUS
COMMUNITY

## 2020-07-20 RX ORDER — MULTIVIT WITH MINERALS/LUTEIN
1000 TABLET ORAL DAILY
COMMUNITY

## 2020-07-20 NOTE — PROGRESS NOTES
BMI Counseling: There is no height or weight on file to calculate BMI  The BMI is above normal  Nutrition recommendations include decreasing portion sizes and encouraging healthy choices of fruits and vegetables  Exercise recommendations include moderate physical activity 150 minutes/week  No pharmacotherapy was ordered  Assessment/Plan:  Problem List Items Addressed This Visit        Endocrine    Type 2 diabetes mellitus without complication ([facility] [address] )    Relevant Orders    Comprehensive metabolic panel    Hemoglobin A1C    LDL cholesterol, direct    Triglycerides    POCT blood glucose       Cardiovascular and Mediastinum    Essential hypertension       Other    Mixed hyperlipidemia    Relevant Orders    LDL cholesterol, direct    Triglycerides    Thrombocytopenia (HCC)    Relevant Orders    CBC and differential      Other Visit Diagnoses     Chronic pain of left knee    -  Primary    Relevant Orders    Ambulatory referral to Physical Therapy    XR knee 4+ vw left injury    MRI knee left  wo contrast    Gait abnormality        Relevant Orders    Ambulatory referral to Physical Therapy    XR knee 4+ vw left injury    MRI knee left  wo contrast    Atrophy of muscle of left lower leg        Relevant Orders    Ambulatory referral to Physical Therapy    XR knee 4+ vw left injury    MRI knee left  wo contrast    Fall, initial encounter        Relevant Orders    Ambulatory referral to Physical Therapy    XR knee 4+ vw left injury    MRI knee left  wo contrast    Frequent urination        Relevant Orders    Urine culture    POCT urine dip    POCT blood glucose    Internal derangement of left knee        Relevant Orders    XR knee 4+ vw left injury           Diagnoses and all orders for this visit:    Chronic pain of left knee  -     Ambulatory referral to Physical Therapy; Future  -     Cancel: MRI knee right  wo contrast; Future  -     XR knee 4+ vw left injury;  Future  -     MRI knee left  wo contrast; Future    Gait abnormality  -     Ambulatory referral to Physical Therapy; Future  -     Cancel: MRI knee right  wo contrast; Future  -     XR knee 4+ vw left injury; Future  -     MRI knee left  wo contrast; Future    Atrophy of muscle of left lower leg  -     Ambulatory referral to Physical Therapy; Future  -     Cancel: MRI knee right  wo contrast; Future  -     XR knee 4+ vw left injury; Future  -     MRI knee left  wo contrast; Future    Fall, initial encounter  -     Ambulatory referral to Physical Therapy; Future  -     Cancel: MRI knee right  wo contrast; Future  -     XR knee 4+ vw left injury; Future  -     MRI knee left  wo contrast; Future    Frequent urination  -     Urine culture  -     POCT urine dip  -     POCT blood glucose    Thrombocytopenia (HCC)  -     CBC and differential; Future    Mixed hyperlipidemia  -     LDL cholesterol, direct; Future  -     Triglycerides; Future    Type 2 diabetes mellitus without complication, without long-term current use of insulin (HCC)  -     Comprehensive metabolic panel; Future  -     Hemoglobin A1C; Future  -     LDL cholesterol, direct; Future  -     Triglycerides; Future  -     POCT blood glucose    Essential hypertension    Internal derangement of left knee  -     XR knee 4+ vw left injury; Future    Other orders  -     Ascorbic Acid (VITAMIN C) 1000 MG tablet; Take 1,000 mg by mouth daily  -     Cholecalciferol (VITAMIN D3) 951613 UNIT/GM POWD; by Does not apply route        No problem-specific Assessment & Plan notes found for this encounter  A/P: Sugars was 250  Urine dip with some WBC's and glucose, but otherwise negative  Will send for urine c/s  PE is impressive and suspect internal derangement  Will check an MRI and start PT  Most likely need to see ortho  Needs to get in for routine and will order labs to be done today and RTC one week for f/u MRI and routine  Continue current treatment otherwise   Discussed BMI and will give information on diet and exercise  Subjective:      Patient ID: Elizabeth Perry is a 52 y o  male  WM with h/o dm, hyperlipidemia, but not seen in quite awhile due to no insurance, presents for two issues  First, fell in 12/19 and injured his left ankle  Was suppose to go for PT, but didn't  Reports crawling around the house and felt a "pop" in his left knee  Since then has been unable to fully extend the knee or wt bear  Needs a walker to get around  No swelling or redness  Next, frequent urination w/o burning/discomfort, urgency, or blood  Not checking sugars  The following portions of the patient's history were reviewed and updated as appropriate:   He has a past medical history of Diabetes mellitus ([facility] [address] Hypertension, and Kidney stones  ,  does not have any pertinent problems on file  ,   has a past surgical history that includes Neck surgery and Cholecystectomy  ,  family history includes Breast cancer in his mother; Diabetes in his father, mother, and sister; Fibromyalgia in his sister; Hypertension in his mother  ,   reports that he has never smoked  He has never used smokeless tobacco  He reports that he does not drink alcohol or use drugs  ,  is allergic to codeine and penicillins     Current Outpatient Medications   Medication Sig Dispense Refill    Ascorbic Acid (VITAMIN C) 1000 MG tablet Take 1,000 mg by mouth daily      Cholecalciferol (VITAMIN D3) 172154 UNIT/GM POWD by Does not apply route       No current facility-administered medications for this visit  Review of Systems   Constitutional: Positive for activity change  Negative for chills, diaphoresis, fatigue and fever  Respiratory: Negative for cough, chest tightness, shortness of breath and wheezing  Cardiovascular: Negative for chest pain, palpitations and leg swelling  Gastrointestinal: Negative for abdominal pain, constipation, diarrhea, nausea and vomiting  Genitourinary: Positive for frequency   Negative for difficulty urinating, discharge, dysuria, flank pain, hematuria, penile pain and urgency  Musculoskeletal: Positive for arthralgias and gait problem  Negative for myalgias  Neurological: Positive for weakness  Negative for light-headedness and headaches  Psychiatric/Behavioral: Negative for confusion  The patient is not nervous/anxious  PHQ-9 Depression Screening    PHQ-9:    Frequency of the following problems over the past two weeks:             Objective:  Vitals:    07/20/20 1039   BP: 160/90   Pulse: (!) 112   Temp: 99 1 °F (37 3 °C)   SpO2: 98%   Weight: 115 kg (253 lb 4 oz)   Height: 5' 11" (1 803 m)     Body mass index is 35 32 kg/m²  Physical Exam   Constitutional: He is oriented to person, place, and time  He appears well-developed and well-nourished  No distress  HENT:   Head: Normocephalic and atraumatic  Mouth/Throat: Oropharynx is clear and moist    Eyes: Pupils are equal, round, and reactive to light  Conjunctivae and EOM are normal    Cardiovascular: Normal rate, regular rhythm and normal heart sounds  Pulmonary/Chest: Effort normal and breath sounds normal  No respiratory distress  He has no wheezes  He has no rales  Musculoskeletal: He exhibits tenderness and deformity  He exhibits no edema  Left knee any gross deformities, but some atrophy of the leg muscles, increase temp, erythema, or swelling  No effusion or ballotment  No crepitus  Collaterals intact  Negative Drawer's ? Denzel's  ROM decreased extension by 25%  Tenderness none on palpitation       Neurological: He is alert and oriented to person, place, and time  Psychiatric: He has a normal mood and affect  His behavior is normal  Judgment and thought content normal    Nursing note and vitals reviewed

## 2020-07-21 LAB — BACTERIA UR CULT: NORMAL

## 2020-07-28 ENCOUNTER — HOSPITAL ENCOUNTER (OUTPATIENT)
Dept: MRI IMAGING | Facility: HOSPITAL | Age: 50
Discharge: HOME | End: 2020-07-28
Payer: COMMERCIAL

## 2020-07-28 DIAGNOSIS — M62.562 ATROPHY OF MUSCLE OF LEFT LOWER LEG: ICD-10-CM

## 2020-07-28 DIAGNOSIS — R26.9 GAIT ABNORMALITY: ICD-10-CM

## 2020-07-28 DIAGNOSIS — M25.562 CHRONIC PAIN OF LEFT KNEE: ICD-10-CM
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
## 2020-07-28 DIAGNOSIS — G89.29 CHRONIC PAIN OF LEFT KNEE: ICD-10-CM

## 2020-07-28 PROCEDURE — 73721 MRI JNT OF LWR EXTRE W/O DYE: CPT

## 2020-07-30 ENCOUNTER — TELEPHONE (OUTPATIENT)
Dept: INTERNAL MEDICINE CLINIC | Facility: CLINIC | Age: 50
End: 2020-07-30

## 2020-07-30 ENCOUNTER — OFFICE VISIT (OUTPATIENT)
Dept: INTERNAL MEDICINE CLINIC | Facility: CLINIC | Age: 50
End: 2020-07-30
Payer: COMMERCIAL

## 2020-07-30 VITALS
RESPIRATION RATE: 18 BRPM | SYSTOLIC BLOOD PRESSURE: 148 MMHG | DIASTOLIC BLOOD PRESSURE: 80 MMHG | HEART RATE: 90 BPM | WEIGHT: 255 LBS | TEMPERATURE: 98.6 F | BODY MASS INDEX: 35.7 KG/M2 | HEIGHT: 71 IN | OXYGEN SATURATION: 98 %

## 2020-07-30 DIAGNOSIS — R79.89 ELEVATED LFTS: ICD-10-CM

## 2020-07-30 DIAGNOSIS — D75.1 SECONDARY ERYTHROCYTOSIS: ICD-10-CM

## 2020-07-30 DIAGNOSIS — G89.29 CHRONIC PAIN OF LEFT KNEE: ICD-10-CM

## 2020-07-30 DIAGNOSIS — M62.562 ATROPHY OF MUSCLE OF LEFT LOWER LEG: ICD-10-CM

## 2020-07-30 DIAGNOSIS — E11.9 TYPE 2 DIABETES MELLITUS WITHOUT COMPLICATION, WITHOUT LONG-TERM CURRENT USE OF INSULIN (HCC): Primary | ICD-10-CM

## 2020-07-30 DIAGNOSIS — M25.562 CHRONIC PAIN OF LEFT KNEE: ICD-10-CM

## 2020-07-30 DIAGNOSIS — I10 ESSENTIAL HYPERTENSION: ICD-10-CM

## 2020-07-30 DIAGNOSIS — E78.2 MIXED HYPERLIPIDEMIA: ICD-10-CM

## 2020-07-30 DIAGNOSIS — R26.9 GAIT ABNORMALITY: ICD-10-CM

## 2020-07-30 PROCEDURE — 4010F ACE/ARB THERAPY RXD/TAKEN: CPT | Performed by: INTERNAL MEDICINE

## 2020-07-30 PROCEDURE — 3079F DIAST BP 80-89 MM HG: CPT | Performed by: INTERNAL MEDICINE

## 2020-07-30 PROCEDURE — 99214 OFFICE O/P EST MOD 30 MIN: CPT | Performed by: INTERNAL MEDICINE

## 2020-07-30 PROCEDURE — 3008F BODY MASS INDEX DOCD: CPT | Performed by: INTERNAL MEDICINE

## 2020-07-30 PROCEDURE — 3077F SYST BP >= 140 MM HG: CPT | Performed by: INTERNAL MEDICINE

## 2020-07-30 PROCEDURE — 3046F HEMOGLOBIN A1C LEVEL >9.0%: CPT | Performed by: INTERNAL MEDICINE

## 2020-07-30 PROCEDURE — 1036F TOBACCO NON-USER: CPT | Performed by: INTERNAL MEDICINE

## 2020-07-30 RX ORDER — LISINOPRIL 10 MG/1
10 TABLET ORAL DAILY
Qty: 90 TABLET | Refills: 3 | Status: SHIPPED | OUTPATIENT
Start: 2020-07-30 | End: 2020-08-03

## 2020-07-30 RX ORDER — ATORVASTATIN CALCIUM 20 MG/1
20 TABLET, FILM COATED ORAL DAILY
Qty: 90 TABLET | Refills: 3 | Status: SHIPPED | OUTPATIENT
Start: 2020-07-30 | End: 2021-07-21

## 2020-07-30 NOTE — PROGRESS NOTES
Assessment/Plan:  Problem List Items Addressed This Visit        Endocrine    Type 2 diabetes mellitus without complication ([facility] [address] ) - Primary    Relevant Medications    metFORMIN (GLUCOPHAGE) 500 mg tablet    lisinopril (ZESTRIL) 10 mg tablet    atorvastatin (LIPITOR) 20 mg tablet       Cardiovascular and Mediastinum    Essential hypertension    Relevant Medications    lisinopril (ZESTRIL) 10 mg tablet       Other    Mixed hyperlipidemia    Relevant Medications    atorvastatin (LIPITOR) 20 mg tablet      Other Visit Diagnoses     Secondary erythrocytosis        Elevated LFTs        Chronic pain of left knee        Relevant Orders    Ambulatory referral to Orthopedic Surgery    Gait abnormality        Relevant Orders    Ambulatory referral to Orthopedic Surgery    Atrophy of muscle of left lower leg        Relevant Orders    Ambulatory referral to Orthopedic Surgery           Diagnoses and all orders for this visit:    Type 2 diabetes mellitus without complication, without long-term current use of insulin (HCC)  -     metFORMIN (GLUCOPHAGE) 500 mg tablet; ONe po q AM x 3 and then one po bid x 3 and then Two po bid   -     lisinopril (ZESTRIL) 10 mg tablet; Take 1 tablet (10 mg total) by mouth daily  -     atorvastatin (LIPITOR) 20 mg tablet; Take 1 tablet (20 mg total) by mouth daily    Mixed hyperlipidemia  -     atorvastatin (LIPITOR) 20 mg tablet; Take 1 tablet (20 mg total) by mouth daily    Secondary erythrocytosis    Elevated LFTs    Chronic pain of left knee  -     Ambulatory referral to Orthopedic Surgery; Future    Gait abnormality  -     Ambulatory referral to Orthopedic Surgery; Future    Atrophy of muscle of left lower leg  -     Ambulatory referral to Orthopedic Surgery; Future    Essential hypertension  -     lisinopril (ZESTRIL) 10 mg tablet; Take 1 tablet (10 mg total) by mouth daily        No problem-specific Assessment & Plan notes found for this encounter  A/P: Discussed labs and MRI   Will refer to PT and ortho for the knee, but pt reports his insurance may not cover the PT  ??cause of the knee issues  Minimal LBP and past xray of LS spine 2/20 with some DDD  If knee not the issue, may need to look at the back or even an EMG  Suspect elevated LFT's due to a fatty liver  ? H/H elevation  May have some TRES and will need to screen next visit  Will get pt set up to start home monitoring of sugars  Will start metformin, ACEI for BP and sugars, and lipitor  Feel the benefits outweigh the risk given the borderline LFT's  RTC one month for f/u knee, dm, bp, and will need CMP  Screen for TRES  Subjective:      Patient ID: Rashi Griffin is a 52 y o  male  WM RTC for f/u left knee pain, labs, and routine  Still with left knee pain and locking  MRI was normal  Labs with uncontrolled DM, elevated LFT's, elevated H/H, Low platelets, and elevated LDL given the diabetes  No better  Continues with left knee pain and trouble walking  NO swelling  Trouble bending the leg    Doesn't check sugars  The following portions of the patient's history were reviewed and updated as appropriate:   He has a past medical history of Diabetes mellitus ([address] ), Hypertension, and Kidney stones  ,  does not have any pertinent problems on file  ,   has a past surgical history that includes Neck surgery and Cholecystectomy  ,  family history includes Breast cancer in his mother; Diabetes in his father, mother, and sister; Fibromyalgia in his sister; Hypertension in his mother  ,   reports that he has never smoked  He has never used smokeless tobacco  He reports that he does not drink alcohol or use drugs  ,  is allergic to codeine and penicillins     Current Outpatient Medications   Medication Sig Dispense Refill    Ascorbic Acid (VITAMIN C) 1000 MG tablet Take 1,000 mg by mouth daily      Cholecalciferol (VITAMIN D3) 452727 UNIT/GM POWD by Does not apply route      atorvastatin (LIPITOR) 20 mg tablet Take 1 tablet (20 mg total) by mouth daily 90 tablet 3    lisinopril (ZESTRIL) 10 mg tablet Take 1 tablet (10 mg total) by mouth daily 90 tablet 3    metFORMIN (GLUCOPHAGE) 500 mg tablet ONe po q AM x 3 and then one po bid x 3 and then Two po bid  180 tablet 3     No current facility-administered medications for this visit  Review of Systems   Constitutional: Positive for activity change  Negative for chills, diaphoresis, fatigue and fever  Respiratory: Negative for cough, chest tightness, shortness of breath and wheezing  Cardiovascular: Negative for chest pain, palpitations and leg swelling  Gastrointestinal: Negative for abdominal pain, constipation, diarrhea, nausea and vomiting  Genitourinary: Negative for difficulty urinating, dysuria and frequency  Musculoskeletal: Positive for arthralgias and gait problem  Negative for joint swelling and myalgias  Neurological: Negative for dizziness, seizures, syncope, weakness, light-headedness and headaches  Psychiatric/Behavioral: Negative for confusion, dysphoric mood and sleep disturbance  The patient is not nervous/anxious  PHQ-9 Depression Screening    PHQ-9:    Frequency of the following problems over the past two weeks:             Objective:  Vitals:    07/30/20 0749   BP: 148/80   Pulse: 90   Resp: 18   Temp: 98 6 °F (37 °C)   TempSrc: Tympanic   SpO2: 98%   Weight: 116 kg (255 lb)   Height: 5' 11" (1 803 m)     Body mass index is 35 57 kg/m²  Physical Exam   Constitutional: He is oriented to person, place, and time  He appears well-developed and well-nourished  No distress  HENT:   Head: Normocephalic and atraumatic  Mouth/Throat: Oropharynx is clear and moist    Eyes: Pupils are equal, round, and reactive to light  Conjunctivae and EOM are normal    Neck: Neck supple  No JVD present  Cardiovascular: Normal rate, regular rhythm and normal heart sounds  Pulmonary/Chest: Effort normal and breath sounds normal    Abdominal: Soft   Bowel sounds are normal  He exhibits no distension  Musculoskeletal: He exhibits no edema or tenderness  Neurological: He is alert and oriented to person, place, and time  Psychiatric: He has a normal mood and affect  His behavior is normal  Judgment and thought content normal    Nursing note and vitals reviewed

## 2020-07-30 NOTE — TELEPHONE ENCOUNTER
----- Message from Vivian Palacios DO sent at 7/30/2020  7:58 AM EDT -----  Get pt set up for home glucose monitoring

## 2020-07-30 NOTE — PATIENT INSTRUCTIONS
Type 2 Diabetes in Adults   WHAT YOU NEED TO KNOW:   What is type 2 diabetes? Type 2 diabetes is a disease that affects how your body uses glucose (sugar)  Normally, when the blood sugar level increases, the pancreas makes more insulin  Insulin helps move sugar out of the blood so it can be used for energy  Type 2 diabetes develops because either the body cannot make enough insulin, or it cannot use the insulin correctly  After many years, your pancreas may stop making insulin  What increases my risk for type 2 diabetes? · Obesity    · Physical inactivity    · Older age    · High blood pressure or high cholesterol    · A history of heart disease, gestational diabetes, or polycystic ovary syndrome     · A family member with diabetes    · Being [place] American, , , [place], or [facility]  What are the signs and symptoms of type 2 diabetes? You may have high blood sugar levels for a long time before symptoms appear  You may have any of the following:  · More hunger or thirst than usual     · Frequent urination     · Weight loss without trying     · Blurred vision  How is type 2 diabetes diagnosed? You may need tests to check for type 2 diabetes starting at age 39  You may need any of the following:  · An A1c test  shows the average amount of sugar in your blood over the past 2 to 3 months  Your healthcare provider will tell you the A1c level that is right for you  The goal for your A1c is usually below 7%  Your provider can help you make changes if a check shows the A1c is too high  · A fasting plasma glucose test  is when your blood sugar level is tested after you have not eaten for 8 hours  · A 2-hour plasma glucose test  starts with a blood sugar level check after you have not eaten for 8 hours  You are then given a glucose drink  Your blood sugar level is checked after 2 hours       · A random glucose test  may be done any time of day, no matter how long ago you ate   How is type 2 diabetes treated? Type 2 diabetes can be controlled to prevent damage to your heart, blood vessels, and other organs  The goal is to keep your blood sugar at a normal level  You must eat the right foods, and exercise regularly  You may need 1 or more hypoglycemic medicines or insulin if you cannot control your blood sugar level with nutrition and exercise  You may also need medicine to lower your risk for heart disease  An example includes medicine to lower or control your cholesterol  How do I check my blood sugar level? You will be taught how to check a small drop of blood in a glucose monitor  You will need to check your blood sugar level at least 3 times each day if you are on insulin  Ask your healthcare provider when and how often to check during the day  If you check your blood sugar level before a meal , it should be between 80 and 130 mg/dL  If you check your blood sugar level 1 to 2 hours after a meal , it should be less than 180 mg/dL  Ask your healthcare provider if these are good goals for you  Write down your results, and show them to your healthcare provider  Your provider may use the results to make changes to your medicine, food, and exercise schedules  What should I do if my blood sugar level is too low? Your blood sugar level is too low if it goes below 70 mg/dL  If the level is too low, eat or drink 15 grams of fast-acting carbohydrate  These are found naturally in fruits  Fast-acting carbohydrates will raise your blood sugar level quickly  Examples of 15 grams of fast-acting carbohydrate are 4 ounces (½ cup) of fruit juice or 4 ounces of regular soda  Other examples are 2 tablespoons of raisins or 3 to 4 glucose tablets  Check your blood sugar level 15 minutes later  If the level is still low (less than 100 mg/dL), eat another 15 grams of carbohydrate  When the level returns to 100 mg/dL, eat a snack or meal that contains carbohydrates   This will help prevent another drop in blood sugar  Always carefully follow your healthcare provider's instructions on how to treat low blood sugar levels  What do I need to know about nutrition? A dietitian will help you make a meal plan to keep your blood sugar level steady  Do not skip meals  Your blood sugar level may drop too low if you have taken diabetes medicine and do not eat  · Keep track of carbohydrates (sugar and starchy foods)  Your blood sugar level can get too high if you eat too many carbohydrates  Eat fruits, legumes, vegetables, and whole grains  Your dietitian will help you plan meals and snacks that have the right amount of carbohydrates  · Eat low-fat foods , such as skinless chicken and low-fat milk  · Eat less sodium (salt)  Limit high-sodium foods, such as soy sauce, potato chips, and soup  Do not add salt to food you cook  Limit your use of table salt  You should have less than 2,300 mg of sodium per day  · Eat high-fiber foods , such as vegetables, whole-grain breads, and beans  · Limit alcohol  Alcohol affects your blood sugar level and can make it harder to manage your diabetes  Limit alcohol to 1 drink a day if you are a woman  Limit alcohol to 2 drinks a day if you are a man  A drink of alcohol is 12 ounces of beer, 5 ounces of wine, or 1½ ounces of liquor  How much exercise do I need? Exercise can help keep your blood sugar level steady, decrease your risk of heart disease, and help you lose weight  Stretch before and after you exercise  Exercise for at least 150 minutes every week  Spread this amount of exercise over at least 3 days a week  Do not skip exercise more than 2 days in a row  Include muscle strengthening activities 2 to 3 days each week  Older adults should include balance training 2 to 3 times each week  Activities that help increase balance include yoga and stewart chi  Work with your healthcare provider to create an exercise plan    · Check your blood sugar level before and after exercise  Healthcare providers may tell you to change the amount of insulin you take or food you eat  If your blood sugar level is high, check your blood or urine for ketones before you exercise  Do not exercise if your blood sugar level is high and you have ketones  · If your blood sugar level is less than 100 mg/dL, have a carbohydrate snack before you exercise  Examples are 4 to 6 crackers, ½ banana, 8 ounces (1 cup) of milk, or 4 ounces (½ cup) of juice  Drink water or liquids that do not contain sugar before, during, and after exercise  Ask your dietitian or healthcare provider which liquids you should drink when you exercise  · Do not sit for longer than 30 minutes  If you cannot walk around, at least stand up  This will help you stay active and keep your blood circulating  What else can I do to manage type 2 diabetes? · Check your feet each day for sores  Wear shoes and socks that fit correctly  Do not trim your toenails  Ask your healthcare provider for more information about foot care  · Maintain a healthy weight  Ask your healthcare provider how much you should weigh  A healthy weight can help you control your diabetes and prevent heart disease  Ask your provider to help you create a weight loss plan if you are overweight  Together you can set manageable weight loss goals  · Do not smoke  Nicotine and other chemicals in cigarettes and cigars can cause lung damage and make it more difficult to manage your diabetes  Ask your healthcare provider for information if you currently smoke and need help to quit  Do not use e-cigarettes or smokeless tobacco in place of cigarettes or to help you quit  They still contain nicotine  · Check your blood pressure as directed  Ask your healthcare provider what your blood pressure should be  Most adults with diabetes and high blood pressure should have a systolic blood pressure (first number) less than 140   Your diastolic blood pressure (second number) should be less than 90  · Wear medical alert identification  Wear medical alert jewelry or carry a card that says you have diabetes  Ask your healthcare provider where to get these items  · Ask about vaccines  You have a higher risk for serious illness if you get the flu, pneumonia, or hepatitis  Ask your healthcare provider if you should get a flu, pneumonia, or hepatitis B vaccine, and when to get the vaccine  What are the risks of type 2 diabetes? Uncontrolled diabetes can damage your nerves, veins, and arteries  High blood sugar levels may damage other body tissue and organs over time  Damage to arteries may increase your risk for heart attack and stroke  Nerve damage may also lead to other heart, stomach, and nerve problems  Diabetes is life-threatening if it is not controlled  Control your blood glucose levels to prevent health problems  Call 911 for any of the following:   · You have any of the following signs of a stroke:      ¨ Numbness or drooping on one side of your face     ¨ Weakness in an arm or leg    ¨ Confusion or difficulty speaking    ¨ Dizziness, a severe headache, or vision loss    · You have any of the following signs of a heart attack:      ¨ Squeezing, pressure, or pain in your chest that lasts longer than 5 minutes or returns    ¨ Discomfort or pain in your back, neck, jaw, stomach, or arm     ¨ Trouble breathing    ¨ Nausea or vomiting    ¨ Lightheadedness or a sudden cold sweat, especially with chest pain or trouble breathing  When should I seek immediate care? · You have severe abdominal pain, or the pain spreads to your back  You may also be vomiting  · You have trouble staying awake or focusing  · You are shaking or sweating  · You have blurred or double vision  · Your breath has a fruity, sweet smell  · Your breathing is deep and labored, or rapid and shallow  · Your heartbeat is fast and weak    When should I contact my healthcare provider? · You are vomiting or have diarrhea  · You have an upset stomach and cannot eat the foods on your meal plan  · You feel weak or more tired than usual      · You feel dizzy, have headaches, or are easily irritated  · Your skin is red, warm, dry, or swollen  · You have a wound that does not heal      · You have numbness in your arms or legs  · You have trouble coping with your illness, or you feel anxious or depressed  · You have questions or concerns about your condition or care  CARE AGREEMENT:   You have the right to help plan your care  Learn about your health condition and how it may be treated  Discuss treatment options with your caregivers to decide what care you want to receive  You always have the right to refuse treatment  The above information is an  only  It is not intended as medical advice for individual conditions or treatments  Talk to your doctor, nurse or pharmacist before following any medical regimen to see if it is safe and effective for you  © 2017 [address] [employer] Information is for End User's use only and may not be sold, redistributed or otherwise used for commercial purposes  All illustrations and images included in CareNotes® are the copyrighted property of A D [employer] M [employer]  or [employer]ell[employer]

## 2020-07-31 ENCOUNTER — OFFICE VISIT (OUTPATIENT)
Dept: OBGYN CLINIC | Facility: CLINIC | Age: 50
End: 2020-07-31
Payer: COMMERCIAL

## 2020-07-31 VITALS
HEART RATE: 105 BPM | BODY MASS INDEX: 35.57 KG/M2 | SYSTOLIC BLOOD PRESSURE: 160 MMHG | TEMPERATURE: 98.1 F | DIASTOLIC BLOOD PRESSURE: 81 MMHG | WEIGHT: 255 LBS

## 2020-07-31 DIAGNOSIS — G89.29 CHRONIC PAIN OF LEFT KNEE: ICD-10-CM

## 2020-07-31 DIAGNOSIS — S86.912A KNEE STRAIN, LEFT, INITIAL ENCOUNTER: ICD-10-CM

## 2020-07-31 DIAGNOSIS — M25.562 CHRONIC PAIN OF LEFT KNEE: ICD-10-CM

## 2020-07-31 DIAGNOSIS — M62.562 ATROPHY OF MUSCLE OF LEFT LOWER LEG: ICD-10-CM

## 2020-07-31 DIAGNOSIS — R53.81 PHYSICAL DECONDITIONING: Primary | ICD-10-CM

## 2020-07-31 DIAGNOSIS — R26.9 GAIT ABNORMALITY: ICD-10-CM

## 2020-07-31 PROCEDURE — 3077F SYST BP >= 140 MM HG: CPT | Performed by: ORTHOPAEDIC SURGERY

## 2020-07-31 PROCEDURE — 99203 OFFICE O/P NEW LOW 30 MIN: CPT | Performed by: ORTHOPAEDIC SURGERY

## 2020-07-31 PROCEDURE — 20610 DRAIN/INJ JOINT/BURSA W/O US: CPT | Performed by: ORTHOPAEDIC SURGERY

## 2020-07-31 PROCEDURE — 3046F HEMOGLOBIN A1C LEVEL >9.0%: CPT | Performed by: ORTHOPAEDIC SURGERY

## 2020-07-31 PROCEDURE — 3079F DIAST BP 80-89 MM HG: CPT | Performed by: ORTHOPAEDIC SURGERY

## 2020-07-31 PROCEDURE — 1036F TOBACCO NON-USER: CPT | Performed by: ORTHOPAEDIC SURGERY

## 2020-07-31 RX ORDER — METHYLPREDNISOLONE ACETATE 40 MG/ML
2 INJECTION, SUSPENSION INTRA-ARTICULAR; INTRALESIONAL; INTRAMUSCULAR; SOFT TISSUE
Status: COMPLETED | OUTPATIENT
Start: 2020-07-31 | End: 2020-07-31

## 2020-07-31 RX ORDER — BUPIVACAINE HYDROCHLORIDE 5 MG/ML
3.5 INJECTION, SOLUTION PERINEURAL
Status: COMPLETED | OUTPATIENT
Start: 2020-07-31 | End: 2020-07-31

## 2020-07-31 RX ADMIN — METHYLPREDNISOLONE ACETATE 2 ML: 40 INJECTION, SUSPENSION INTRA-ARTICULAR; INTRALESIONAL; INTRAMUSCULAR; SOFT TISSUE at 09:24

## 2020-07-31 RX ADMIN — BUPIVACAINE HYDROCHLORIDE 3.5 ML: 5 INJECTION, SOLUTION PERINEURAL at 09:24

## 2020-07-31 NOTE — LETTER
August 3, 2020     Jocelyn Huff DO  [facility] [address] [facility]    Patient: Earnest Pineda   YOB: 1970   Date of Visit: 7/31/2020       Dear Dr Shelly Shea: Thank you for referring Mynor Fernandez to me for evaluation  Below are my notes for this consultation  If you have questions, please do not hesitate to call me  I look forward to following your patient along with you  Sincerely,        Tammy Greer DO        CC: No Recipients  Tammy Greer DO  7/31/2020 10:47 AM  Signed  Assessment/Plan:    No problem-specific Assessment & Plan notes found for this encounter  Diagnoses and all orders for this visit:    Physical deconditioning    Chronic pain of left knee  -     Ambulatory referral to Orthopedic Surgery    Gait abnormality  -     Ambulatory referral to Orthopedic Surgery    Atrophy of muscle of left lower leg  -     Ambulatory referral to Orthopedic Surgery    Knee strain, left, initial encounter          The knee was injected with Depo-Medrol and Marcaine  He needs to start therapy to work on his strength motion and weakness  He returned back in 2 months for strength and motion check    Subjective:      Patient ID: Earnest Pineda is a 52 y o  male  HPI     The patient has persistent pain and weakness along his left knee  He denies any specific trauma  He does have a history of degenerative disc disease of his lumbar spine  He states at times he has trouble straightening out his knee  He did have an MRI performed yesterday which was a negative study  The following portions of the patient's history were reviewed and updated as appropriate: allergies, current medications, past family history, past medical history, past social history, past surgical history and problem list     Review of Systems   Constitutional: Negative for chills, fever and unexpected weight change  HENT: Negative for hearing loss, nosebleeds and sore throat      Eyes: Negative for pain, redness and visual disturbance  Respiratory: Negative for cough, shortness of breath and wheezing  Cardiovascular: Negative for chest pain, palpitations and leg swelling  Gastrointestinal: Negative for abdominal pain, nausea and vomiting  Endocrine: Negative for polydipsia and polyuria  Genitourinary: Negative for dysuria and hematuria  Musculoskeletal: Positive for arthralgias, back pain, gait problem, myalgias, neck pain and neck stiffness  Negative for joint swelling  As noted in HPI   Skin: Negative for rash and wound  Neurological: Negative for dizziness, numbness and headaches  Psychiatric/Behavioral: Negative for decreased concentration and suicidal ideas  The patient is not nervous/anxious  Objective:      /81 (BP Location: Left arm, Patient Position: Sitting, Cuff Size: Large)   Pulse 105   Temp 98 1 °F (36 7 °C) (Temporal)   Wt 116 kg (255 lb)   BMI 35 57 kg/m²          Physical Exam        Left lower extremity is neurovascular intact  Toes are pink and mobile  Compartments are soft  Range of motion of his knee is from  degrees  Passively it can be straightened out completely  There is a negative Lachman's, drawer, and pivot shift test   There is no medial lateral stability  There is medial joint tenderness  Minimal patellofemoral crepitation    X-rays displaced no fractures or dislocations    There is minimal medial side arthritis    MRI was a normal study    Large joint arthrocentesis: L knee  Date/Time: 7/31/2020 9:24 AM  Consent given by: patient  Site marked: site marked  Timeout: Immediately prior to procedure a time out was called to verify the correct patient, procedure, equipment, support staff and site/side marked as required   Supporting Documentation  Indications: pain   Procedure Details  Location: knee - L knee  Needle size: 22 G  Ultrasound guidance: no  Approach: lateral  Medications administered: 3 5 mL bupivacaine 0 5 %; 2 mL methylPREDNISolone acetate 40 mg/mL    Patient tolerance: patient tolerated the procedure well with no immediate complications  Dressing:  Sterile dressing applied

## 2020-07-31 NOTE — PROGRESS NOTES
Assessment/Plan:    No problem-specific Assessment & Plan notes found for this encounter  Diagnoses and all orders for this visit:    Physical deconditioning    Chronic pain of left knee  -     Ambulatory referral to Orthopedic Surgery    Gait abnormality  -     Ambulatory referral to Orthopedic Surgery    Atrophy of muscle of left lower leg  -     Ambulatory referral to Orthopedic Surgery    Knee strain, left, initial encounter          The knee was injected with Depo-Medrol and Marcaine  He needs to start therapy to work on his strength motion and weakness  He returned back in 2 months for strength and motion check    Subjective:      Patient ID: Eddie Conrad is a 52 y o  male  HPI     The patient has persistent pain and weakness along his left knee  He denies any specific trauma  He does have a history of degenerative disc disease of his lumbar spine  He states at times he has trouble straightening out his knee  He did have an MRI performed yesterday which was a negative study  The following portions of the patient's history were reviewed and updated as appropriate: allergies, current medications, past family history, past medical history, past social history, past surgical history and problem list     Review of Systems   Constitutional: Negative for chills, fever and unexpected weight change  HENT: Negative for hearing loss, nosebleeds and sore throat  Eyes: Negative for pain, redness and visual disturbance  Respiratory: Negative for cough, shortness of breath and wheezing  Cardiovascular: Negative for chest pain, palpitations and leg swelling  Gastrointestinal: Negative for abdominal pain, nausea and vomiting  Endocrine: Negative for polydipsia and polyuria  Genitourinary: Negative for dysuria and hematuria  Musculoskeletal: Positive for arthralgias, back pain, gait problem, myalgias, neck pain and neck stiffness  Negative for joint swelling          As noted in HPI Skin: Negative for rash and wound  Neurological: Negative for dizziness, numbness and headaches  Psychiatric/Behavioral: Negative for decreased concentration and suicidal ideas  The patient is not nervous/anxious  Objective:      /81 (BP Location: Left arm, Patient Position: Sitting, Cuff Size: Large)   Pulse 105   Temp 98 1 °F (36 7 °C) (Temporal)   Wt 116 kg (255 lb)   BMI 35 57 kg/m²          Physical Exam        Left lower extremity is neurovascular intact  Toes are pink and mobile  Compartments are soft  Range of motion of his knee is from  degrees  Passively it can be straightened out completely  There is a negative Lachman's, drawer, and pivot shift test   There is no medial lateral stability  There is medial joint tenderness  Minimal patellofemoral crepitation    X-rays displaced no fractures or dislocations    There is minimal medial side arthritis    MRI was a normal study    Large joint arthrocentesis: L knee  Date/Time: 7/31/2020 9:24 AM  Consent given by: patient  Site marked: site marked  Timeout: Immediately prior to procedure a time out was called to verify the correct patient, procedure, equipment, support staff and site/side marked as required   Supporting Documentation  Indications: pain   Procedure Details  Location: knee - L knee  Needle size: 22 G  Ultrasound guidance: no  Approach: lateral  Medications administered: 3 5 mL bupivacaine 0 5 %; 2 mL methylPREDNISolone acetate 40 mg/mL    Patient tolerance: patient tolerated the procedure well with no immediate complications  Dressing:  Sterile dressing applied

## 2020-08-03 ENCOUNTER — TELEPHONE (OUTPATIENT)
Dept: INTERNAL MEDICINE CLINIC | Facility: CLINIC | Age: 50
End: 2020-08-03

## 2020-08-03 DIAGNOSIS — I10 ESSENTIAL HYPERTENSION: Primary | ICD-10-CM

## 2020-08-03 RX ORDER — METOPROLOL SUCCINATE 50 MG/1
50 TABLET, EXTENDED RELEASE ORAL DAILY
Qty: 90 TABLET | Refills: 1 | Status: SHIPPED | OUTPATIENT
Start: 2020-08-03 | End: 2020-08-21 | Stop reason: SDUPTHER

## 2020-08-03 NOTE — TELEPHONE ENCOUNTER
Pt can't take the lisinopril because 2-3 years ago he had reaction to this medication, his tongue swelled up  What else can you order in place of this?

## 2020-08-07 ENCOUNTER — EVALUATION (OUTPATIENT)
Dept: PHYSICAL THERAPY | Facility: CLINIC | Age: 50
End: 2020-08-07
Payer: COMMERCIAL

## 2020-08-07 DIAGNOSIS — S86.912D STRAIN OF LEFT KNEE, SUBSEQUENT ENCOUNTER: ICD-10-CM

## 2020-08-07 DIAGNOSIS — R53.81 PHYSICAL DECONDITIONING: Primary | ICD-10-CM

## 2020-08-07 PROCEDURE — 97110 THERAPEUTIC EXERCISES: CPT

## 2020-08-07 PROCEDURE — 97162 PT EVAL MOD COMPLEX 30 MIN: CPT

## 2020-08-07 NOTE — PROGRESS NOTES
PT Evaluation     Today's date: 2020  Patient name: Carole Greene  : 1970  MRN: [id number]  Referring provider: Franck Ferreira DO  Dx:   Encounter Diagnosis     ICD-10-CM    1  Physical deconditioning  R53 81    2  Strain of left knee, subsequent encounter  [id number]                   Assessment  Assessment details: Carole Greene is a 52 y o  male presenting to outpatient physical therapy with diagnosis of Physical deconditioning  (primary encounter diagnosis)  Strain of left knee, subsequent encounter  Patient's current impairments include L knee pain, impaired soft tissue mobility, reduced L knee range of motion, reduced LLE strength, reduced postural awareness, and reduced activity tolerance  Patient's present functional limitations include difficulty with ADLs with increased need for assistance, reliance on medication and/or modalities for pain relief, poor tolerance for functional mobility and activity, and difficulty completing work/HH  responsibilities  Patient to benefit from skilled outpatient physical therapy 2x/week for 6-8 weeks in order to reduce pain, maximize pain free range of motion, increase strength and stability, and improve functional mobility/functional activity in order to maximize return to prior level of function with reduced limitations  Thank you for your referral     Impairments: abnormal gait, abnormal muscle tone, abnormal or restricted ROM, activity intolerance, impaired physical strength, lacks appropriate home exercise program, pain with function and weight-bearing intolerance  Barriers to therapy: Overweight, back pain  Understanding of Dx/Px/POC: good   Prognosis: good    Goals  STGs to be achieved in 4 weeks:  1  Pt to demonstrate reduced subjective pain rating "at worst" by at least 2-3 points from Initial Eval in order to allow for reduced pain with ADLs and improved functional activity tolerance     2  Pt to demonstrate increased AROM of L knee by at least 5-10 degrees in order to allow for greater ease and independence with ADLs and functional mobility  3  Pt to demonstrate full PROM of L knee in order to maximize joint mobility and function and allow for progression of exercise program and achievement of goals  4  Pt to demonstrate increased MMT of L knee  by at least 1/2-1 grade in order to improve safety and stability with ADLs and functional mobility  LTGs to be achieved in 6-8 weeks:  1  Pt will be I with HEP in order to continue to improve quality of life and independence and reduce risk for re-injury  2  Pt to demonstrate return to indep amb w/o Ads with nl gt pattern without limitations or restrictions  3  Pt to demonstrate improved function as noted by achieving or exceeding predicted score on FOTO outcomes assessment tool  Plan  Patient would benefit from: skilled physical therapy  Planned therapy interventions: home exercise program, strengthening, stretching, therapeutic exercise, manual therapy and balance  Frequency: 2x week  Duration in weeks: 6  Plan of Care beginning date: 8/7/2020  Plan of Care expiration date: 9/18/2020  Treatment plan discussed with: patient        Subjective Evaluation    History of Present Illness  Mechanism of injury: In early Feb, while pt was recovering from a severely sprained L ankle, pt was crawling in his home and heard a pop in the L knee  Pt had shot of pain up and down the leg   As time progressed he began to experience tightness in the knee and could not fully extend it  Also notes numbness above the knee and proximal 1/3 of L ant low leg  Pt saw Dr Tex Perkins 7/31 and had cortisone injection L knee  Pt notes improved mobility with inj and "can straighten the knee better  Pt is using a rw for amb since March  Pt having diff walking, straightening the knee, diff weight bearing LLE  Pt avoiding stairs, stays on first floor  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    Pt has hx of back pain, and cerv surgery  Pt had several back adjustments by chiropractor and knee pain has since decreased  Not a recurrent problem   Quality of life: poor    Pain  Current pain ratin (seated)  At best pain ratin  At worst pain rating: 3  Location: L knee  Quality: tight, dull ache and discomfort  Relieving factors: rest  Aggravating factors: walking and standing (laying flat on back or on L side)  Progression: improved    Social Support  Steps to enter house: yes (1)  Stairs in house: yes (does not use)   Lives in: multiple-level home  Lives with: adult children and parents    Employment status: not working  Hand dominance: left      Diagnostic Tests  MRI studies: normal (L knee)  Treatments  Previous treatment: chiropractic  Current treatment: injection treatment and physical therapy  Patient Goals  Patient goals for therapy: decreased pain, increased motion and increased strength  Patient goal: ret to PLOF, indep amb w/o asst devices        Objective     Observations     Additional Observation Details  Pt is obese' atrophy L quad/ham, and calf  Pt amb with antalgic gt using rw with cont gt pattern and fwd lean onto arms of walker  Tenderness   Left Knee   Tenderness in the tibial tubercle  Neurological Testing     Sensation     Knee   Left Knee   Intact: hot/cold discrimination  Diminished: light touch     Right Knee   Intact: hot/cold discrimination     Comments   Left light touch: pat tendon insertion area       Active Range of Motion   Left Knee   Flexion: 117 degrees   Extension: -15 degrees   Extensor la degrees     Right Knee   Flexion: 135 degrees   Extension: 0 degrees     Strength/Myotome Testing     Left Knee   Flexion: 4  Extension: 4    Right Knee   Flexion: 4+  Extension: 5    Swelling     Left Knee Girth Measurement (cm)   Joint line: 39 cm  10 cm above joint line: 44 5 cm  10 cm below joint line: 36 cm    Right Knee Girth Measurement (cm)   Joint line: 39 cm  10 cm above joint line: 47 cm  10 cm below joint line: 38 5 cm             Precautions: cerv surgery, back pain    Re-eval Date:visit 10    Date 8/7       Visit Count 1       FOTO completed       Pain In        Pain Out                Manuals 8/7            Ham stretch             Quad stretch                                       Neuro Re-Ed                                                                                                        Ther Ex             Nustep L3  10'            QS             SAQ             Heel slides             SLRs             SL abd L             bridges             Add squeeze             Leg press             Leg ext mach             Leg flex mach             Wt shift in // bars             Standing march                          Ther Activity                                       Gait Training                                       Modalities

## 2020-08-10 ENCOUNTER — OFFICE VISIT (OUTPATIENT)
Dept: PHYSICAL THERAPY | Facility: CLINIC | Age: 50
End: 2020-08-10
Payer: COMMERCIAL

## 2020-08-10 DIAGNOSIS — R53.81 PHYSICAL DECONDITIONING: Primary | ICD-10-CM

## 2020-08-10 DIAGNOSIS — S86.912D STRAIN OF LEFT KNEE, SUBSEQUENT ENCOUNTER: ICD-10-CM

## 2020-08-10 PROCEDURE — 97110 THERAPEUTIC EXERCISES: CPT

## 2020-08-10 NOTE — PROGRESS NOTES
Daily Note     Today's date: 8/10/2020  Patient name: Ivonne Lazar  : 1970  MRN: [id number]  Referring provider: Dewitt Kayser, DO  Dx:   Encounter Diagnosis     ICD-10-CM    1  Physical deconditioning  R53 81    2  Strain of left knee, subsequent encounter  [id number]                   Subjective: Pt states he has no pain, only tightness in the L LE      Objective: See treatment diary below      Assessment: Tolerated treatment well  Patient exhibited good technique with therapeutic exercises and would benefit from continued PT Pt arrived to PT amb with rw and mod antalgic gt  Pt states he has no pain but his leg feels tight  Tightness reduced following tx  Plan: Continue per plan of care  Attempt to have pt wean off rw       Precautions: cerv surgery, back pain    Re-eval Date:visit 10    Date 8/7 8/10      Visit Count 1 2      FOTO completed       Pain In        Pain Out                Manuals 8/7 8/10           Ham stretch  4x30"           Quad stretch                                       Neuro Re-Ed                                                                                                        Ther Ex             Nustep L3  10' [address] [facility]  L1 10'           QS  10x5"           SAQ  30x3"           Heel slides  20x           SLRs  2x15           SL abd L  2x15           bridges  20           Add squeeze  30x5"           Leg press  70#  30x           Leg ext mach  33#  30x           Leg flex mach  33#   30x           Wt shift in // bars  Fwd/lat  2'ea           Standing march  30           Step ups  20           TRs/HRs  30                                                               Ther Activity                                       Gait Training                                       Modalities             CP to L knee  10'

## 2020-08-14 ENCOUNTER — OFFICE VISIT (OUTPATIENT)
Dept: PHYSICAL THERAPY | Facility: CLINIC | Age: 50
End: 2020-08-14
Payer: COMMERCIAL

## 2020-08-14 DIAGNOSIS — R53.81 PHYSICAL DECONDITIONING: Primary | ICD-10-CM

## 2020-08-14 PROCEDURE — 97110 THERAPEUTIC EXERCISES: CPT

## 2020-08-14 NOTE — PROGRESS NOTES
Daily Note     Today's date: 2020  Patient name: Guru Record  : 1970  MRN: [id number]  Referring provider: Adriana Martines DO  Dx:   Encounter Diagnosis     ICD-10-CM    1  Physical deconditioning  R53 81        Start Time: 0900  Stop Time: 1000  Total time in clinic (min): 60 minutes    Subjective:  Pt  states he's stiff and sore all over  L Hip/Quads, and Knee are the most bothersome  Objective: See treatment diary below      Assessment: Tolerated treatment Fair to Fair+ overall with performance and tolerance to ther exer and manual stretch  Plan: Con't services 2x/week as per POC/Goals         Precautions: cerv surgery, back pain    Re-eval Date:visit 10    Date 8/7 8/10 8 14 20     Visit Count 1 2 3     FOTO completed       Pain In   0/10  stiff/sore     Pain Out   < stiff/sore             Manuals 8/7 8/10 8 14 20     Ham stretch  4x30" 4x30"     Quad stretch   **NV     Groin stretch   **NV     ITB   **4x30"     Neuro Re-Ed                                                                Ther Ex   8 14 20     Nustep L3  10' [facility]  L1 10' [facility]  L2 10'     QS  10x5" 10x5"       SAQ  30x3" 30x3"       Heel slides  20x 25x       SLRs  2x15 2x15       SL abd L  2x15 2x15         bridges  20 20x       Add squeeze  30x5" 30x5"       Leg press  70#  30x 70#  30x     Leg ext mach  33#  30x 33#  30x     Leg flex mach  33#   30x 33#   30x     Wt shift in // bars  Fwd/lat  2'ea Fwd/lat  2'ea     Standing  30x     Step ups  20 20x       TRs/HRs  30 30x                                       Ther Activity                        Gait Training                        Modalities        CP to L knee  10' 10 min

## 2020-08-17 ENCOUNTER — APPOINTMENT (OUTPATIENT)
Dept: PHYSICAL THERAPY | Facility: CLINIC | Age: 50
End: 2020-08-17
Payer: COMMERCIAL

## 2020-08-18 ENCOUNTER — TELEPHONE (OUTPATIENT)
Dept: OBGYN CLINIC | Facility: CLINIC | Age: 50
End: 2020-08-18

## 2020-08-21 ENCOUNTER — OFFICE VISIT (OUTPATIENT)
Dept: PHYSICAL THERAPY | Facility: CLINIC | Age: 50
End: 2020-08-21
Payer: COMMERCIAL

## 2020-08-21 ENCOUNTER — OFFICE VISIT (OUTPATIENT)
Dept: INTERNAL MEDICINE CLINIC | Facility: CLINIC | Age: 50
End: 2020-08-21
Payer: COMMERCIAL

## 2020-08-21 VITALS
HEART RATE: 98 BPM | DIASTOLIC BLOOD PRESSURE: 84 MMHG | SYSTOLIC BLOOD PRESSURE: 126 MMHG | OXYGEN SATURATION: 97 % | BODY MASS INDEX: 35.84 KG/M2 | HEIGHT: 71 IN | TEMPERATURE: 98.3 F | WEIGHT: 256 LBS

## 2020-08-21 DIAGNOSIS — R53.81 PHYSICAL DECONDITIONING: Primary | ICD-10-CM

## 2020-08-21 DIAGNOSIS — Z78.9 DRUG INTOLERANCE: ICD-10-CM

## 2020-08-21 DIAGNOSIS — E11.9 TYPE 2 DIABETES MELLITUS WITHOUT COMPLICATION, WITHOUT LONG-TERM CURRENT USE OF INSULIN (HCC): Primary | ICD-10-CM

## 2020-08-21 DIAGNOSIS — I10 ESSENTIAL HYPERTENSION: ICD-10-CM

## 2020-08-21 PROCEDURE — 97110 THERAPEUTIC EXERCISES: CPT

## 2020-08-21 PROCEDURE — 3008F BODY MASS INDEX DOCD: CPT | Performed by: INTERNAL MEDICINE

## 2020-08-21 PROCEDURE — 1036F TOBACCO NON-USER: CPT | Performed by: INTERNAL MEDICINE

## 2020-08-21 PROCEDURE — 3074F SYST BP LT 130 MM HG: CPT | Performed by: INTERNAL MEDICINE

## 2020-08-21 PROCEDURE — 3079F DIAST BP 80-89 MM HG: CPT | Performed by: INTERNAL MEDICINE

## 2020-08-21 PROCEDURE — 3046F HEMOGLOBIN A1C LEVEL >9.0%: CPT | Performed by: INTERNAL MEDICINE

## 2020-08-21 PROCEDURE — 99214 OFFICE O/P EST MOD 30 MIN: CPT | Performed by: INTERNAL MEDICINE

## 2020-08-21 RX ORDER — METOPROLOL SUCCINATE 100 MG/1
50 TABLET, EXTENDED RELEASE ORAL DAILY
Qty: 90 TABLET | Refills: 1 | Status: SHIPPED | OUTPATIENT
Start: 2020-08-21 | End: 2021-07-21

## 2020-08-21 RX ORDER — GLIMEPIRIDE 2 MG/1
TABLET ORAL
Qty: 90 TABLET | Refills: 3 | Status: SHIPPED | OUTPATIENT
Start: 2020-08-21 | End: 2020-09-10 | Stop reason: SDUPTHER

## 2020-08-21 NOTE — PATIENT INSTRUCTIONS
Type 2 Diabetes in Adults   WHAT YOU NEED TO KNOW:   What is type 2 diabetes? Type 2 diabetes is a disease that affects how your body uses glucose (sugar)  Normally, when the blood sugar level increases, the pancreas makes more insulin  Insulin helps move sugar out of the blood so it can be used for energy  Type 2 diabetes develops because either the body cannot make enough insulin, or it cannot use the insulin correctly  After many years, your pancreas may stop making insulin  What increases my risk for type 2 diabetes? · Obesity    · Physical inactivity    · Older age    · High blood pressure or high cholesterol    · A history of heart disease, gestational diabetes, or polycystic ovary syndrome     · A family member with diabetes    · Being [place] American, , , [place], or [facility]  What are the signs and symptoms of type 2 diabetes? You may have high blood sugar levels for a long time before symptoms appear  You may have any of the following:  · More hunger or thirst than usual     · Frequent urination     · Weight loss without trying     · Blurred vision  How is type 2 diabetes diagnosed? You may need tests to check for type 2 diabetes starting at age 39  You may need any of the following:  · An A1c test  shows the average amount of sugar in your blood over the past 2 to 3 months  Your healthcare provider will tell you the A1c level that is right for you  The goal for your A1c is usually below 7%  Your provider can help you make changes if a check shows the A1c is too high  · A fasting plasma glucose test  is when your blood sugar level is tested after you have not eaten for 8 hours  · A 2-hour plasma glucose test  starts with a blood sugar level check after you have not eaten for 8 hours  You are then given a glucose drink  Your blood sugar level is checked after 2 hours       · A random glucose test  may be done any time of day, no matter how long ago you ate   How is type 2 diabetes treated? Type 2 diabetes can be controlled to prevent damage to your heart, blood vessels, and other organs  The goal is to keep your blood sugar at a normal level  You must eat the right foods, and exercise regularly  You may need 1 or more hypoglycemic medicines or insulin if you cannot control your blood sugar level with nutrition and exercise  You may also need medicine to lower your risk for heart disease  An example includes medicine to lower or control your cholesterol  How do I check my blood sugar level? You will be taught how to check a small drop of blood in a glucose monitor  You will need to check your blood sugar level at least 3 times each day if you are on insulin  Ask your healthcare provider when and how often to check during the day  If you check your blood sugar level before a meal , it should be between 80 and 130 mg/dL  If you check your blood sugar level 1 to 2 hours after a meal , it should be less than 180 mg/dL  Ask your healthcare provider if these are good goals for you  Write down your results, and show them to your healthcare provider  Your provider may use the results to make changes to your medicine, food, and exercise schedules  What should I do if my blood sugar level is too low? Your blood sugar level is too low if it goes below 70 mg/dL  If the level is too low, eat or drink 15 grams of fast-acting carbohydrate  These are found naturally in fruits  Fast-acting carbohydrates will raise your blood sugar level quickly  Examples of 15 grams of fast-acting carbohydrate are 4 ounces (½ cup) of fruit juice or 4 ounces of regular soda  Other examples are 2 tablespoons of raisins or 3 to 4 glucose tablets  Check your blood sugar level 15 minutes later  If the level is still low (less than 100 mg/dL), eat another 15 grams of carbohydrate  When the level returns to 100 mg/dL, eat a snack or meal that contains carbohydrates   This will help prevent another drop in blood sugar  Always carefully follow your healthcare provider's instructions on how to treat low blood sugar levels  What do I need to know about nutrition? A dietitian will help you make a meal plan to keep your blood sugar level steady  Do not skip meals  Your blood sugar level may drop too low if you have taken diabetes medicine and do not eat  · Keep track of carbohydrates (sugar and starchy foods)  Your blood sugar level can get too high if you eat too many carbohydrates  Eat fruits, legumes, vegetables, and whole grains  Your dietitian will help you plan meals and snacks that have the right amount of carbohydrates  · Eat low-fat foods , such as skinless chicken and low-fat milk  · Eat less sodium (salt)  Limit high-sodium foods, such as soy sauce, potato chips, and soup  Do not add salt to food you cook  Limit your use of table salt  You should have less than 2,300 mg of sodium per day  · Eat high-fiber foods , such as vegetables, whole-grain breads, and beans  · Limit alcohol  Alcohol affects your blood sugar level and can make it harder to manage your diabetes  Limit alcohol to 1 drink a day if you are a woman  Limit alcohol to 2 drinks a day if you are a man  A drink of alcohol is 12 ounces of beer, 5 ounces of wine, or 1½ ounces of liquor  How much exercise do I need? Exercise can help keep your blood sugar level steady, decrease your risk of heart disease, and help you lose weight  Stretch before and after you exercise  Exercise for at least 150 minutes every week  Spread this amount of exercise over at least 3 days a week  Do not skip exercise more than 2 days in a row  Include muscle strengthening activities 2 to 3 days each week  Older adults should include balance training 2 to 3 times each week  Activities that help increase balance include yoga and stewart chi  Work with your healthcare provider to create an exercise plan    · Check your blood sugar level before and after exercise  Healthcare providers may tell you to change the amount of insulin you take or food you eat  If your blood sugar level is high, check your blood or urine for ketones before you exercise  Do not exercise if your blood sugar level is high and you have ketones  · If your blood sugar level is less than 100 mg/dL, have a carbohydrate snack before you exercise  Examples are 4 to 6 crackers, ½ banana, 8 ounces (1 cup) of milk, or 4 ounces (½ cup) of juice  Drink water or liquids that do not contain sugar before, during, and after exercise  Ask your dietitian or healthcare provider which liquids you should drink when you exercise  · Do not sit for longer than 30 minutes  If you cannot walk around, at least stand up  This will help you stay active and keep your blood circulating  What else can I do to manage type 2 diabetes? · Check your feet each day for sores  Wear shoes and socks that fit correctly  Do not trim your toenails  Ask your healthcare provider for more information about foot care  · Maintain a healthy weight  Ask your healthcare provider how much you should weigh  A healthy weight can help you control your diabetes and prevent heart disease  Ask your provider to help you create a weight loss plan if you are overweight  Together you can set manageable weight loss goals  · Do not smoke  Nicotine and other chemicals in cigarettes and cigars can cause lung damage and make it more difficult to manage your diabetes  Ask your healthcare provider for information if you currently smoke and need help to quit  Do not use e-cigarettes or smokeless tobacco in place of cigarettes or to help you quit  They still contain nicotine  · Check your blood pressure as directed  Ask your healthcare provider what your blood pressure should be  Most adults with diabetes and high blood pressure should have a systolic blood pressure (first number) less than 140   Your diastolic blood pressure (second number) should be less than 90  · Wear medical alert identification  Wear medical alert jewelry or carry a card that says you have diabetes  Ask your healthcare provider where to get these items  · Ask about vaccines  You have a higher risk for serious illness if you get the flu, pneumonia, or hepatitis  Ask your healthcare provider if you should get a flu, pneumonia, or hepatitis B vaccine, and when to get the vaccine  What are the risks of type 2 diabetes? Uncontrolled diabetes can damage your nerves, veins, and arteries  High blood sugar levels may damage other body tissue and organs over time  Damage to arteries may increase your risk for heart attack and stroke  Nerve damage may also lead to other heart, stomach, and nerve problems  Diabetes is life-threatening if it is not controlled  Control your blood glucose levels to prevent health problems  Call 911 for any of the following:   · You have any of the following signs of a stroke:      ¨ Numbness or drooping on one side of your face     ¨ Weakness in an arm or leg    ¨ Confusion or difficulty speaking    ¨ Dizziness, a severe headache, or vision loss    · You have any of the following signs of a heart attack:      ¨ Squeezing, pressure, or pain in your chest that lasts longer than 5 minutes or returns    ¨ Discomfort or pain in your back, neck, jaw, stomach, or arm     ¨ Trouble breathing    ¨ Nausea or vomiting    ¨ Lightheadedness or a sudden cold sweat, especially with chest pain or trouble breathing  When should I seek immediate care? · You have severe abdominal pain, or the pain spreads to your back  You may also be vomiting  · You have trouble staying awake or focusing  · You are shaking or sweating  · You have blurred or double vision  · Your breath has a fruity, sweet smell  · Your breathing is deep and labored, or rapid and shallow  · Your heartbeat is fast and weak    When should I contact my healthcare provider? · You are vomiting or have diarrhea  · You have an upset stomach and cannot eat the foods on your meal plan  · You feel weak or more tired than usual      · You feel dizzy, have headaches, or are easily irritated  · Your skin is red, warm, dry, or swollen  · You have a wound that does not heal      · You have numbness in your arms or legs  · You have trouble coping with your illness, or you feel anxious or depressed  · You have questions or concerns about your condition or care  CARE AGREEMENT:   You have the right to help plan your care  Learn about your health condition and how it may be treated  Discuss treatment options with your caregivers to decide what care you want to receive  You always have the right to refuse treatment  The above information is an  only  It is not intended as medical advice for individual conditions or treatments  Talk to your doctor, nurse or pharmacist before following any medical regimen to see if it is safe and effective for you  © 2017 [employer] [employer] Information is for End User's use only and may not be sold, redistributed or otherwise used for commercial purposes  All illustrations and images included in CareNotes® are the copyrighted property of A [employer]  or [employer] Castellanos

## 2020-08-21 NOTE — PROGRESS NOTES
Assessment/Plan:  Problem List Items Addressed This Visit        Endocrine    Type 2 diabetes mellitus without complication ([facility] [address] ) - Primary    Relevant Medications    glimepiride (AMARYL) 2 mg tablet       Cardiovascular and Mediastinum    Essential hypertension    Relevant Medications    metoprolol succinate (TOPROL-XL) 100 mg 24 hr tablet    glimepiride (AMARYL) 2 mg tablet      Other Visit Diagnoses     Drug intolerance        Relevant Medications    glimepiride (AMARYL) 2 mg tablet           Diagnoses and all orders for this visit:    Type 2 diabetes mellitus without complication, without long-term current use of insulin (HCC)  -     glimepiride (AMARYL) 2 mg tablet; One po q day x 5 and if sugars still >150 increase to two po q day  Drug intolerance  -     glimepiride (AMARYL) 2 mg tablet; One po q day x 5 and if sugars still >150 increase to two po q day  Essential hypertension  -     metoprolol succinate (TOPROL-XL) 100 mg 24 hr tablet; Take 0 5 tablets (50 mg total) by mouth daily  -     glimepiride (AMARYL) 2 mg tablet; One po q day x 5 and if sugars still >150 increase to two po q day  No problem-specific Assessment & Plan notes found for this encounter  A/P: Will stop the metformin for now and may be able to re-try low dose in the future  For now, will start amaryl and will increase the metoprolol  RTC as scheduled in three weeks for f/u  Subjective:      Patient ID: Cleveland Lopez is a 52 y o  male  Diabetic WM recently started on metformin for a HgA1c of 10 9 presents c/o moderate s/s since starting the med  Notes headaches, diarrhea, nausea, blurred vision, and stomach aches  No COVID exposure  Never on this med before  No fever or chills  BP remains up  No associated CP, SOB, palpitations, or slurred speech, facial droop, etc  Sugars were still running around 180         The following portions of the patient's history were reviewed and updated as appropriate:   He has a past medical history of Diabetes mellitus ([facility] [address] ), Hypertension, and Kidney stones  ,  does not have any pertinent problems on file  ,   has a past surgical history that includes Neck surgery and Cholecystectomy  ,  family history includes Breast cancer in his mother; Diabetes in his father, mother, and sister; Fibromyalgia in his sister; Hypertension in his mother  ,   reports that he has never smoked  He has never used smokeless tobacco  He reports that he does not drink alcohol or use drugs  ,  is allergic to codeine; lisinopril; metformin; and penicillins     Current Outpatient Medications   Medication Sig Dispense Refill    Ascorbic Acid (VITAMIN C) 1000 MG tablet Take 1,000 mg by mouth daily      Cholecalciferol (VITAMIN D3) 725398 UNIT/GM POWD by Does not apply route      metoprolol succinate (TOPROL-XL) 100 mg 24 hr tablet Take 0 5 tablets (50 mg total) by mouth daily 90 tablet 1    atorvastatin (LIPITOR) 20 mg tablet Take 1 tablet (20 mg total) by mouth daily (Patient not taking: Reported on 8/21/2020) 90 tablet 3    Stacey Microlet Lancets lancets Use as instructed 100 each 1    Blood Glucose Monitoring Suppl ([employer] CONTOUR MONITOR) TRACEY by Does not apply route daily 1 Device 0    glimepiride (AMARYL) 2 mg tablet One po q day x 5 and if sugars still >150 increase to two po q day  90 tablet 3    glucose blood test strip Use as instructed 100 each 1     No current facility-administered medications for this visit  Review of Systems   Constitutional: Positive for activity change and appetite change  Negative for chills, diaphoresis, fatigue and fever  Eyes: Positive for visual disturbance  Respiratory: Positive for chest tightness  Negative for cough, shortness of breath and wheezing  Cardiovascular: Negative for chest pain, palpitations and leg swelling  Gastrointestinal: Positive for abdominal pain, diarrhea and nausea  Negative for blood in stool, constipation, rectal pain and vomiting  Genitourinary: Negative for difficulty urinating, dysuria and frequency  Musculoskeletal: Negative for arthralgias, gait problem and myalgias  Neurological: Positive for headaches  Negative for light-headedness  Psychiatric/Behavioral: Negative for confusion  The patient is not nervous/anxious  PHQ-9 Depression Screening    PHQ-9:    Frequency of the following problems over the past two weeks:             Objective:  Vitals:    08/21/20 1106   BP: 126/84   Pulse: 98   Temp: 98 3 °F (36 8 °C)   SpO2: 97%   Weight: 116 kg (256 lb)   Height: 5' 11" (1 803 m)     Body mass index is 35 7 kg/m²  Physical Exam  Vitals signs and nursing note reviewed  Constitutional:       General: He is not in acute distress  Appearance: Normal appearance  HENT:      Head: Normocephalic and atraumatic  Mouth/Throat:      Mouth: Mucous membranes are moist    Eyes:      Extraocular Movements: Extraocular movements intact  Conjunctiva/sclera: Conjunctivae normal       Pupils: Pupils are equal, round, and reactive to light  Neck:      Musculoskeletal: Neck supple  Cardiovascular:      Rate and Rhythm: Normal rate and regular rhythm  Pulmonary:      Effort: Pulmonary effort is normal  No respiratory distress  Breath sounds: Normal breath sounds  No wheezing or rales  Abdominal:      General: Bowel sounds are normal  There is no distension  Palpations: Abdomen is soft  Tenderness: There is no abdominal tenderness  There is no guarding or rebound  Lymphadenopathy:      Cervical: No cervical adenopathy  Neurological:      General: No focal deficit present  Mental Status: He is alert and oriented to person, place, and time  Mental status is at baseline  Psychiatric:         Mood and Affect: Mood normal          Behavior: Behavior normal          Thought Content:  Thought content normal          Judgment: Judgment normal

## 2020-08-21 NOTE — PROGRESS NOTES
Daily Note     Today's date: 2020  Patient name: Micha Berg  : 1970  MRN: [id number]  Referring provider: Jacobo Aguillon DO  Dx:   Encounter Diagnosis     ICD-10-CM    1  Physical deconditioning  R53 81                   Subjective: Pt  States his Low Back and L Quad regions are stiff and sore  Objective: See treatment diary below  *Manual stretch held today due to time constraint and conflict with another medical appt  that pt has scheduled  Assessment: Tolerated treatment Fair+ To Fairly Well overall with performance of all ther exer  Received MHP for LB and L Quad well for decrease in stiffness and soreness  Plan: Con't services 2x/week          Precautions: cerv surgery, back pain    Re-eval Date:visit 10    Date 8/7 8/10 8 14 20 8 21 20    Visit Count 1 2 3 4    FOTO completed       Pain In   010  stiff/sore 3/10  "stiff/sore" @ LB/L Quad    Pain Out   < stiff/sore Sx < post treatment session, and MHP applic            Manuals 8/7 8/10 8 14 20 8 21 20    Ham stretch  4x30" 4x30" Resume NV    Quad stretch   **NV     Groin stretch   **NV     ITB   **4x30" Resume NV    Neuro Re-Ed                                                                Ther Ex   8 14 20 8 21 20    Nustep L3  10' [facility]  L1 10' [facility]  L2 10' NuStep L2 x 10 min  *MHP to LB and L Quad    QS  10x5" 10x5"   15x5"    SAQ  30x3" 30x3"   40x3"    Heel slides  20x 25x   30x    SLRs  2x15 2x15   2x15    SL abd L  2x15 2x15     2x15    bridges  20 20x   20x    Add squeeze  30x5" 30x5"   40x5"    Leg press  70#  30x 70#  30x 70#  40x    Leg ext mach  33#  30x 33#  30x 33#  40x    Leg flex mach  33#   30x 33#   30x 33#  40x    Wt shift in // bars  Fwd/lat  2'ea Fwd/lat  2'ea resume    Standing  30x 40x    Step ups  20 20x   resume    TRs/HRs  30 30x   40x                                    Ther Activity                        Gait Training                        Modalities    8 21 20    CP to L knee  10' 10 min Will apply @ home today

## 2020-08-24 ENCOUNTER — OFFICE VISIT (OUTPATIENT)
Dept: PHYSICAL THERAPY | Facility: CLINIC | Age: 50
End: 2020-08-24
Payer: COMMERCIAL

## 2020-08-24 DIAGNOSIS — R53.81 PHYSICAL DECONDITIONING: Primary | ICD-10-CM

## 2020-08-24 PROCEDURE — 97110 THERAPEUTIC EXERCISES: CPT

## 2020-08-24 PROCEDURE — 97140 MANUAL THERAPY 1/> REGIONS: CPT

## 2020-08-24 NOTE — PROGRESS NOTES
Daily Note     Today's date: 2020  Patient name: Boo Parmar  : 1970  MRN: [id number]  Referring provider: Ammon Vázquez DO  Dx:   Encounter Diagnosis     ICD-10-CM    1  Physical deconditioning  R53 81                   Subjective: Pt  reports same sx of stiffness/soreness @ LB and L Quad region upon coming in for treatment today  Objective: See treatment diary below      Assessment: Tolerated treatment Fairly Well overall with performance of ther exer  ,  and tolerance to Manual stretch  Less stiff and sore after treatment session  Plan: Con't services 2x/week           Precautions: cerv surgery, back pain    Re-eval Date:visit 10    Date 8/7 8/10 8 14 20 8 21 20 8 24 20   Visit Count 1 2 3 4 5   FOTO completed       Pain In   0/10  stiff/sore 3/10  "stiff/sore" @ LB/L Quad "stiff/sore" @ LB/L Quad   Pain Out   < stiff/sore Sx < post treatment session, and MHP applic Less stiff/sore after treatment session           Manuals 8/7 8/10 8 14 20 8 21 20 8 24 20   Ham stretch  4x30" 4x30" Resume NV 4x30"   Quad stretch   **NV  **4x30"   Groin stretch   **NV  **4x30"   ITB   **4x30" Resume NV 4x30"   Neuro Re-Ed                                                                Ther Ex   8 14 20 8 21 20 8 24 20   Nustep L3  10' [facility]  L1 10' [facility]  L2 10' NuStep L2 x 10 min  *MHP to LB and L Quad NuStep L2 x 10 min  *MHP to LB and L Quad   QS  10x5" 10x5"   15x5" *Reviewed for   HEP   SAQ  30x3" 30x3"   40x3" *Reviewed for  HEP   Heel slides  20x 25x   30x *Reviewed for  HEP   SLRs  2x15 2x15   2x15 3x15   SL abd L  2x15 2x15     2x15 3x15   bridges  20 20x   20x 25x   Add squeeze  30x5" 30x5"   40x5" 40x5"   Leg press  70#  30x 70#  30x 70#  40x 70#  40x   Leg ext mach  33#  30x 33#  30x 33#  40x 33#  40x   Leg flex mach  33#   30x 33#   30x 33#  40x 33#  40x   Wt shift in // bars  Fwd/lat  2'ea Fwd/lat  2'ea resume Fwd/lat  2'ea   Standing march  30 30x 40x 40x   Step ups  20 20x   resume 25x TRs/HRs  30 30x   40x 40x                                   Ther Activity                        Gait Training                        Modalities    8 21 20 8 24 20   CP to L knee  10' 10 min Will apply @ home today Will apply @ home today

## 2020-08-28 ENCOUNTER — APPOINTMENT (OUTPATIENT)
Dept: PHYSICAL THERAPY | Facility: CLINIC | Age: 50
End: 2020-08-28
Payer: COMMERCIAL

## 2020-08-31 ENCOUNTER — APPOINTMENT (OUTPATIENT)
Dept: PHYSICAL THERAPY | Facility: CLINIC | Age: 50
End: 2020-08-31
Payer: COMMERCIAL

## 2020-09-04 ENCOUNTER — OFFICE VISIT (OUTPATIENT)
Dept: PHYSICAL THERAPY | Facility: CLINIC | Age: 50
End: 2020-09-04
Payer: COMMERCIAL

## 2020-09-04 DIAGNOSIS — R53.81 PHYSICAL DECONDITIONING: Primary | ICD-10-CM

## 2020-09-04 DIAGNOSIS — S86.912D STRAIN OF LEFT KNEE, SUBSEQUENT ENCOUNTER: ICD-10-CM

## 2020-09-04 PROCEDURE — 97140 MANUAL THERAPY 1/> REGIONS: CPT

## 2020-09-04 PROCEDURE — 97110 THERAPEUTIC EXERCISES: CPT

## 2020-09-04 NOTE — PROGRESS NOTES
Daily Note     Today's date: 2020  Patient name: Yves Ho  : 1970  MRN: [id number]  Referring provider: Darell Jamil DO  Dx:   Encounter Diagnosis     ICD-10-CM    1  Physical deconditioning  R53 81    2  Strain of left knee, subsequent encounter  [id number]                   Subjective: Pt states he has stiffness in his back which causes stiffness in the L leg including the knee  Objective: See treatment diary below      Assessment: Tolerated treatment well  Patient exhibited good technique with therapeutic exercises and would benefit from continued PTPt remains limited in L knee ext and amb with limp due to same  Pt arrived to PT amb with mod limp on L  Pt holding L thigh as he walks  Pt has same gait after PT session with no change in stiffness  Plan: Continue per plan of care        Precautions: cerv surgery, back pain    Re-eval Date:visit 10    Date        Visit Count 6       FOTO NV       Pain In 2-3/10 stiffness       Pain Out                Manuals     8 24 20   Ham stretch 4 x 30"    4x30"   Quad stretch 4 x 30"    **4x30"   Groin stretch     **4x30"   pirif stretch 4 x 30"       ITB 4 x 30"    4x30"   Neuro Re-Ed                                                                Ther Ex        Nustep L2   10'       QS HEP       SAQ HEP       Heel slides HEP       SLRs  B 2#  30x       SL abd L 2#  30x       bridges 30 x 5"       Add squeeze 40x 5"       Leg press 80 #  30x       Leg ext mach 33#  30x       Leg flex mach 33#  30x       Wt shift in // bars DC       Standing  ea       Step ups 25x  ea        /fwd/lat       TRs/HRs 40x                                       Ther Activity                        Gait Training                        Modalities    8 21 20 8 24 20   CP to L knee  10' 10 min Will apply @ home today Will apply @ home today

## 2020-09-10 ENCOUNTER — OFFICE VISIT (OUTPATIENT)
Dept: INTERNAL MEDICINE CLINIC | Facility: CLINIC | Age: 50
End: 2020-09-10
Payer: COMMERCIAL

## 2020-09-10 VITALS
SYSTOLIC BLOOD PRESSURE: 124 MMHG | TEMPERATURE: 97.4 F | BODY MASS INDEX: 36.96 KG/M2 | HEIGHT: 71 IN | OXYGEN SATURATION: 97 % | HEART RATE: 97 BPM | WEIGHT: 264 LBS | DIASTOLIC BLOOD PRESSURE: 80 MMHG

## 2020-09-10 DIAGNOSIS — Z23 ENCOUNTER FOR VACCINATION: ICD-10-CM

## 2020-09-10 DIAGNOSIS — E11.9 TYPE 2 DIABETES MELLITUS WITHOUT COMPLICATION, WITHOUT LONG-TERM CURRENT USE OF INSULIN (HCC): Primary | ICD-10-CM

## 2020-09-10 DIAGNOSIS — Z78.9 DRUG INTOLERANCE: ICD-10-CM

## 2020-09-10 DIAGNOSIS — I10 ESSENTIAL HYPERTENSION: ICD-10-CM

## 2020-09-10 PROCEDURE — 99214 OFFICE O/P EST MOD 30 MIN: CPT | Performed by: INTERNAL MEDICINE

## 2020-09-10 PROCEDURE — 90686 IIV4 VACC NO PRSV 0.5 ML IM: CPT | Performed by: INTERNAL MEDICINE

## 2020-09-10 PROCEDURE — 90471 IMMUNIZATION ADMIN: CPT | Performed by: INTERNAL MEDICINE

## 2020-09-10 PROCEDURE — 3725F SCREEN DEPRESSION PERFORMED: CPT | Performed by: INTERNAL MEDICINE

## 2020-09-10 RX ORDER — GLIMEPIRIDE 4 MG/1
TABLET ORAL
Qty: 90 TABLET | Refills: 1
Start: 2020-09-10 | End: 2020-10-09 | Stop reason: SDUPTHER

## 2020-09-10 NOTE — PROGRESS NOTES
Assessment/Plan:  Problem List Items Addressed This Visit        Endocrine    Type 2 diabetes mellitus without complication ([facility] [address] ) - Primary    Relevant Medications    glimepiride (AMARYL) 4 mg tablet       Cardiovascular and Mediastinum    Essential hypertension    Relevant Medications    glimepiride (AMARYL) 4 mg tablet      Other Visit Diagnoses     Encounter for vaccination        Relevant Orders    influenza vaccine, quadrivalent, 0 5 mL, preservative-free    Drug intolerance        Relevant Medications    glimepiride (AMARYL) 4 mg tablet           Diagnoses and all orders for this visit:    Type 2 diabetes mellitus without complication, without long-term current use of insulin (HCC)  -     glimepiride (AMARYL) 4 mg tablet; One po q day x 5 and if sugars still >150 increase to two po q day  Essential hypertension  -     glimepiride (AMARYL) 4 mg tablet; One po q day x 5 and if sugars still >150 increase to two po q day  Encounter for vaccination  -     influenza vaccine, quadrivalent, 0 5 mL, preservative-free    Drug intolerance  -     glimepiride (AMARYL) 4 mg tablet; One po q day x 5 and if sugars still >150 increase to two po q day  No problem-specific Assessment & Plan notes found for this encounter  A/P: Doing better and tolerating the meds  Will hold on amaryl adjustment as he has not reached full effect yet  Will update his flu shot  Continue current treatment otherwise  RTC two months for routine and consider adding an ARB low dose  Subjective:      Patient ID: De Rogers is a 52 y o  male  WM RTC for f/u uncontrolled dm and Htn  Pt unable to tolerate metformin due to GI issues and switched to amaryl  Started at 2mg q day and instructed to increase to 4mg if needed  Now on 4mg and reports sugars are 120-140 and tolerating the med  BP's are better as well  Due for a flu shot  No new issues         The following portions of the patient's history were reviewed and updated as appropriate:   He has a past medical history of Diabetes mellitus ([address] ), Hypertension, and Kidney stones  ,  does not have any pertinent problems on file  ,   has a past surgical history that includes Neck surgery and Cholecystectomy  ,  family history includes Breast cancer in his mother; Diabetes in his father, mother, and sister; Fibromyalgia in his sister; Hypertension in his mother  ,   reports that he has never smoked  He has never used smokeless tobacco  He reports that he does not drink alcohol or use drugs  ,  is allergic to codeine; lisinopril; metformin; and penicillins     Current Outpatient Medications   Medication Sig Dispense Refill    Ascorbic Acid (VITAMIN C) 1000 MG tablet Take 1,000 mg by mouth daily      atorvastatin (LIPITOR) 20 mg tablet Take 1 tablet (20 mg total) by mouth daily 90 tablet 3    Cholecalciferol (VITAMIN D3) 704311 UNIT/GM POWD by Does not apply route      glimepiride (AMARYL) 4 mg tablet One po q day x 5 and if sugars still >150 increase to two po q day  90 tablet 1    metoprolol succinate (TOPROL-XL) 100 mg 24 hr tablet Take 0 5 tablets (50 mg total) by mouth daily 90 tablet 1    Stacey Microlet Lancets lancets Use as instructed 100 each 1    Blood Glucose Monitoring Suppl ([employer] CONTOUR MONITOR) TRACEY by Does not apply route daily 1 Device 0    glucose blood test strip Use as instructed 100 each 1     No current facility-administered medications for this visit  Review of Systems   Constitutional: Negative for activity change, chills, diaphoresis, fatigue and fever  Respiratory: Negative for cough, chest tightness, shortness of breath and wheezing  Cardiovascular: Negative for chest pain, palpitations and leg swelling  Gastrointestinal: Negative for abdominal pain, constipation, diarrhea, nausea and vomiting  Genitourinary: Negative for difficulty urinating, dysuria and frequency  Musculoskeletal: Negative for arthralgias, gait problem and myalgias  Neurological: Negative for dizziness, seizures, syncope, weakness, light-headedness and headaches  Psychiatric/Behavioral: Negative for confusion  The patient is not nervous/anxious  PHQ-9 Depression Screening    PHQ-9:    Frequency of the following problems over the past two weeks:       Little interest or pleasure in doing things:  0 - not at all  Feeling down, depressed, or hopeless:  0 - not at all  PHQ-2 Score:  0        Objective:  Vitals:    09/10/20 1011   BP: 124/80   Pulse: 97   Temp: (!) 97 4 °F (36 3 °C)   SpO2: 97%   Weight: 120 kg (264 lb)   Height: 5' 11" (1 803 m)     Body mass index is 36 82 kg/m²  Physical Exam  Vitals signs and nursing note reviewed  Constitutional:       General: He is not in acute distress  Appearance: Normal appearance  HENT:      Head: Normocephalic and atraumatic  Mouth/Throat:      Mouth: Mucous membranes are moist    Eyes:      Extraocular Movements: Extraocular movements intact  Conjunctiva/sclera: Conjunctivae normal       Pupils: Pupils are equal, round, and reactive to light  Cardiovascular:      Rate and Rhythm: Normal rate and regular rhythm  Heart sounds: Normal heart sounds  Pulmonary:      Effort: Pulmonary effort is normal  No respiratory distress  Breath sounds: Normal breath sounds  No wheezing or rales  Musculoskeletal:      Right lower leg: No edema  Left lower leg: No edema  Neurological:      General: No focal deficit present  Mental Status: He is alert and oriented to person, place, and time  Mental status is at baseline  Psychiatric:         Mood and Affect: Mood normal          Behavior: Behavior normal          Thought Content:  Thought content normal          Judgment: Judgment normal

## 2020-09-10 NOTE — PATIENT INSTRUCTIONS
Type 2 Diabetes in Adults   AMBULATORY CARE:   Type 2 diabetes  is a disease that affects how your body uses glucose (sugar)  Normally, when the blood sugar level increases, the pancreas makes more insulin  Insulin helps move sugar out of the blood so it can be used for energy  Type 2 diabetes develops because either the body cannot make enough insulin, or it cannot use the insulin correctly  After many years, your pancreas may stop making insulin  Common symptoms include the following:   · More hunger or thirst than usual     · Frequent urination     · Weight loss without trying     · Blurred vision  Call 911 if you have any of the following:   · You have any of the following signs of a stroke:      ¨ Numbness or drooping on one side of your face     ¨ Weakness in an arm or leg    ¨ Confusion or difficulty speaking    ¨ Dizziness, a severe headache, or vision loss    · You have any of the following signs of a heart attack:      ¨ Squeezing, pressure, or pain in your chest that lasts longer than 5 minutes or returns    ¨ Discomfort or pain in your back, neck, jaw, stomach, or arm     ¨ Trouble breathing    ¨ Nausea or vomiting    ¨ Lightheadedness or a sudden cold sweat, especially with chest pain or trouble breathing  Seek care immediately if:   · You have severe abdominal pain, or the pain spreads to your back  You may also be vomiting  · You have trouble staying awake or focusing  · You are shaking or sweating  · You have blurred or double vision  · Your breath has a fruity, sweet smell  · Your breathing is deep and labored, or rapid and shallow  · Your heartbeat is fast and weak  Contact your healthcare provider if:   · You are vomiting or have diarrhea  · You have an upset stomach and cannot eat the foods on your meal plan  · You feel weak or more tired than usual      · You feel dizzy, have headaches, or are easily irritated  · Your skin is red, warm, dry, or swollen  · You have a wound that does not heal      · You have numbness in your arms or legs  · You have trouble coping with your illness, or you feel anxious or depressed  · You have questions or concerns about your condition or care  Treatment for type 2 diabetes  includes keeping your blood sugar at a normal level  You must eat the right foods, and exercise regularly  You may need medicine if you cannot control your blood sugar level with nutrition and exercise  You may also need medicine to prevent heart disease, a complication of type 2 diabetes  You may  need any of the following:  · Hypoglycemic medicines or insulin  may be given to decrease the amount of sugar in your blood  · Blood pressure medicine  may be given to lower your blood pressure  Your blood pressure should be less than 140/90  · Cholesterol lowering medicine  may be given to prevent heart disease  · Antiplatelets , such as aspirin, help prevent blood clots  Take your antiplatelet medicine exactly as directed  These medicines make it more likely for you to bleed or bruise  If you are told to take aspirin, do not take acetaminophen or ibuprofen instead  · Take your medicine as directed  Contact your healthcare provider if you think your medicine is not helping or if you have side effects  Tell him or her if you are allergic to any medicine  Keep a list of the medicines, vitamins, and herbs you take  Include the amounts, and when and why you take them  Bring the list or the pill bottles to follow-up visits  Carry your medicine list with you in case of an emergency  Check your blood sugar level: You will be taught how to check a small drop of blood in a glucose monitor  You will need to check your blood sugar level at least 3 times each day if you are on insulin  Ask your healthcare provider when and how often to check during the day  If you check your blood sugar level before a meal , it should be between 80 and 130 mg/dL   If you check your blood sugar level 1 to 2 hours after a meal , it should be less than 180 mg/dL  Ask your healthcare provider if these are good goals for you  Write down your results, and show them to your healthcare provider  He may use the results to make changes to your medicine, food, and exercise schedules  If your blood sugar level is too low: Your blood sugar level is too low if it goes below 70 mg/dL  If the level is too low, eat or drink 15 grams of fast-acting carbohydrate  These are found naturally in fruits  Fast-acting carbohydrates will raise your blood sugar level quickly  Examples of 15 grams of fast-acting carbohydrate are 4 ounces (½ cup) of fruit juice or 4 ounces of regular soda  Other examples are 2 tablespoons of raisins or 3 to 4 glucose tablets  Check your blood sugar level 15 minutes later  If the level is still low (less than 100 mg/dL), eat another 15 grams of carbohydrate  When the level returns to 100 mg/dL, eat a snack or meal that contains carbohydrates  This will help prevent another drop in blood sugar  Always carefully follow your healthcare provider's instructions on how to treat low blood sugar levels  Check your feet each day for sores:  Wear shoes and socks that fit correctly  Do not trim your toenails  Ask your healthcare provider for more information about foot care  Follow your meal plan:  A dietitian will help you make a meal plan to keep your blood sugar level steady  Do not skip meals  Your blood sugar level may drop too low if you have taken diabetes medicine and do not eat  · Keep track of carbohydrates (sugar and starchy foods)  Your blood sugar level can get too high if you eat too many carbohydrates  Your dietitian will help you plan meals and snacks that have the right amount of carbohydrates  · Eat low-fat foods , such as skinless chicken and low-fat milk  · Eat less sodium (salt)    Limit high-sodium foods, such as soy sauce, potato chips, and soup  Do not add salt to food you cook  Limit your use of table salt  You should have less than 2,300 mg of sodium per day  · Eat high-fiber foods , such as vegetables, whole grain breads, and beans  · Limit alcohol  Alcohol affects your blood sugar level and can make it harder to manage your diabetes  Limit alcohol to 1 drink a day if you are a woman  Limit alcohol to 2 drinks a day if you are a man  A drink of alcohol is 12 ounces of beer, 5 ounces of wine, or 1½ ounces of liquor  Maintain a healthy weight:  Ask your healthcare provider how much you should weigh  A healthy weight can help you control your diabetes  Ask your provider to help you create a weight loss plan if you are overweight  Together you can set manageable weight loss goals  Exercise as directed:  Exercise can help keep your blood sugar level steady, decrease your risk of heart disease, and help you lose weight  Stretch before and after you exercise  Exercise for at least 150 minutes every week  Spread this amount of exercise over at least 3 days a week  Do not skip exercise more than 2 days in a row  Include muscle strengthening activities 2 to 3 days each week  Older adults should include balance training 2 to 3 times each week  Activities that help increase balance include yoga and stewart chi  Work with your healthcare provider to create an exercise plan  · Check your blood sugar level before and after exercise  Healthcare providers may tell you to change the amount of insulin you take or food you eat  If your blood sugar level is high, check your blood or urine for ketones before you exercise  Do not exercise if your blood sugar level is high and you have ketones  · If your blood sugar level is less than 100 mg/dL, have a carbohydrate snack before you exercise  Examples are 4 to 6 crackers, ½ banana, 8 ounces (1 cup) of milk, or 4 ounces (½ cup) of juice   Drink water or liquids that do not contain sugar before, during, and after exercise  Ask your dietitian or healthcare provider which liquids you should drink when you exercise  · Do not sit for longer than 30 minutes  If you cannot walk around, at least stand up  This will help you stay active and keep your blood circulating  Do not smoke:  Nicotine and other chemicals in cigarettes and cigars can cause lung damage and make it more difficult to manage your diabetes  Ask your healthcare provider for information if you currently smoke and need help to quit  Do not use e-cigarettes or smokeless tobacco in place of cigarettes or to help you quit  They still contain nicotine  Check your blood pressure as directed:  Ask your healthcare provider what your blood pressure should be  Most adults with diabetes and high blood pressure should have a systolic blood pressure (first number) less than 140  Your diastolic blood pressure (second number) should be less than 90  Wear medical alert identification:  Wear medical alert jewelry or carry a card that says you have diabetes  Ask your healthcare provider where to get these items  Ask about vaccines: You have a higher risk for serious illness if you get the flu, pneumonia, or hepatitis  Ask your healthcare provider if you should get a flu, pneumonia, or hepatitis B vaccine, and when to get the vaccine  Follow up with your healthcare provider as directed: You may need to return to have your A1c checked every 3 months  You will need to return at least once each year to have your feet checked  You will need an eye exam once a year to check for retinopathy  You will also need urine tests every year to check for kidney problems  You may need tests to monitor for heart disease such as an EKG, stress test, blood pressure monitoring, and blood tests  Write down your questions so you remember to ask them during your visits     © 2017 [employer] Information is for End User's use only and may not be sold, redistributed or otherwise used for commercial purposes  All illustrations and images included in CareNotes® are the copyrighted property of [employer]  or [employer]ell[employer]  The above information is an  only  It is not intended as medical advice for individual conditions or treatments  Talk to your doctor, nurse or pharmacist before following any medical regimen to see if it is safe and effective for you

## 2020-09-11 ENCOUNTER — OFFICE VISIT (OUTPATIENT)
Dept: PHYSICAL THERAPY | Facility: CLINIC | Age: 50
End: 2020-09-11
Payer: COMMERCIAL

## 2020-09-11 DIAGNOSIS — S86.912D STRAIN OF LEFT KNEE, SUBSEQUENT ENCOUNTER: ICD-10-CM

## 2020-09-11 DIAGNOSIS — R53.81 PHYSICAL DECONDITIONING: Primary | ICD-10-CM

## 2020-09-11 PROCEDURE — 97110 THERAPEUTIC EXERCISES: CPT | Performed by: PHYSICAL THERAPIST

## 2020-09-11 NOTE — PROGRESS NOTES
Daily Note     Today's date: 2020  Patient name: Carole Greene  : 1970  MRN: [id number]  Referring provider: Franck Ferreira DO  Dx:   Encounter Diagnosis     ICD-10-CM    1  Physical deconditioning  R53 81    2  Strain of left knee, subsequent encounter  [id number]        Start Time: 805  Stop Time: 905  Total time in clinic (min): 60 minutes    Subjective: Pt reporting that he doesn't have much pain, more stiffness than anything  Objective: See treatment diary below      Assessment: Increase in resistance with various TE this date with good tolerance per pt  Stretching progressed to self stretch in order to teach pt proper techniques for independence with stretching at home  Continued gait dysfunction and mm weakness noted B LE with need for continued therapy to address  Plan: Continue per plan of care        Precautions: cerv surgery, back pain    Re-eval Date:visit 10    Date       Visit Count 6 7      FOTO NV       Pain In 2-3/10 stiffness "stiff"       Pain Out                Manuals    8 24 20   Ham stretch 4 x 30" Self stretch   30" x 4    4x30"   Quad stretch 4 x 30" Self stretch   30" x 4    **4x30"   Groin stretch     **4x30"   pirif stretch 4 x 30"       ITB 4 x 30"    4x30"   Neuro Re-Ed                                                                Ther Ex       Nustep L2   10' L3 10 min       QS HEP       SAQ HEP       Heel slides HEP       SLRs  B 2#  30x 2# x 30       SL abd L 2#  30x 2# x 30       bridges 30 x 5" 5" x 30       Add squeeze 40x 5" 5" x 30       Leg press 80 #  30x 90# x 30       Leg ext mach 33#  30x 33# x 30       Leg flex mach 33#  30x 44# x 30       Wt shift in // bars DC       Standing  ea X 30       Step ups 25x  ea        /fwd/lat Fwd/Lat  6"   X 30 ea       TRs/HRs 40x X 30                                       Ther Activity                        Gait Training                        Modalities     8 24 20   CP to L knee Will apply @ home today

## 2020-09-15 ENCOUNTER — OFFICE VISIT (OUTPATIENT)
Dept: PHYSICAL THERAPY | Facility: CLINIC | Age: 50
End: 2020-09-15
Payer: COMMERCIAL

## 2020-09-15 DIAGNOSIS — R53.81 PHYSICAL DECONDITIONING: Primary | ICD-10-CM

## 2020-09-15 PROCEDURE — 97110 THERAPEUTIC EXERCISES: CPT

## 2020-09-18 ENCOUNTER — APPOINTMENT (OUTPATIENT)
Dept: PHYSICAL THERAPY | Facility: CLINIC | Age: 50
End: 2020-09-18
Payer: COMMERCIAL

## 2020-09-21 ENCOUNTER — OFFICE VISIT (OUTPATIENT)
Dept: PHYSICAL THERAPY | Facility: CLINIC | Age: 50
End: 2020-09-21
Payer: COMMERCIAL

## 2020-09-21 DIAGNOSIS — S86.912D STRAIN OF LEFT KNEE, SUBSEQUENT ENCOUNTER: ICD-10-CM

## 2020-09-21 DIAGNOSIS — R53.81 PHYSICAL DECONDITIONING: Primary | ICD-10-CM

## 2020-09-21 PROCEDURE — 97110 THERAPEUTIC EXERCISES: CPT | Performed by: PHYSICAL THERAPIST

## 2020-09-21 NOTE — PROGRESS NOTES
PT Re-Evaluation     Today's date: 2020  Patient name: Chloé Barros  : 1970  MRN: [id number]  Referring provider: Rickie Siemens, DO  Dx:   Encounter Diagnosis     ICD-10-CM    1  Physical deconditioning  R53 81    2  Strain of left knee, subsequent encounter  S86 [id number]                   Assessment  Assessment details: Chloé Barros is a 52 y o  male presenting to outpatient physical therapy with diagnosis of physical deconditioning, strain of left knee  He has made some progress with PT thus far, however, he has ongoing pain  Sensation of knee buckling, ongoing radicular symptoms  Left > right leg weakness, pain with hip scour  He notes that he has tightness in back, thigh, into groin at times  Does self stretching and HEP regularly  He does note that he has been able to progress from his AD for the past few weeks  Patient's current impairments include pain, impaired flexibility in hamstrings, reduced range of motion, reduced strength, reduced postural awareness, and reduced activity tolerance  Patient's present functional limitations include difficulty with ADLs with increased need for assistance, reliance on medication and/or modalities for pain relief, poor tolerance for functional mobility and activity, and difficulty completing household responsibilities  Patient may benefit from ongoing assessments/interventions with apparent slow progress with OPPT  Has follow up appointment 2020 with Dr Deejay Salter  Will continue to follow  Impairments: abnormal gait, abnormal muscle tone, abnormal or restricted ROM, activity intolerance, impaired physical strength, lacks appropriate home exercise program, pain with function and weight-bearing intolerance  Barriers to therapy: Overweight, back pain  Understanding of Dx/Px/POC: good   Prognosis: good    Goals  STGs to be achieved in 4 weeks:  1   Pt to demonstrate reduced subjective pain rating "at worst" by at least 2-3 points from Initial Eval in order to allow for reduced pain with ADLs and improved functional activity tolerance - ONGOING  2  Pt to demonstrate increased AROM of L knee by at least 5-10 degrees in order to allow for greater ease and independence with ADLs and functional mobility - MET  3  Pt to demonstrate full PROM of L knee in order to maximize joint mobility and function and allow for progression of exercise program and achievement of goals - MET exception of restricted HS at -25 degrees  4  Pt to demonstrate increased MMT of L knee  by at least 1/2-1 grade in order to improve safety and stability with ADLs and functional mobility - MET  LTGs to be achieved in 6-8 weeks: - ONGOING as listed  1  Pt will be I with HEP in order to continue to improve quality of life and independence and reduce risk for re-injury  2  Pt to demonstrate return to indep amb w/o Ads with nl gt pattern without limitations or restrictions  3  Pt to demonstrate improved function as noted by achieving or exceeding predicted score on FOTO outcomes assessment tool  Plan  Patient would benefit from: skilled physical therapy  Planned therapy interventions: home exercise program, strengthening, stretching, therapeutic exercise, manual therapy, balance and therapeutic activities  Frequency: 2x week  Duration in weeks: 4  Plan of Care beginning date: 9/21/2020  Plan of Care expiration date: 10/21/2020  Treatment plan discussed with: patient        Subjective Evaluation    History of Present Illness  Mechanism of injury: UPDATE:  Patient reports a deep tightness, pinching in the back/hip area  That goes into the hip and down the leg or into the groin  He did have an MRI of the left knee, negative  He feels that most of his symptoms are coming from the back  He finds himself struggling on uneven surfaces, foam surfaces  He feels his left ankle roll out at times  He does note a fall down the stairs in November of 2019    He was crawling to get to bed and felt a pop in his knee  No one ever really looked at his back  Does note that he gets a rolling feeling in his left ankle  He also notes a numbness that runs past his knee and into his calf/lateral leg  Does have a long history of back problems and was going through pain management approximately 20 years ago  Notes he didn't have any trouble with his knee prior to the fall  Of note:  Poor force generation for left LE step up  Difficulty with moving leg in/out of bed or car  Sensation of buckling, worse after prolonged sitting  Was seen by comp spine and was referred back with possible hip involvement        In early Feb, while pt was recovering from a severely sprained L ankle, pt was crawling in his home and heard a pop in the L knee  Pt had shot of pain up and down the leg   As time progressed he began to experience tightness in the knee and could not fully extend it  Also notes numbness above the knee and proximal 1/3 of L ant low leg  Pt saw Dr Aguiar Rachele  and had cortisone injection L knee  Pt notes improved mobility with inj and "can straighten the knee better  Pt is using a rw for amb since March  Pt having diff walking, straightening the knee, diff weight bearing LLE  Pt avoiding stairs, stays on first floor  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  Pt has hx of back pain, and cerv surgery  Pt had several back adjustments by chiropractor and knee pain has since decreased            Not a recurrent problem   Quality of life: fair    Pain  Current pain ratin (seated)  At best pain ratin  At worst pain rating: 3  Location: L knee  Quality: tight, dull ache and discomfort  Relieving factors: rest  Aggravating factors: walking and standing (laying flat on back or on L side)  Progression: improved    Social Support  Steps to enter house: yes (1)  Stairs in house: yes (does not use)   Lives in: multiple-level home  Lives with: adult children and parents    Employment status: not working  Hand dominance: left      Diagnostic Tests  MRI studies: normal (L knee)  Treatments  Previous treatment: chiropractic  Current treatment: injection treatment and physical therapy  Patient Goals  Patient goals for therapy: decreased pain, increased motion and increased strength  Patient goal: ret to PLOF, indep amb w/o asst devices        Objective     Observations     Additional Observation Details  At eval:  Pt is obese' atrophy L quad/ham, and calf  Pt amb with antalgic gt using rw with cont gt pattern and fwd lean onto arms of walker    At re-eval:   Ongoing left LE weakness, ambulates without AD upon entry, decreased left hip extension  Increased hip ER, asymmetry of pelvic movement  Tenderness     Additional Tenderness Details  Increased pain with hip ROM  Increased TTP left SI/PSIS region  Neurological Testing     Sensation     Knee   Left Knee   Intact: hot/cold discrimination  Diminished: light touch     Right Knee   Intact: hot/cold discrimination     Comments   Left light touch: pat tendon insertion area  Active Range of Motion   Left Knee   Flexion: 117 degrees   Extension: -10 degrees   Extensor la degrees     Right Knee   Flexion: 135 degrees   Extension: 0 degrees     Additional Active Range of Motion Details  Hip flexion to 90 degrees with tightness at end range  (+) hamstring limitations    Strength/Myotome Testing     Left Knee   Flexion: 4  Extension: 4    Right Knee   Flexion: 4+  Extension: 5    Additional Strength Details  Hip flexion: 4-/5 flexion  Hip ABd: 4-/5 ABd  Hip ADd: 4/5      Tests     Left Knee   Negative anterior drawer and posterior drawer       Additional Tests Details  (+) hip scour    Swelling     Left Knee Girth Measurement (cm)   Joint line: 39 cm  10 cm above joint line: 44 5 cm  10 cm below joint line: 36 cm    Right Knee Girth Measurement (cm)   Joint line: 39 cm  10 cm above joint line: 47 cm  10 cm below joint line: 38 5 cm Precautions: cerv surgery, back pain  Re-eval Date:visit 10    Date 9/4 9/11 9 15 20 9/21    Visit Count 6 7 8     FOTO NV  **completed     Pain In 2-3/10 stiffness "stiff"  stiff See RE    Pain Out   Less stiffness See RE            Manuals 9/4 9/11 9 15 20 9/21 8 24 20   Ham stretch 4 x 30" Self stretch   30" x 4  Self stretch   30" x 4  D/C self stretch, focus on HS, ITB, hip flexor stretch 4x30"   Quad stretch 4 x 30" Self stretch   30" x 4  Self stretch   30" x 4   **4x30"   Groin stretch     **4x30"   pirif stretch 4 x 30"         ITB 4 x 30"      4x30"   Neuro Re-Ed        Rhom EO    With head turns  **    Rhom EC    ** Foam    Tandem R    ** Foam    Tandem L    ** Foam                            Ther Ex 9/4 9/11 9 15 20     Nustep L2   10' L3 10 min  L3 10 min  D/C Nustep  Upright bike  10 mins  L2    QS HEP       SAQ HEP       Heel slides HEP       SLRs  B 2#  30x 2# x 30  *Stand on Foam 1x30 B Stand  Flex/ABd/Extn/HS curls  Foam  0-1 UE support  NV    SL abd L   2#  30x 2# x 30  **Hip Abd/Ext Stand B 1x30 ea on Foam     bridges 30 x 5" 5" x 30  5" x 30        Add squeeze 40x 5" 5" x 30  *reviewed for HEP     Leg press 80 #  30x 90# x 30  Squat 90# 30x  **calf raise 50# 30x 100#  2x10 B/L  90# single LE R  2x10  55# single LE L  2x10     Leg ext mach 33#  30x 33# x 30  33#  2x20 44#  2x10 B/L  22#  1x10 single ea    Leg flex mach 33#  30x 44# x 30  44# x 30  33#  2x10 B/L  22#  1x10 single ea    Wt shift in // bars DC       Standing march 30 ea X 30  40x on Foam D/C    Step ups 25x  ea        /fwd/lat Fwd/Lat  6"   X 30 ea  Fwd/Lat  6"   X 30 ea Fwd  2x10 6"  Lateral  2x10 6"  Eccentric lower  Attempt NV    TRs/HRs 40x X 30  40x on Foam Stand  Foam  2x15  Single UE support Progress to no UEs                                   Ther Activity                        Gait Training                        Modalities   9 15 20     CP to L knee   Deferred

## 2020-09-24 ENCOUNTER — OFFICE VISIT (OUTPATIENT)
Dept: PHYSICAL THERAPY | Facility: CLINIC | Age: 50
End: 2020-09-24
Payer: COMMERCIAL

## 2020-09-24 DIAGNOSIS — S86.912D STRAIN OF LEFT KNEE, SUBSEQUENT ENCOUNTER: ICD-10-CM

## 2020-09-24 DIAGNOSIS — R53.81 PHYSICAL DECONDITIONING: Primary | ICD-10-CM

## 2020-09-24 PROCEDURE — 97110 THERAPEUTIC EXERCISES: CPT | Performed by: PHYSICAL THERAPIST

## 2020-09-24 PROCEDURE — 97140 MANUAL THERAPY 1/> REGIONS: CPT | Performed by: PHYSICAL THERAPIST

## 2020-09-24 NOTE — PROGRESS NOTES
Daily Note     Today's date: 2020  Patient name: Angela Xie  : 1970  MRN: [id number]  Referring provider: Saniya Ching DO  Dx:   Encounter Diagnosis     ICD-10-CM    1  Physical deconditioning  R53 81    2  Strain of left knee, subsequent encounter  [id number]                   Subjective: I was sore after the last time, but nothing too new  Objective: See treatment diary below      Assessment: Tolerated treatment well  Patient demonstrated fatigue post treatment and would benefit from continued PT  Continues with ongoing discomfort, proximal hip weakness  Follow up appointment 2020  Plans to discuss ongoing pain  Hold further sessions until after MD appointment  Patient to touch base with clinic after appointment on Tuesday  Plan: Continue per plan of care        Precautions: cerv surgery, back pain  Re-eval Date:visit 10    Date  9 15 20    Visit Count 6 7 8 1/10 2/10   FOTO NV  **completed     Pain In 2-3/10 stiffness "stiff"  stiff See RE stiff   Pain Out   Less stiffness See RE stiff           Manuals  9 15 20     Ham stretch 4 x 30" Self stretch   30" x 4  Self stretch   30" x 4  D/C self stretch, focus on HS, ITB, hip flexor stretch 10 mins to bilateral HS, ITB, piriformis, gentle hip distraction   Quad stretch 4 x 30" Self stretch   30" x 4  Self stretch   30" x 4      Groin stretch        pirif stretch 4 x 30"         ITB 4 x 30"         Neuro Re-Ed        Rhom EO    With head turns  **    Rhom EC    ** Foam    Tandem R    ** Foam    Tandem L    ** Foam                            Ther Ex  9 15 20     Nustep L2   10' L3 10 min  L3 10 min  D/C Nustep  Upright bike  10 mins  L2 Upright bike  10 mins  L2   QS HEP       SAQ HEP       Heel slides HEP       SLRs  B 2#  30x 2# x 30  *Stand on Foam 1x30 B Stand  Flex/ABd/Extn/HS curls  Foam  0-1 UE support  NV    SL abd L   2#  30x 2# x 30  **Hip Abd/Ext Stand B 1x30 ea on Foam     bridges 30 x 5" 5" x 30  5" x 30        Add squeeze 40x 5" 5" x 30  *reviewed for HEP     Leg press 80 #  30x 90# x 30  Squat 90# 30x  **calf raise 50# 30x 100#  2x10 B/L  90# single LE R  2x10  55# single LE L  2x10  100#  2x10 B/L  100# single LE R  2x10  40# single LE L  2x10    Leg ext mach 33#  30x 33# x 30  33#  2x20 44#  2x10 B/L  22#  1x10 single ea 44#  2x10 B/L  22#  1x10 single ea   Leg flex mach 33#  30x 44# x 30  44# x 30  33#  2x10 B/L  22#  1x10 single ea 44#  2x10 B/L  22#  1x10 single ea   Wt shift in // bars DC       Standing march 30 ea X 30  40x on Foam D/C    Step ups 25x  ea        /fwd/lat Fwd/Lat  6"   X 30 ea  Fwd/Lat  6"   X 30 ea Fwd  2x10 6"  Lateral  2x10 6"  Eccentric lower  Attempt NV Fwd  1x10 6"  Lateral  1x10 6"  Eccentric lower  1x10, 6"   TRs/HRs 40x X 30  40x on Foam Stand  Foam  2x15  Single UE support Stand  Foam  2x15  Single UE support                                   Ther Activity                        Gait Training                        Modalities   9 15 20     CP to L knee   Deferred

## 2020-09-29 ENCOUNTER — OFFICE VISIT (OUTPATIENT)
Dept: OBGYN CLINIC | Facility: CLINIC | Age: 50
End: 2020-09-29
Payer: COMMERCIAL

## 2020-09-29 VITALS
TEMPERATURE: 98 F | HEIGHT: 71 IN | HEART RATE: 73 BPM | DIASTOLIC BLOOD PRESSURE: 87 MMHG | BODY MASS INDEX: 36.96 KG/M2 | SYSTOLIC BLOOD PRESSURE: 139 MMHG | WEIGHT: 264 LBS

## 2020-09-29 DIAGNOSIS — M47.816 OSTEOARTHRITIS OF LUMBAR SPINE, UNSPECIFIED SPINAL OSTEOARTHRITIS COMPLICATION STATUS: Primary | ICD-10-CM

## 2020-09-29 PROCEDURE — 3079F DIAST BP 80-89 MM HG: CPT | Performed by: ORTHOPAEDIC SURGERY

## 2020-09-29 PROCEDURE — 1036F TOBACCO NON-USER: CPT | Performed by: ORTHOPAEDIC SURGERY

## 2020-09-29 PROCEDURE — 3075F SYST BP GE 130 - 139MM HG: CPT | Performed by: ORTHOPAEDIC SURGERY

## 2020-09-29 PROCEDURE — 99213 OFFICE O/P EST LOW 20 MIN: CPT | Performed by: ORTHOPAEDIC SURGERY

## 2020-09-29 NOTE — PROGRESS NOTES
ASSESSMENT/PLAN:    Diagnoses and all orders for this visit:    Osteoarthritis of lumbar spine, unspecified spinal osteoarthritis complication status  -     Ambulatory referral to Pain Management; Future        The patient has been doing well since starting physical therapy  His knee pain has improved  He does still complain of some lumbar back pain  He will be given a referral for pain management for his back  With regards to his knee, he should continue physical therapy  He can follow up with our office as needed  The patient is acceptable this plan  Return if symptoms worsen or fail to improve       _____________________________________________________  CHIEF COMPLAINT:  Chief Complaint   Patient presents with    Left Knee - Follow-up    Spine - Follow-up         SUBJECTIVE:  Emanuel Smith is a 52 y o  male who presents to our office for a 2 month follow-up  The patient was previously seen in our office for DJD of his left knee and lumbar spine  He was given a referral for physical therapy last visit  He states that his pain in his knee has greatly improved  He is now ambulating without an assist device or an antalgic gait  He does still complain of some lower back pain  He denies any numbness or tingling  He denies any fever or chills      The following portions of the patient's history were reviewed and updated as appropriate: allergies, current medications, past family history, past medical history, past social history, past surgical history and problem list     PAST MEDICAL HISTORY:  Past Medical History:   Diagnosis Date    Diabetes mellitus ([address] )     Hypertension     Kidney stones        PAST SURGICAL HISTORY:  Past Surgical History:   Procedure Laterality Date    CHOLECYSTECTOMY      NECK SURGERY         FAMILY HISTORY:  Family History   Problem Relation Age of Onset    Hypertension Mother     Diabetes Mother     Breast cancer Mother     Diabetes Father     Diabetes Sister  Fibromyalgia Sister        SOCIAL HISTORY:  Social History     Tobacco Use    Smoking status: Never Smoker    Smokeless tobacco: Never Used   Substance Use Topics    Alcohol use: Never     Frequency: Never    Drug use: Never       MEDICATIONS:    Current Outpatient Medications:     Ascorbic Acid (VITAMIN C) 1000 MG tablet, Take 1,000 mg by mouth daily, Disp: , Rfl:     atorvastatin (LIPITOR) 20 mg tablet, Take 1 tablet (20 mg total) by mouth daily, Disp: 90 tablet, Rfl: 3    Stacey Microlet Lancets lancets, Use as instructed, Disp: 100 each, Rfl: 1    Blood Glucose Monitoring Suppl (blogTV CONTOUR MONITOR) TRACEY, by Does not apply route daily, Disp: 1 Device, Rfl: 0    Cholecalciferol (VITAMIN D3) 511172 UNIT/GM POWD, by Does not apply route, Disp: , Rfl:     glimepiride (AMARYL) 4 mg tablet, One po q day x 5 and if sugars still >150 increase to two po q day , Disp: 90 tablet, Rfl: 1    glucose blood test strip, Use as instructed, Disp: 100 each, Rfl: 1    metoprolol succinate (TOPROL-XL) 100 mg 24 hr tablet, Take 0 5 tablets (50 mg total) by mouth daily, Disp: 90 tablet, Rfl: 1    ALLERGIES:  Allergies   Allergen Reactions    Codeine Nausea Only    Lisinopril      swelling    Metformin      GI    Penicillins Hives       ROS:  Review of Systems     Constitutional: Negative for fatigue, fever or loss of apetite  HENT: Negative  Respiratory: Negative for shortness of breath, dyspnea  Cardiovascular: Negative for chest pain/tightness  Gastrointestinal: Negative for abdominal pain, N/V  Endocrine: Negative for cold/heat intolerance, unexplained weight loss/gain  Genitourinary: Negative for flank pain, dysuria, hematuria  Musculoskeletal: Positive for arthralgia   Skin: Negative for rash  Neurological: Negative for numbness or tingling  Psychiatric/Behavioral: Negative for agitation    _____________________________________________________  PHYSICAL EXAMINATION:    Blood pressure 139/87, pulse 73, temperature 98 °F (36 7 °C), temperature source Tympanic, height 5' 11" (1 803 m), weight 120 kg (264 lb)  Constitutional: Oriented to person, place, and time  Appears well-developed and well-nourished  No distress  HENT:   Head: Normocephalic  Eyes: Conjunctivae are normal  Right eye exhibits no discharge  Left eye exhibits no discharge  No scleral icterus  Cardiovascular: Normal rate  Pulmonary/Chest: Effort normal    Neurological: Alert and oriented to person, place, and time  Skin: Skin is warm and dry  No rash noted  Not diaphoretic  No erythema  No pallor  Psychiatric: Normal mood and affect  Behavior is normal  Judgment and thought content normal       MUSCULOSKELETAL EXAMINATION:   Physical Exam  Back Exam     Tenderness   The patient is experiencing tenderness in the lumbar  Other   Toe walk: normal  Heel walk: normal  Sensation: normal  Gait: normal   Erythema: no back redness  Scars: absent            Left lower extremity is neurovascularly intact  Toes are pink and mobile  Compartments are soft  Range of motion of the knee is from 0-120 degrees  No tenderness to palpation  No warmth, erythema or ecchymosis present  Negative Lachman, drawer or pivot shift      Objective:  BP Readings from Last 1 Encounters:   09/29/20 139/87      Wt Readings from Last 1 Encounters:   09/29/20 120 kg (264 lb)        BMI:   Estimated body mass index is 36 82 kg/m² as calculated from the following:    Height as of this encounter: 5' 11" (1 803 m)  Weight as of this encounter: 120 kg (264 lb)        Farshad Muñoz PA-C

## 2020-10-07 ENCOUNTER — OFFICE VISIT (OUTPATIENT)
Dept: PHYSICAL THERAPY | Facility: CLINIC | Age: 50
End: 2020-10-07
Payer: COMMERCIAL

## 2020-10-07 DIAGNOSIS — R53.81 PHYSICAL DECONDITIONING: Primary | ICD-10-CM

## 2020-10-07 DIAGNOSIS — S86.912D STRAIN OF LEFT KNEE, SUBSEQUENT ENCOUNTER: ICD-10-CM

## 2020-10-07 PROCEDURE — 97110 THERAPEUTIC EXERCISES: CPT

## 2020-10-09 ENCOUNTER — OFFICE VISIT (OUTPATIENT)
Dept: PHYSICAL THERAPY | Facility: CLINIC | Age: 50
End: 2020-10-09
Payer: COMMERCIAL

## 2020-10-09 ENCOUNTER — TELEPHONE (OUTPATIENT)
Dept: INTERNAL MEDICINE CLINIC | Facility: CLINIC | Age: 50
End: 2020-10-09

## 2020-10-09 DIAGNOSIS — Z78.9 DRUG INTOLERANCE: ICD-10-CM

## 2020-10-09 DIAGNOSIS — S86.912D STRAIN OF LEFT KNEE, SUBSEQUENT ENCOUNTER: ICD-10-CM

## 2020-10-09 DIAGNOSIS — R53.81 PHYSICAL DECONDITIONING: Primary | ICD-10-CM

## 2020-10-09 DIAGNOSIS — E11.9 TYPE 2 DIABETES MELLITUS WITHOUT COMPLICATION, WITHOUT LONG-TERM CURRENT USE OF INSULIN (HCC): ICD-10-CM

## 2020-10-09 DIAGNOSIS — I10 ESSENTIAL HYPERTENSION: ICD-10-CM

## 2020-10-09 PROCEDURE — 97110 THERAPEUTIC EXERCISES: CPT

## 2020-10-09 RX ORDER — GLIMEPIRIDE 4 MG/1
TABLET ORAL
Qty: 90 TABLET | Refills: 1 | Status: SHIPPED | OUTPATIENT
Start: 2020-10-09 | End: 2020-11-11 | Stop reason: SDUPTHER

## 2020-10-14 ENCOUNTER — OFFICE VISIT (OUTPATIENT)
Dept: PHYSICAL THERAPY | Facility: CLINIC | Age: 50
End: 2020-10-14
Payer: COMMERCIAL

## 2020-10-14 DIAGNOSIS — R53.81 PHYSICAL DECONDITIONING: Primary | ICD-10-CM

## 2020-10-14 DIAGNOSIS — S86.912D STRAIN OF LEFT KNEE, SUBSEQUENT ENCOUNTER: ICD-10-CM

## 2020-10-14 PROCEDURE — 97110 THERAPEUTIC EXERCISES: CPT

## 2020-10-16 ENCOUNTER — APPOINTMENT (OUTPATIENT)
Dept: PHYSICAL THERAPY | Facility: CLINIC | Age: 50
End: 2020-10-16
Payer: COMMERCIAL

## 2020-10-26 ENCOUNTER — CONSULT (OUTPATIENT)
Dept: PAIN MEDICINE | Facility: CLINIC | Age: 50
End: 2020-10-26
Payer: COMMERCIAL

## 2020-10-26 VITALS
WEIGHT: 279.6 LBS | SYSTOLIC BLOOD PRESSURE: 144 MMHG | TEMPERATURE: 99.2 F | HEIGHT: 71 IN | DIASTOLIC BLOOD PRESSURE: 82 MMHG | BODY MASS INDEX: 39.14 KG/M2

## 2020-10-26 DIAGNOSIS — M51.16 LUMBAR DISC DISEASE WITH RADICULOPATHY: ICD-10-CM

## 2020-10-26 DIAGNOSIS — M54.16 LUMBAR RADICULOPATHY: Primary | ICD-10-CM

## 2020-10-26 DIAGNOSIS — G89.4 CHRONIC PAIN SYNDROME: ICD-10-CM

## 2020-10-26 DIAGNOSIS — M47.816 LUMBAR SPONDYLOSIS: ICD-10-CM

## 2020-10-26 DIAGNOSIS — M79.18 MYOFASCIAL PAIN SYNDROME: ICD-10-CM

## 2020-10-26 DIAGNOSIS — M47.816 OSTEOARTHRITIS OF LUMBAR SPINE, UNSPECIFIED SPINAL OSTEOARTHRITIS COMPLICATION STATUS: ICD-10-CM

## 2020-10-26 PROCEDURE — 3079F DIAST BP 80-89 MM HG: CPT | Performed by: ANESTHESIOLOGY

## 2020-10-26 PROCEDURE — 3077F SYST BP >= 140 MM HG: CPT | Performed by: ANESTHESIOLOGY

## 2020-10-26 PROCEDURE — 99244 OFF/OP CNSLTJ NEW/EST MOD 40: CPT | Performed by: ANESTHESIOLOGY

## 2020-11-09 ENCOUNTER — TELEPHONE (OUTPATIENT)
Dept: PAIN MEDICINE | Facility: CLINIC | Age: 50
End: 2020-11-09

## 2020-11-10 DIAGNOSIS — E11.9 TYPE 2 DIABETES MELLITUS WITHOUT COMPLICATION, WITHOUT LONG-TERM CURRENT USE OF INSULIN (HCC): ICD-10-CM

## 2020-11-10 RX ORDER — BLOOD SUGAR DIAGNOSTIC
STRIP MISCELLANEOUS
Qty: 100 EACH | Refills: 1 | Status: SHIPPED | OUTPATIENT
Start: 2020-11-10 | End: 2021-08-23

## 2020-11-11 ENCOUNTER — OFFICE VISIT (OUTPATIENT)
Dept: INTERNAL MEDICINE CLINIC | Facility: CLINIC | Age: 50
End: 2020-11-11
Payer: COMMERCIAL

## 2020-11-11 ENCOUNTER — TELEPHONE (OUTPATIENT)
Dept: RADIOLOGY | Facility: MEDICAL CENTER | Age: 50
End: 2020-11-11

## 2020-11-11 VITALS
TEMPERATURE: 98.1 F | WEIGHT: 279 LBS | DIASTOLIC BLOOD PRESSURE: 72 MMHG | RESPIRATION RATE: 18 BRPM | SYSTOLIC BLOOD PRESSURE: 124 MMHG | HEART RATE: 93 BPM | HEIGHT: 71 IN | OXYGEN SATURATION: 93 % | BODY MASS INDEX: 39.06 KG/M2

## 2020-11-11 DIAGNOSIS — Z12.5 SCREENING FOR PROSTATE CANCER: ICD-10-CM

## 2020-11-11 DIAGNOSIS — Z13.220 SCREENING FOR LIPID DISORDERS: ICD-10-CM

## 2020-11-11 DIAGNOSIS — G89.4 CHRONIC PAIN SYNDROME: ICD-10-CM

## 2020-11-11 DIAGNOSIS — Z78.9 DRUG INTOLERANCE: ICD-10-CM

## 2020-11-11 DIAGNOSIS — E11.9 ENCOUNTER FOR DIABETIC FOOT EXAM (HCC): ICD-10-CM

## 2020-11-11 DIAGNOSIS — Z13.0 SCREENING FOR DEFICIENCY ANEMIA: ICD-10-CM

## 2020-11-11 DIAGNOSIS — Z23 ENCOUNTER FOR VACCINATION: ICD-10-CM

## 2020-11-11 DIAGNOSIS — E78.2 MIXED HYPERLIPIDEMIA: ICD-10-CM

## 2020-11-11 DIAGNOSIS — Z13.29 SCREENING FOR THYROID DISORDER: ICD-10-CM

## 2020-11-11 DIAGNOSIS — M50.30 DDD (DEGENERATIVE DISC DISEASE), CERVICAL: ICD-10-CM

## 2020-11-11 DIAGNOSIS — D69.6 THROMBOCYTOPENIA (HCC): ICD-10-CM

## 2020-11-11 DIAGNOSIS — Z13.1 SCREENING FOR DIABETES MELLITUS (DM): ICD-10-CM

## 2020-11-11 DIAGNOSIS — I10 ESSENTIAL HYPERTENSION: ICD-10-CM

## 2020-11-11 DIAGNOSIS — E11.9 TYPE 2 DIABETES MELLITUS WITHOUT COMPLICATION, WITHOUT LONG-TERM CURRENT USE OF INSULIN (HCC): Primary | ICD-10-CM

## 2020-11-11 DIAGNOSIS — Z11.4 SCREENING FOR HIV (HUMAN IMMUNODEFICIENCY VIRUS): ICD-10-CM

## 2020-11-11 LAB — SL AMB POCT HEMOGLOBIN AIC: 8 (ref ?–6.5)

## 2020-11-11 PROCEDURE — 99214 OFFICE O/P EST MOD 30 MIN: CPT | Performed by: INTERNAL MEDICINE

## 2020-11-11 PROCEDURE — 3008F BODY MASS INDEX DOCD: CPT | Performed by: INTERNAL MEDICINE

## 2020-11-11 PROCEDURE — 1036F TOBACCO NON-USER: CPT | Performed by: INTERNAL MEDICINE

## 2020-11-11 PROCEDURE — 83036 HEMOGLOBIN GLYCOSYLATED A1C: CPT | Performed by: INTERNAL MEDICINE

## 2020-11-11 PROCEDURE — 3052F HG A1C>EQUAL 8.0%<EQUAL 9.0%: CPT | Performed by: INTERNAL MEDICINE

## 2020-11-11 RX ORDER — GLIMEPIRIDE 4 MG/1
TABLET ORAL
Qty: 180 TABLET | Refills: 0 | Status: SHIPPED | OUTPATIENT
Start: 2020-11-11 | End: 2021-05-28 | Stop reason: SDUPTHER

## 2020-12-15 ENCOUNTER — LAB (OUTPATIENT)
Dept: LAB | Facility: CLINIC | Age: 50
End: 2020-12-15
Payer: COMMERCIAL

## 2020-12-15 DIAGNOSIS — Z12.5 SCREENING FOR PROSTATE CANCER: ICD-10-CM

## 2020-12-15 DIAGNOSIS — E11.9 TYPE 2 DIABETES MELLITUS WITHOUT COMPLICATION, WITHOUT LONG-TERM CURRENT USE OF INSULIN (HCC): ICD-10-CM

## 2020-12-15 DIAGNOSIS — E78.2 MIXED HYPERLIPIDEMIA: ICD-10-CM

## 2020-12-15 DIAGNOSIS — Z11.4 SCREENING FOR HIV (HUMAN IMMUNODEFICIENCY VIRUS): ICD-10-CM

## 2020-12-15 LAB
ALBUMIN SERPL BCP-MCNC: 3.3 G/DL (ref 3.5–5)
ALP SERPL-CCNC: 115 U/L (ref 46–116)
ALT SERPL W P-5'-P-CCNC: 46 U/L (ref 12–78)
ANION GAP SERPL CALCULATED.3IONS-SCNC: 2 MMOL/L (ref 4–13)
AST SERPL W P-5'-P-CCNC: 36 U/L (ref 5–45)
BASOPHILS # BLD AUTO: 0.04 THOUSANDS/ΜL (ref 0–0.1)
BASOPHILS NFR BLD AUTO: 1 % (ref 0–1)
BILIRUB SERPL-MCNC: 0.62 MG/DL (ref 0.2–1)
BUN SERPL-MCNC: 14 MG/DL (ref 5–25)
CALCIUM ALBUM COR SERPL-MCNC: 10.1 MG/DL (ref 8.3–10.1)
CALCIUM SERPL-MCNC: 9.5 MG/DL (ref 8.3–10.1)
CHLORIDE SERPL-SCNC: 106 MMOL/L (ref 100–108)
CO2 SERPL-SCNC: 32 MMOL/L (ref 21–32)
CREAT SERPL-MCNC: 0.96 MG/DL (ref 0.6–1.3)
CREAT UR-MCNC: 214 MG/DL
EOSINOPHIL # BLD AUTO: 0.1 THOUSAND/ΜL (ref 0–0.61)
EOSINOPHIL NFR BLD AUTO: 2 % (ref 0–6)
ERYTHROCYTE [DISTWIDTH] IN BLOOD BY AUTOMATED COUNT: 13.9 % (ref 11.6–15.1)
GFR SERPL CREATININE-BSD FRML MDRD: 93 ML/MIN/1.73SQ M
GLUCOSE P FAST SERPL-MCNC: 118 MG/DL (ref 65–99)
HCT VFR BLD AUTO: 50.7 % (ref 36.5–49.3)
HGB BLD-MCNC: 15.8 G/DL (ref 12–17)
IMM GRANULOCYTES # BLD AUTO: 0.01 THOUSAND/UL (ref 0–0.2)
IMM GRANULOCYTES NFR BLD AUTO: 0 % (ref 0–2)
LDLC SERPL DIRECT ASSAY-MCNC: 61 MG/DL (ref 0–100)
LYMPHOCYTES # BLD AUTO: 1.69 THOUSANDS/ΜL (ref 0.6–4.47)
LYMPHOCYTES NFR BLD AUTO: 27 % (ref 14–44)
MCH RBC QN AUTO: 28.1 PG (ref 26.8–34.3)
MCHC RBC AUTO-ENTMCNC: 31.2 G/DL (ref 31.4–37.4)
MCV RBC AUTO: 90 FL (ref 82–98)
MICROALBUMIN UR-MCNC: 33.7 MG/L (ref 0–20)
MICROALBUMIN/CREAT 24H UR: 16 MG/G CREATININE (ref 0–30)
MONOCYTES # BLD AUTO: 0.84 THOUSAND/ΜL (ref 0.17–1.22)
MONOCYTES NFR BLD AUTO: 13 % (ref 4–12)
NEUTROPHILS # BLD AUTO: 3.62 THOUSANDS/ΜL (ref 1.85–7.62)
NEUTS SEG NFR BLD AUTO: 57 % (ref 43–75)
NRBC BLD AUTO-RTO: 0 /100 WBCS
PLATELET # BLD AUTO: 103 THOUSANDS/UL (ref 149–390)
PMV BLD AUTO: 12.2 FL (ref 8.9–12.7)
POTASSIUM SERPL-SCNC: 4.8 MMOL/L (ref 3.5–5.3)
PROT SERPL-MCNC: 7.4 G/DL (ref 6.4–8.2)
PSA SERPL-MCNC: 0.5 NG/ML (ref 0–4)
RBC # BLD AUTO: 5.62 MILLION/UL (ref 3.88–5.62)
SODIUM SERPL-SCNC: 140 MMOL/L (ref 136–145)
TRIGL SERPL-MCNC: 55 MG/DL
TSH SERPL DL<=0.05 MIU/L-ACNC: 2 UIU/ML (ref 0.36–3.74)
WBC # BLD AUTO: 6.3 THOUSAND/UL (ref 4.31–10.16)

## 2020-12-15 PROCEDURE — 83721 ASSAY OF BLOOD LIPOPROTEIN: CPT

## 2020-12-15 PROCEDURE — 82043 UR ALBUMIN QUANTITATIVE: CPT | Performed by: INTERNAL MEDICINE

## 2020-12-15 PROCEDURE — 84478 ASSAY OF TRIGLYCERIDES: CPT

## 2020-12-15 PROCEDURE — 82570 ASSAY OF URINE CREATININE: CPT | Performed by: INTERNAL MEDICINE

## 2020-12-15 PROCEDURE — 87389 HIV-1 AG W/HIV-1&-2 AB AG IA: CPT

## 2020-12-15 PROCEDURE — 36415 COLL VENOUS BLD VENIPUNCTURE: CPT

## 2020-12-15 PROCEDURE — 85025 COMPLETE CBC W/AUTO DIFF WBC: CPT

## 2020-12-15 PROCEDURE — 3061F NEG MICROALBUMINURIA REV: CPT | Performed by: INTERNAL MEDICINE

## 2020-12-15 PROCEDURE — 84443 ASSAY THYROID STIM HORMONE: CPT

## 2020-12-15 PROCEDURE — 80053 COMPREHEN METABOLIC PANEL: CPT

## 2020-12-15 PROCEDURE — G0103 PSA SCREENING: HCPCS

## 2020-12-16 ENCOUNTER — OFFICE VISIT (OUTPATIENT)
Dept: INTERNAL MEDICINE CLINIC | Facility: CLINIC | Age: 50
End: 2020-12-16
Payer: COMMERCIAL

## 2020-12-16 VITALS
HEIGHT: 71 IN | TEMPERATURE: 97.5 F | BODY MASS INDEX: 39.41 KG/M2 | HEART RATE: 68 BPM | OXYGEN SATURATION: 98 % | WEIGHT: 281.5 LBS | DIASTOLIC BLOOD PRESSURE: 80 MMHG | SYSTOLIC BLOOD PRESSURE: 128 MMHG

## 2020-12-16 DIAGNOSIS — E88.09 SERUM ALBUMIN DECREASED: ICD-10-CM

## 2020-12-16 DIAGNOSIS — E11.9 TYPE 2 DIABETES MELLITUS WITHOUT COMPLICATION, WITHOUT LONG-TERM CURRENT USE OF INSULIN (HCC): Primary | ICD-10-CM

## 2020-12-16 LAB — HIV 1+2 AB+HIV1 P24 AG SERPL QL IA: NORMAL

## 2020-12-16 PROCEDURE — 3079F DIAST BP 80-89 MM HG: CPT | Performed by: INTERNAL MEDICINE

## 2020-12-16 PROCEDURE — 3074F SYST BP LT 130 MM HG: CPT | Performed by: INTERNAL MEDICINE

## 2020-12-16 PROCEDURE — 3008F BODY MASS INDEX DOCD: CPT | Performed by: INTERNAL MEDICINE

## 2020-12-16 PROCEDURE — 1036F TOBACCO NON-USER: CPT | Performed by: INTERNAL MEDICINE

## 2020-12-16 PROCEDURE — 99213 OFFICE O/P EST LOW 20 MIN: CPT | Performed by: INTERNAL MEDICINE

## 2021-01-11 ENCOUNTER — OFFICE VISIT (OUTPATIENT)
Dept: INTERNAL MEDICINE CLINIC | Facility: CLINIC | Age: 51
End: 2021-01-11
Payer: COMMERCIAL

## 2021-01-11 VITALS
OXYGEN SATURATION: 99 % | TEMPERATURE: 96.9 F | WEIGHT: 282.38 LBS | DIASTOLIC BLOOD PRESSURE: 78 MMHG | HEART RATE: 70 BPM | SYSTOLIC BLOOD PRESSURE: 122 MMHG | BODY MASS INDEX: 39.53 KG/M2 | HEIGHT: 71 IN

## 2021-01-11 DIAGNOSIS — G51.0 BELL'S PALSY: Primary | ICD-10-CM

## 2021-01-11 PROCEDURE — 99213 OFFICE O/P EST LOW 20 MIN: CPT | Performed by: INTERNAL MEDICINE

## 2021-01-11 PROCEDURE — 1036F TOBACCO NON-USER: CPT | Performed by: INTERNAL MEDICINE

## 2021-01-11 PROCEDURE — 3725F SCREEN DEPRESSION PERFORMED: CPT | Performed by: INTERNAL MEDICINE

## 2021-01-11 RX ORDER — VALACYCLOVIR HYDROCHLORIDE 1 G/1
1000 TABLET, FILM COATED ORAL DAILY
Qty: 5 TABLET | Refills: 0 | Status: SHIPPED | OUTPATIENT
Start: 2021-01-11 | End: 2021-04-22

## 2021-01-11 RX ORDER — PREDNISONE 10 MG/1
TABLET ORAL
Qty: 90 TABLET | Refills: 0 | Status: SHIPPED | OUTPATIENT
Start: 2021-01-11 | End: 2021-04-22

## 2021-01-11 NOTE — PATIENT INSTRUCTIONS
Givens Palsy   WHAT YOU NEED TO KNOW:   Bell palsy is a sudden weakness or paralysis of one side of your face  Bell palsy occurs when the nerve that controls the muscles in your face becomes swollen or irritated  DISCHARGE INSTRUCTIONS:   Medicines:   · Medicine may be given  to decrease swelling and irritation of your facial nerve  You may receive antiviral medicine if your healthcare provider thinks a virus caused your Bell palsy  Your healthcare provider may also suggest acetaminophen or ibuprofen to reduce pain  These medicines are available without a doctor's order  Ask which medicine to take, and how much you need  Follow directions  Acetaminophen can cause liver damage, and ibuprofen can cause stomach bleeding or kidney damage  · Take your medicine as directed  Contact your healthcare provider if you think your medicine is not helping or if you have side effects  Tell him if you are allergic to any medicine  Keep a list of the medicines, vitamins, and herbs you take  Include the amounts, and when and why you take them  Bring the list or the pill bottles to follow-up visits  Carry your medicine list with you in case of an emergency  Follow up with your healthcare provider as directed:  Write down your questions so you remember to ask them during your visits  Eye care: Your healthcare provider may recommend that you use artificial tears during the day to keep your eye moist  You may need to use an eye ointment at night  You may also need to wear a patch over your eye and tape it shut while you sleep  This helps keep your eye from getting dry and infected  Wear sunglasses to protect your eye from direct sunlight  Stay away from places that have fumes, dust, or other particles in the air that may harm your eye  Physical therapy:  A physical therapist may teach you how to massage your face and exercise the nerves and muscles in your face   This may help you get better sooner and prevent long-term problems  You can exercise on your own when your facial movement begins to return  Open and close your eye, wink, and smile wide  Do the exercises for 15 or 20 minutes several times a day  Contact your healthcare provider if:   · You have a fever  · Your eye becomes red, irritated, or painful  · You have questions or concerns about your condition or care  Return to the emergency department if:   · You develop weakness or numbness on one side of your body (other than your face)  · You have double vision, or you lose vision in your eye  · You have trouble thinking clearly  © Copyright [address] Information is for End User's use only and may not be sold, redistributed or otherwise used for commercial purposes  All illustrations and images included in CareNotes® are the copyrighted property of [employer]  or [facility] Celine Rossi  The above information is an  only  It is not intended as medical advice for individual conditions or treatments  Talk to your doctor, nurse or pharmacist before following any medical regimen to see if it is safe and effective for you

## 2021-01-11 NOTE — PROGRESS NOTES
BMI Counseling: Body mass index is 39 38 kg/m²  The BMI is above normal  Nutrition recommendations include decreasing portion sizes and encouraging healthy choices of fruits and vegetables  Exercise recommendations include moderate physical activity 150 minutes/week  No pharmacotherapy was ordered  Assessment/Plan:  Problem List Items Addressed This Visit     None      Visit Diagnoses     Bell's palsy    -  Primary    Relevant Medications    valACYclovir (VALTREX) 1,000 mg tablet    predniSONE 10 mg tablet           Diagnoses and all orders for this visit:    Bell's palsy  -     valACYclovir (VALTREX) 1,000 mg tablet; Take 1 tablet (1,000 mg total) by mouth daily for 5 days  -     predniSONE 10 mg tablet; 60mg po q d x 5, then 50mg po q d x 5, then 40mg po q d x 5, then 30mg po q d x 5, 20mg q day x 5 and 10mg q day x 5 then d/c  No problem-specific Assessment & Plan notes found for this encounter  A/P: suspect Givens's  No CNS deficits otherwise  Will start antiviral and steroids  Watch sugars  Hold on imaging and labs for now  Call with any changes otherwise RTC one week for f/u  Subjective:      Patient ID: Jeimy Benitez is a 52 y o  male  WM with prior Givens's Palsey, presents with acute onset several days ago of similar s/s  No recent illness, but notes some nasal congestion and cold sores  Left facial weakness  No fever or chills  NO slurred speech, paresthesia, visual changes, etc  Slight neck pain  No mental status change  The following portions of the patient's history were reviewed and updated as appropriate:   He has a past medical history of Diabetes mellitus ([address] ), Hypertension, and Kidney stones  ,  does not have any pertinent problems on file  ,   has a past surgical history that includes Neck surgery and Cholecystectomy  ,  family history includes Breast cancer in his mother; Diabetes in his father, mother, and sister; Fibromyalgia in his sister;  Hypertension in his mother  ,   reports that he has never smoked  He has never used smokeless tobacco  He reports that he does not drink alcohol or use drugs  ,  is allergic to codeine; lisinopril; metformin; and penicillins     Current Outpatient Medications   Medication Sig Dispense Refill    Ascorbic Acid (VITAMIN C) 1000 MG tablet Take 1,000 mg by mouth daily      atorvastatin (LIPITOR) 20 mg tablet Take 1 tablet (20 mg total) by mouth daily 90 tablet 3    Stacey Microlet Lancets lancets Use as instructed 100 each 1    Blood Glucose Monitoring Suppl (CorMatrix CONTOUR MONITOR) TRACEY by Does not apply route daily 1 Device 0    Cholecalciferol (VITAMIN D3) 124505 UNIT/GM POWD by Does not apply route      glimepiride (AMARYL) 4 mg tablet Two po q day  180 tablet 0    glucose blood (Contour Next Test) test strip Check blood glucose  each 1    metoprolol succinate (TOPROL-XL) 100 mg 24 hr tablet Take 0 5 tablets (50 mg total) by mouth daily 90 tablet 1    predniSONE 10 mg tablet 60mg po q d x 5, then 50mg po q d x 5, then 40mg po q d x 5, then 30mg po q d x 5, 20mg q day x 5 and 10mg q day x 5 then d/c  90 tablet 0    valACYclovir (VALTREX) 1,000 mg tablet Take 1 tablet (1,000 mg total) by mouth daily for 5 days 5 tablet 0     No current facility-administered medications for this visit  Review of Systems   Constitutional: Negative for activity change, chills, diaphoresis, fatigue and fever  HENT: Negative  Eyes: Negative for visual disturbance  Respiratory: Negative for cough, chest tightness, shortness of breath and wheezing  Cardiovascular: Negative for chest pain, palpitations and leg swelling  Gastrointestinal: Negative for abdominal pain, constipation, diarrhea, nausea and vomiting  Genitourinary: Negative for difficulty urinating, dysuria and frequency  Musculoskeletal: Negative for arthralgias, gait problem and myalgias  Neurological: Positive for facial asymmetry   Negative for dizziness, seizures, syncope, speech difficulty, weakness, light-headedness and headaches  Psychiatric/Behavioral: Negative for confusion  The patient is not nervous/anxious  PHQ-9 Depression Screening    PHQ-9:   Frequency of the following problems over the past two weeks:      Little interest or pleasure in doing things: 0 - not at all  Feeling down, depressed, or hopeless: 0 - not at all  PHQ-2 Score: 0        Objective:  Vitals:    01/11/21 0849   BP: 122/78   BP Location: Left arm   Patient Position: Sitting   Cuff Size: Standard   Pulse: 70   Temp: (!) 96 9 °F (36 1 °C)   TempSrc: Tympanic   SpO2: 99%   Weight: 128 kg (282 lb 6 oz)   Height: 5' 11" (1 803 m)     Body mass index is 39 38 kg/m²  Physical Exam  Vitals signs and nursing note reviewed  Constitutional:       General: He is not in acute distress  Appearance: Normal appearance  He is not ill-appearing  HENT:      Head: Normocephalic and atraumatic  Right Ear: Tympanic membrane, ear canal and external ear normal  There is impacted cerumen  Left Ear: Tympanic membrane, ear canal and external ear normal       Mouth/Throat:      Mouth: Mucous membranes are moist       Comments: Several scabbed lesions perioral area  Eyes:      Extraocular Movements: Extraocular movements intact  Conjunctiva/sclera: Conjunctivae normal       Pupils: Pupils are equal, round, and reactive to light  Cardiovascular:      Rate and Rhythm: Regular rhythm  Heart sounds: Normal heart sounds  Pulmonary:      Effort: Pulmonary effort is normal  No respiratory distress  Breath sounds: Normal breath sounds  No wheezing or rales  Neurological:      General: No focal deficit present  Mental Status: He is alert and oriented to person, place, and time  Mental status is at baseline  Cranial Nerves: No cranial nerve deficit  Sensory: No sensory deficit  Motor: No weakness        Coordination: Coordination normal       Gait: Gait normal       Deep Tendon Reflexes: Reflexes normal       Comments: Left facial weakness         BMI Counseling: Body mass index is 39 38 kg/m²  The BMI is above normal  Nutrition recommendations include reducing portion sizes, decreasing overall calorie intake, reducing intake of saturated fat and trans fat and reducing intake of cholesterol  Exercise recommendations include moderate aerobic physical activity for 150 minutes/week

## 2021-01-20 ENCOUNTER — OFFICE VISIT (OUTPATIENT)
Dept: INTERNAL MEDICINE CLINIC | Facility: CLINIC | Age: 51
End: 2021-01-20
Payer: COMMERCIAL

## 2021-01-20 VITALS
SYSTOLIC BLOOD PRESSURE: 120 MMHG | BODY MASS INDEX: 38.53 KG/M2 | DIASTOLIC BLOOD PRESSURE: 80 MMHG | HEART RATE: 61 BPM | WEIGHT: 275.25 LBS | TEMPERATURE: 97.2 F | HEIGHT: 71 IN | OXYGEN SATURATION: 98 %

## 2021-01-20 DIAGNOSIS — G51.0 BELL'S PALSY: Primary | ICD-10-CM

## 2021-01-20 DIAGNOSIS — E11.9 TYPE 2 DIABETES MELLITUS WITHOUT COMPLICATION, WITHOUT LONG-TERM CURRENT USE OF INSULIN (HCC): ICD-10-CM

## 2021-01-20 PROCEDURE — 3008F BODY MASS INDEX DOCD: CPT | Performed by: INTERNAL MEDICINE

## 2021-01-20 PROCEDURE — 99213 OFFICE O/P EST LOW 20 MIN: CPT | Performed by: INTERNAL MEDICINE

## 2021-01-20 NOTE — PROGRESS NOTES
Assessment/Plan:  Problem List Items Addressed This Visit        Endocrine    Type 2 diabetes mellitus without complication ([facility] [address] )      Other Visit Diagnoses     Bell's palsy    -  Primary           Diagnoses and all orders for this visit:    Bell's palsy    Type 2 diabetes mellitus without complication, without long-term current use of insulin ([facility] [address] )        No problem-specific Assessment & Plan notes found for this encounter  A/P: Doing better and PE much improved  Expect sugars to improve as the steroids wean  Will decrease the wean at a faster rate  Continue with increase fluids  Continue current treatment otherwise  RTC as scheduled  Subjective:      Patient ID: Jayme Laureano is a 52 y o  male  WM RTC for f/u suspected recurrence of Bell's Palsy  Started on steroids and anti-viral  Doing ok and no new issues  Sugars are high,but just over 200  Jeannetta Given Denies headache, visual changes, slurred speech, paralysis, or paresthesia  Facial weakness is better and just some slight visual dryness  No new c/o's  The following portions of the patient's history were reviewed and updated as appropriate:   He has a past medical history of Diabetes mellitus ([facility] [address] ), Hypertension, and Kidney stones  ,  does not have any pertinent problems on file  ,   has a past surgical history that includes Neck surgery and Cholecystectomy  ,  family history includes Breast cancer in his mother; Diabetes in his father, mother, and sister; Fibromyalgia in his sister; Hypertension in his mother  ,   reports that he has never smoked  He has never used smokeless tobacco  He reports that he does not drink alcohol or use drugs  ,  is allergic to codeine; lisinopril; metformin; and penicillins     Current Outpatient Medications   Medication Sig Dispense Refill    Ascorbic Acid (VITAMIN C) 1000 MG tablet Take 1,000 mg by mouth daily      atorvastatin (LIPITOR) 20 mg tablet Take 1 tablet (20 mg total) by mouth daily 90 tablet 3    Cholecalciferol (VITAMIN D3) 941981 UNIT/GM POWD by Does not apply route      glimepiride (AMARYL) 4 mg tablet Two po q day  180 tablet 0    metoprolol succinate (TOPROL-XL) 100 mg 24 hr tablet Take 0 5 tablets (50 mg total) by mouth daily 90 tablet 1    predniSONE 10 mg tablet 60mg po q d x 5, then 50mg po q d x 5, then 40mg po q d x 5, then 30mg po q d x 5, 20mg q day x 5 and 10mg q day x 5 then d/c  90 tablet 0    Stacey Microlet Lancets lancets Use as instructed 100 each 1    Blood Glucose Monitoring Suppl (Wave - Private Location App CONTOUR MONITOR) TRACEY by Does not apply route daily 1 Device 0    glucose blood (Contour Next Test) test strip Check blood glucose  each 1    valACYclovir (VALTREX) 1,000 mg tablet Take 1 tablet (1,000 mg total) by mouth daily for 5 days 5 tablet 0     No current facility-administered medications for this visit  Review of Systems   Constitutional: Negative for activity change, chills, diaphoresis, fatigue and fever  HENT: Negative  Eyes: Negative for visual disturbance  Respiratory: Negative for cough, chest tightness, shortness of breath and wheezing  Cardiovascular: Negative for chest pain, palpitations and leg swelling  Gastrointestinal: Negative for abdominal pain, constipation, diarrhea, nausea and vomiting  Genitourinary: Negative for difficulty urinating, dysuria and frequency  Musculoskeletal: Negative for arthralgias, gait problem and myalgias  Neurological: Negative for dizziness, seizures, syncope, weakness, light-headedness and headaches  Psychiatric/Behavioral: Negative for confusion, dysphoric mood and sleep disturbance  The patient is not nervous/anxious          PHQ-9 Depression Screening    PHQ-9:   Frequency of the following problems over the past two weeks:            Objective:  Vitals:    01/20/21 1044   BP: 120/80   Pulse: 61   Temp: (!) 97 2 °F (36 2 °C)   SpO2: 98%   Weight: 125 kg (275 lb 4 oz)   Height: 5' 11" (1 803 m)     Body mass index is 38 39 kg/m²  Physical Exam  Vitals signs and nursing note reviewed  Constitutional:       General: He is not in acute distress  Appearance: Normal appearance  He is not ill-appearing  HENT:      Head: Normocephalic and atraumatic  Mouth/Throat:      Mouth: Mucous membranes are moist    Eyes:      Extraocular Movements: Extraocular movements intact  Conjunctiva/sclera: Conjunctivae normal       Pupils: Pupils are equal, round, and reactive to light  Cardiovascular:      Rate and Rhythm: Normal rate and regular rhythm  Heart sounds: Normal heart sounds  Pulmonary:      Effort: Pulmonary effort is normal  No respiratory distress  Breath sounds: No wheezing or rales  Neurological:      General: No focal deficit present  Mental Status: He is alert and oriented to person, place, and time  Mental status is at baseline  Cranial Nerves: No cranial nerve deficit  Motor: Weakness present  Coordination: Coordination normal       Gait: Gait normal    Psychiatric:         Mood and Affect: Mood normal          Behavior: Behavior normal          Thought Content:  Thought content normal          Judgment: Judgment normal

## 2021-01-20 NOTE — PATIENT INSTRUCTIONS
[place] Palsy   WHAT YOU NEED TO KNOW:   What is Bell palsy? Bell palsy is a sudden weakness or paralysis of one side of your face  Bell palsy occurs when the nerve that controls the muscles in your face becomes swollen or irritated  What causes Bell palsy? It is not known for sure what causes the facial nerve swelling that leads to Bell palsy  The herpes simplex virus may cause nerve swelling  Viruses that cause colds and the flu may increase your risk for Bell palsy  Your risk is also higher if you are pregnant or have diabetes  What are the signs and symptoms of Bell palsy? You may notice pain around an ear 1 or 2 days before you have other symptoms  A few days later, weakness may appear on the same side of your face as the ear pain  The symptoms often increase over the following several days  Bell palsy usually lasts about 2 to 3 weeks, but it can last several months  You may have any of the following:  · Weakness or paralysis on one side of your face    · Drooping of one side of your face    · Trouble closing your eye on the affected side of your face    · More sensitive hearing on the affected side    · Numbness or pain in your ear, tongue, or face    · A decrease in the amount of tears and saliva you make    · No ability to taste with the front part of your tongue    · Problems chewing food    How is Bell palsy diagnosed? Your healthcare provider will test how well you can move different parts of your face  He may also do an electromyography (EMG)  This test measures the electrical activity of your muscles  An EMG also tests the nerves that control muscles  How is Bell palsy treated? Bell palsy often goes away without treatment  Some treatments may help you get better faster or help prevent other problems caused by Bell palsy  · Eye care  may be needed if you have problems blinking or closing your eye   Use artificial tears during the day to keep your eye moist  Your healthcare provider may ask that you use an eye ointment at night  You may also need to wear a patch over your eye and tape it shut while you sleep  This helps to keep your eye from getting dry and infected  Wear sunglasses to protect your eye from direct sunlight  Stay away from places that have fumes, dust, or other particles in the air that may harm your eye  · Medicine may be given  to decrease swelling and irritation of your facial nerve  You may receive antiviral medicine if your healthcare provider thinks a virus caused your Bell palsy  Your healthcare provider may also suggest acetaminophen or ibuprofen to reduce pain  These medicines are available without a doctor's order  Ask which medicine to take, and how much you need  Follow directions  Acetaminophen can cause liver damage, and ibuprofen can cause stomach bleeding or kidney damage  · Physical therapy  may be recommended by your healthcare provider  A physical therapist can teach you how to massage your face and exercise the nerves and muscles in your face  This may help you get better sooner and prevent long-term problems  You can exercise on your own when your facial movement begins to return  Open and close your eye, wink, and smile wide  Do the exercises for 15 or 20 minutes several times a day  When should I contact my healthcare provider? · You have a fever  · Your eye becomes red, irritated, or painful  · You have questions or concerns about your condition or care  When should I seek immediate care or call 911? · You develop weakness or numbness on one side of your body (other than your face)  · You have double vision, or you lose vision in your eye  · You have trouble thinking clearly  CARE AGREEMENT:   You have the right to help plan your care  Learn about your health condition and how it may be treated  Discuss treatment options with your healthcare providers to decide what care you want to receive   You always have the right to refuse treatment  The above information is an  only  It is not intended as medical advice for individual conditions or treatments  Talk to your doctor, nurse or pharmacist before following any medical regimen to see if it is safe and effective for you  © Copyright [address] Information is for End User's use only and may not be sold, redistributed or otherwise used for commercial purposes   All illustrations and images included in CareNotes® are the copyrighted property of A D A M , Inc  or [address] [place] [address]

## 2021-02-03 DIAGNOSIS — G51.0 BELL'S PALSY: ICD-10-CM

## 2021-02-03 RX ORDER — PREDNISONE 10 MG/1
TABLET ORAL
Qty: 90 TABLET | Refills: 0 | OUTPATIENT
Start: 2021-02-03

## 2021-02-12 ENCOUNTER — TELEPHONE (OUTPATIENT)
Dept: INTERNAL MEDICINE CLINIC | Facility: CLINIC | Age: 51
End: 2021-02-12

## 2021-02-12 NOTE — TELEPHONE ENCOUNTER
Pt's mother called stating that Glimepiride  Pt was taking 2 mg BID and was changed to 4 mg  Should pt be taking 1 tablet daily or BID?

## 2021-02-17 ENCOUNTER — OFFICE VISIT (OUTPATIENT)
Dept: INTERNAL MEDICINE CLINIC | Facility: CLINIC | Age: 51
End: 2021-02-17
Payer: COMMERCIAL

## 2021-02-17 VITALS
SYSTOLIC BLOOD PRESSURE: 122 MMHG | BODY MASS INDEX: 40.04 KG/M2 | DIASTOLIC BLOOD PRESSURE: 80 MMHG | HEART RATE: 82 BPM | OXYGEN SATURATION: 96 % | WEIGHT: 286 LBS | TEMPERATURE: 98.5 F | RESPIRATION RATE: 18 BRPM | HEIGHT: 71 IN

## 2021-02-17 DIAGNOSIS — E11.9 TYPE 2 DIABETES MELLITUS WITHOUT COMPLICATION, WITHOUT LONG-TERM CURRENT USE OF INSULIN (HCC): Primary | ICD-10-CM

## 2021-02-17 DIAGNOSIS — D69.6 THROMBOCYTOPENIA (HCC): ICD-10-CM

## 2021-02-17 DIAGNOSIS — E78.2 MIXED HYPERLIPIDEMIA: ICD-10-CM

## 2021-02-17 DIAGNOSIS — L98.9 SKIN LESION OF SCALP: ICD-10-CM

## 2021-02-17 DIAGNOSIS — I10 ESSENTIAL HYPERTENSION: ICD-10-CM

## 2021-02-17 DIAGNOSIS — M50.30 DDD (DEGENERATIVE DISC DISEASE), CERVICAL: ICD-10-CM

## 2021-02-17 LAB — SL AMB POCT HEMOGLOBIN AIC: 9.8 (ref ?–6.5)

## 2021-02-17 PROCEDURE — 83036 HEMOGLOBIN GLYCOSYLATED A1C: CPT | Performed by: INTERNAL MEDICINE

## 2021-02-17 PROCEDURE — 3046F HEMOGLOBIN A1C LEVEL >9.0%: CPT | Performed by: INTERNAL MEDICINE

## 2021-02-17 PROCEDURE — 99214 OFFICE O/P EST MOD 30 MIN: CPT | Performed by: INTERNAL MEDICINE

## 2021-02-17 NOTE — PROGRESS NOTES
Assessment/Plan:  Problem List Items Addressed This Visit        Endocrine    Type 2 diabetes mellitus without complication ([facility] [address] ) - Primary    Relevant Medications    Empagliflozin 25 MG TABS    Other Relevant Orders    POCT hemoglobin A1c (Completed)       Cardiovascular and Mediastinum    Essential hypertension       Musculoskeletal and Integument    DDD (degenerative disc disease), cervical    Relevant Orders    Ambulatory referral to Physical Therapy    Ambulatory referral to Pain Management       Other    Mixed hyperlipidemia    Thrombocytopenia (HCC)      Other Visit Diagnoses     Skin lesion of scalp        Relevant Orders    Ambulatory referral to Dermatology           Diagnoses and all orders for this visit:    Type 2 diabetes mellitus without complication, without long-term current use of insulin (HCC)  -     POCT hemoglobin A1c  -     Empagliflozin 25 MG TABS; Take 1 tablet (25 mg total) by mouth every morning    Essential hypertension    DDD (degenerative disc disease), cervical  -     Ambulatory referral to Physical Therapy; Future  -     Ambulatory referral to Pain Management; Future    Mixed hyperlipidemia    Thrombocytopenia (HCC)    Skin lesion of scalp  -     Ambulatory referral to Dermatology; Future        No problem-specific Assessment & Plan notes found for this encounter  A/P: Doing well and in office HgA1c was 9 8 and is not at goal  Didn't tolerate metformin  Will add a SGLT2  Concerned about the scalp lesion not healing and will refer to derm  In regards to the disability request, will refer to PT and pain management to see if treatment may improve his status    Discussed BMI and will give information on diet and exercise  Continue current treatment and RTC three months for routine  Subjective:      Patient ID: Alisia Holguin is a 52 y o  male  WM RTC for f/u DM, HTN, etc  Doing well and no new issues,but reports a scalp lesion that is not healing for several months   No h/o skin cancer    Remains active w/o difficulty and no falls  Sugars less than 200 and no low sugar events    Bell's improving with pt now off the steroids    Chronic pain is manageable, but wants to apply for disability    No bleeding events reported  Due for labs  The following portions of the patient's history were reviewed and updated as appropriate:   He has a past medical history of Diabetes mellitus ([address] ), Hypertension, and Kidney stones  ,  does not have any pertinent problems on file  ,   has a past surgical history that includes Neck surgery and Cholecystectomy  ,  family history includes Breast cancer in his mother; Diabetes in his father, mother, and sister; Fibromyalgia in his sister; Hypertension in his mother  ,   reports that he has never smoked  He has never used smokeless tobacco  He reports that he does not drink alcohol or use drugs  ,  is allergic to codeine; lisinopril; metformin; and penicillins     Current Outpatient Medications   Medication Sig Dispense Refill    Ascorbic Acid (VITAMIN C) 1000 MG tablet Take 1,000 mg by mouth daily      atorvastatin (LIPITOR) 20 mg tablet Take 1 tablet (20 mg total) by mouth daily 90 tablet 3    Stacey Microlet Lancets lancets Use as instructed 100 each 1    Blood Glucose Monitoring Suppl (Eniram CONTOUR MONITOR) TRACEY by Does not apply route daily 1 Device 0    Cholecalciferol (VITAMIN D3) 918452 UNIT/GM POWD by Does not apply route      glimepiride (AMARYL) 4 mg tablet Two po q day   180 tablet 0    glucose blood (Contour Next Test) test strip Check blood glucose  each 1    metoprolol succinate (TOPROL-XL) 100 mg 24 hr tablet Take 0 5 tablets (50 mg total) by mouth daily 90 tablet 1    predniSONE 10 mg tablet 60mg po q d x 5, then 50mg po q d x 5, then 40mg po q d x 5, then 30mg po q d x 5, 20mg q day x 5 and 10mg q day x 5 then d/c  90 tablet 0    Empagliflozin 25 MG TABS Take 1 tablet (25 mg total) by mouth every morning 90 tablet 1    valACYclovir (VALTREX) 1,000 mg tablet Take 1 tablet (1,000 mg total) by mouth daily for 5 days 5 tablet 0     No current facility-administered medications for this visit  Review of Systems   Constitutional: Negative for activity change, chills, diaphoresis, fatigue and fever  HENT: Negative  Eyes: Negative for visual disturbance  Respiratory: Negative for cough, chest tightness, shortness of breath and wheezing  Cardiovascular: Negative for chest pain, palpitations and leg swelling  Gastrointestinal: Negative for abdominal pain, constipation, diarrhea, nausea and vomiting  Endocrine: Negative for cold intolerance and heat intolerance  Genitourinary: Negative for difficulty urinating, dysuria and frequency  Musculoskeletal: Positive for back pain and gait problem  Negative for arthralgias and myalgias  Skin: Positive for wound  Neurological: Negative for dizziness, light-headedness and headaches  Hematological: Does not bruise/bleed easily  Psychiatric/Behavioral: Negative for confusion, dysphoric mood and sleep disturbance  The patient is not nervous/anxious  PHQ-9 Depression Screening    PHQ-9:   Frequency of the following problems over the past two weeks:            Objective:  Vitals:    02/17/21 0929   BP: 122/80   BP Location: Right arm   Patient Position: Sitting   Cuff Size: Large   Pulse: 82   Resp: 18   Temp: 98 5 °F (36 9 °C)   TempSrc: Temporal   SpO2: 96%   Weight: 130 kg (286 lb)   Height: 5' 11" (1 803 m)     Body mass index is 39 89 kg/m²  Physical Exam  Vitals signs and nursing note reviewed  Constitutional:       General: He is not in acute distress  Appearance: Normal appearance  He is not ill-appearing  HENT:      Head: Normocephalic and atraumatic  Mouth/Throat:      Mouth: Mucous membranes are moist    Eyes:      Extraocular Movements: Extraocular movements intact        Conjunctiva/sclera: Conjunctivae normal       Pupils: Pupils are equal, round, and reactive to light  Neck:      Musculoskeletal: Neck supple  Vascular: No carotid bruit  Cardiovascular:      Rate and Rhythm: Normal rate and regular rhythm  Heart sounds: Normal heart sounds  Pulmonary:      Effort: Pulmonary effort is normal  No respiratory distress  Breath sounds: Normal breath sounds  No wheezing or rales  Abdominal:      General: Bowel sounds are normal  There is no distension  Palpations: Abdomen is soft  Tenderness: There is no abdominal tenderness  Musculoskeletal:      Right lower leg: No edema  Left lower leg: No edema  Skin:     Comments: Left occipital area with a scabbed flesh colored keratotic lesion  No s/s of infection  Neurological:      General: No focal deficit present  Mental Status: He is alert and oriented to person, place, and time  Mental status is at baseline  Psychiatric:         Mood and Affect: Mood normal          Behavior: Behavior normal          Thought Content:  Thought content normal          Judgment: Judgment normal

## 2021-02-17 NOTE — PATIENT INSTRUCTIONS
Type 2 Diabetes in the Older Adult   WHAT YOU NEED TO KNOW:   The risk for type 2 diabetes increases as a person gets older  Type 2 diabetes means your pancreas does not make enough insulin, or your body does not use insulin well  Insulin helps move sugar out of the blood so it can be used for energy  Diabetes cannot be cured, but it can be managed  DISCHARGE INSTRUCTIONS:   Call or have someone close to you call your local emergency number (911 in the [address] [facility]) for any of the following:   · You have any of the following signs of a stroke:      ? Numbness or drooping on one side of your face     ? Weakness in an arm or leg    ? Confusion or difficulty speaking    ? Dizziness, a severe headache, or vision loss    · You have any of the following signs of a heart attack:      ? Squeezing, pressure, or pain in your chest    ? You may  also have any of the following:     § Discomfort or pain in your back, neck, jaw, stomach, or arm    § Shortness of breath    § Nausea or vomiting    § Lightheadedness or a sudden cold sweat    Return to the emergency department if:   · Your blood sugar level is higher than your goal and does not come down with treatment  · You have signs of a high blood sugar level, such as blurred or double vision  · You have signs of a high ketone level, such as fruity, sweet smelling breath, or shallow breathing  · You have symptoms of a low blood sugar level, such as trouble thinking, sweating, or a pounding heartbeat  · Your blood sugar level is lower than normal and does not improve with treatment  Call your doctor or diabetes care team if:   · You are vomiting or have diarrhea  · You have an upset stomach and cannot eat the foods on your meal plan  · You feel weak or more tired than usual     · You feel dizzy, have headaches, or are easily irritated  · Your skin is red, warm, dry, or swollen      · You have a wound that does not heal     · You have numbness in your arms or legs     · You have trouble coping with diabetes, or you feel anxious or depressed  · You have problems with your memory  · You have changes in your vision  · You have questions or concerns about your condition or care  Manage diabetes and prevent problems:  Sometimes type 2 diabetes can be managed with changes in nutrition and physical activity  · Work with your diabetes care team to create plans to meet your needs  Your diabetes care team may include a physician, nurse practitioner, and physician assistant  It may also include a diabetes nurse educator, dietitian, and an exercise specialist  Family members, or others who are close to you, may also be part of the team  You and your team will make goals and plans to manage diabetes and other health problems  For example, the plan will include how to manage medicines you may take for diabetes and for other health conditions  The plans and goals will be specific to your needs and abilities  Your plan will change as your needs and abilities change  · Manage other health issues as directed  Health issues may include high blood pressure, high cholesterol levels, and heart problems  Health issues may also include depression  Together you and your care team can create a plan to manage any other health issues  · Try to be physically active for 30 to 60 minutes most days of the week  Physical activity, such as exercise, helps keep your blood sugar level steady and lowers your risk for heart disease  Physical activity can help improve your balance and strength and lower your risk for falls  Start slowly  Activity can be done in 10-minute intervals  ? Set a goal for 30 minutes of aerobic activity at least 5 times a week  Aerobic activity helps your heart stay strong  Aerobic activity includes walking, bicycling, dancing, swimming, and raking leaves  ? Set a goal for strength training 2 times a week    Strength training helps you keep the muscles you have and build new muscles  Strength training includes lifting weights, climbing stairs, and doing yoga or stewart chi     ? Stay steady on your on your feet with balancing activities  These include walking backwards, standing on one foot, and walking heel to toe in a straight line  · Maintain a healthy weight  Ask your provider what a healthy weight is for you  A healthy weight can help you control diabetes and prevent heart disease  Ask your provider to help you create a weight loss plan if you are overweight  Weight loss of 10 to 15 pounds can help make a difference in managing diabetes  Together you and your care team can set manageable weight loss goals  · Know the risks if you choose to drink alcohol  Alcohol can cause your blood sugar levels to be low if you use insulin  Alcohol can cause high blood sugar levels and weight gain if you drink too much  Women 21 years or older and men 72 years or older should limit alcohol to 1 drink a day  Men 21 to 64 years should limit alcohol to 2 drinks a day  A drink of alcohol is 12 ounces of beer, 5 ounces of wine, or 1½ ounces of liquor  · Do not smoke  Nicotine and other chemicals in cigarettes can cause lung disease and other health problems  It can also cause blood vessel damage that makes diabetes more difficult to manage  Ask your healthcare provider for information if you currently smoke and need help to quit  Do not use e-cigarettes or smokeless tobacco in place of cigarettes or to help you quit  They still contain nicotine  Diabetes education:  Diabetes education will start right away  Members of your care team teach you, your family, and caregivers the following:  · How to check your blood sugar level: You will learn when to check your blood sugar level and what the level should be  You will learn what to do if your level is too high or too low  Write down the times of your checks and your levels   Take them to all follow-up appointments  · About diabetes medicine: You and your family members will be taught how to draw up and give insulin, if needed  You will learn how much insulin you need and what time to inject insulin  You will be taught when not to give insulin  Your team will also teach you how to dispose of needles and syringes  If you need oral diabetes medicine, you will be taught about side effects  You will also be taught when to take or not take the medicine  · About nutrition:  A dietitian will help you make a meal plan to keep your blood sugar level steady  You will learn how food affects your blood sugar levels  You will also learn to keep track of sugar and starchy foods (carbohydrates)  Do not skip meals  Your blood sugar level may drop too low if you have taken insulin and do not eat  · How to prevent complications:  Diabetes that is not well controlled can lead to health problems  Examples include foot sores, retinopathy (vision loss), and peripheral neuropathy (loss of feeling in your hands and feet)  Your team will help you know when to get regular checkups, such as vision checks  They will teach you how to watch for problems and when to get a problem checked  Other ways to manage diabetes:   · Check your feet every day for sores  Look at your whole foot, including the bottom, and between and under your toes  Check for wounds, corns, and calluses  Use a mirror to see the bottom of your feet  The skin on your feet may be shiny, tight, dry, or darker than normal  Your feet may also be cold and pale  Feel your feet by running your hands along the tops, bottoms, sides, and between your toes  Redness, swelling, and warmth are signs of blood flow problems that can lead to a foot ulcer  Do not try to remove corns or calluses yourself  · Wear medical alert identification  Wear medical alert jewelry or carry a card that says you have type 2 diabetes   Ask your healthcare provider where to get these items          · Ask about vaccines  You have a higher risk for serious illness if you get the flu, pneumonia, or hepatitis  Ask your healthcare provider if you should get a flu, pneumonia, shingles, or hepatitis B vaccine, and when to get the vaccine  · Get help from family and friends  You may need help checking your blood sugar level, giving insulin injections, or preparing your meals  You may also need help to check your feet for sores  Ask your family and friends to help you with these tasks  Talk to your care team if you need someone at home to help you  Follow up with your doctor or diabetes care team as directed: You will need to return to meet with different care team members  You may need tests to monitor for problems  Write down your questions so you remember to ask them during your visits  © Copyright [address] Information is for End User's use only and may not be sold, redistributed or otherwise used for commercial purposes  All illustrations and images included in CareNotes® are the copyrighted property of A D A M , Inc  or [facility] Ceilne Rossi  The above information is an  only  It is not intended as medical advice for individual conditions or treatments  Talk to your doctor, nurse or pharmacist before following any medical regimen to see if it is safe and effective for you

## 2021-03-05 ENCOUNTER — EVALUATION (OUTPATIENT)
Dept: PHYSICAL THERAPY | Facility: HOME HEALTHCARE | Age: 51
End: 2021-03-05
Payer: COMMERCIAL

## 2021-03-05 DIAGNOSIS — M51.36 DDD (DEGENERATIVE DISC DISEASE), LUMBAR: ICD-10-CM

## 2021-03-05 PROCEDURE — 97161 PT EVAL LOW COMPLEX 20 MIN: CPT | Performed by: PHYSICAL THERAPIST

## 2021-03-05 PROCEDURE — 97110 THERAPEUTIC EXERCISES: CPT | Performed by: PHYSICAL THERAPIST

## 2021-03-05 NOTE — PROGRESS NOTES
PT Evaluation     Today's date: 3/5/2021  Patient name: Demetrius Singletary  : 1970  MRN: [id number]  Referring provider: Jennifer Wilkins DO  Dx:   Encounter Diagnosis     ICD-10-CM    1  DDD (degenerative disc disease), lumbar  M51 36 Ambulatory referral to Physical Therapy                  Assessment  Assessment details: Pt Katie Garcia is a 52 y o  who presents to OPPT with s/s consistent with chronic lumbar pain  PT notes pt with limited L/S ROM, decreased core and LE strength, postural dysfunction contributed to by soft tissue/mm restrictions, and increased pain with daily activities  Pt denies any radiating N/T into BLE at this time (mild numbness superior/inferior to L knee from prior incident)  Pt notes that he has trouble with bending and twisting, unable to tolerate lying on his stomach, difficulty with prolonged ambulating/standing, and disrupted sleeping  Pt would benefit from skilled therapy services to address outlined impairments, work towards goals, and restore pts PLOF  Thank you! Impairments: abnormal gait, abnormal muscle tone, abnormal or restricted ROM, abnormal movement, activity intolerance, impaired balance, impaired physical strength, lacks appropriate home exercise program, pain with function, weight-bearing intolerance and poor posture   Understanding of Dx/Px/POC: good   Prognosis: good    Goals  STGs to be achieved in 4 weeks:  -Pt to demonstrate reduced subjective pain rating "at worst" by at least 2-3 points from Initial Eval to allow for reduced pain with ADLs and improved functional activity tolerance    -Pt to demonstrate full AROM of L/S in order to maximize joint mobility and function and allow for progression of exercise program and achievement of goals    -Pt to demonstrate increased MMT of BLE by at least 1/2-1 grade in order to improve safety and stability with ADLs and functional mobility       LTGs to be achieved in 6-8 weeks:  -Pt will be I with HEP in order to continue to improve quality of life and independence and reduce risk for re-injury    -Pt to demonstrate return to activities of daily living without limiations or restrictions    -Pt to demonstrate ability to bend and lift > 10# without an increase in symptoms   -Pt to demonstrate improved function as noted by achieving or exceeding predicted score on FOTO outcomes assessment tool  Plan  Plan details: PT provided pt with detailed HEP program; pt verbalized understanding of program    Patient would benefit from: skilled physical therapy  Planned modality interventions: thermotherapy: hydrocollator packs  Planned therapy interventions: abdominal trunk stabilization, balance, neuromuscular re-education, manual therapy, home exercise program, functional ROM exercises, flexibility, therapeutic activities, therapeutic exercise, patient education and postural training  Frequency: 2x week  Duration in weeks: 4  Plan of Care beginning date: 3/5/2021  Plan of Care expiration date: 2021  Treatment plan discussed with: patient        Subjective Evaluation    History of Present Illness  Mechanism of injury: Pt reporting that he has been having lower back pain since he was younger  He notes that a few weeks ago he began to have an increased pain which he attributes to recent heavy shoveling following frequent snow storms  He went to see PCP who did complete updated imaging on the back (+) for DJD/DDD  He notes that he did go to see pain management who wanted to order an MRI but insurance denied until completion of 6 weeks of therapy  MD referral to OPPT for conservative management of s/s  He has no follow up with MD; PT advised to call and make an appt for 4-6 weeks out     Quality of life: good    Pain  At best pain ratin  At worst pain rating: 10  Quality: dull ache and tight  Relieving factors: medications and ice (OTC prn )  Aggravating factors: standing and walking  Progression: worsening    Social Support    Employment status: not working    Diagnostic Tests  X-ray: abnormal  Treatments  Current treatment: physical therapy  Patient Goals  Patient goals for therapy: increased strength, increased motion, decreased pain, independence with ADLs/IADLs and improved balance          Objective     Concurrent Complaints  Negative for bladder dysfunction and bowel dysfunction    Postural Observations  Seated posture: fair  Standing posture: fair    Additional Postural Observation Details  Decreased lumbar lordosis     Palpation   Left   Hypertonic in the erector spinae  Tenderness of the erector spinae  Right   Hypertonic in the erector spinae  Tenderness of the erector spinae  Active Range of Motion     Lumbar   Flexion:  Restriction level: minimal  Extension:  with pain Restriction level: maximal  Left lateral flexion:  Restriction level: moderate  Right lateral flexion:  Restriction level: moderate    Strength/Myotome Testing     Left Hip   Planes of Motion   Flexion: 3+  Abduction: 4-  Adduction: 3+    Right Hip   Planes of Motion   Flexion: 4  Abduction: 4  Adduction: 4    Left Knee   Flexion: 4-  Extension: 4-    Right Knee   Flexion: 4  Extension: 4    Tests     Lumbar     Left   Positive passive SLR  Negative crossed SLR  Right   Positive passive SLR  Negative crossed SLR       General Comments:    Lower quarter screen   Hips: unremarkable    Hip Comments   B LE ROM WFL       Flowsheet Rows      Most Recent Value   PT/OT G-Codes   Current Score  55   Projected Score  64               Re-eval Date: 4/2/21    Date 3/5       Visit Count 1       FOTO Completed              Precautions: chronic back pain       Manuals                                        Neuro Re-Ed         TA         TA + marches         TA + MTP/LTP        Palloff press                                 Ther Ex        NuStep  L1 10 minutes        LTR  HS stretch  Reviewed for HEP        SBB Reviewed for HEP       Standing hip flex/abd/ext         Standing marches         Squats with postural awareness         Step-ups         SLR        S/L SLR         Claeric        Hip add         Bridges         Cat and camel         Quad alt UE        Quad alt LE                 Ther Activity                        Gait Training                        Modalities

## 2021-03-10 ENCOUNTER — OFFICE VISIT (OUTPATIENT)
Dept: PHYSICAL THERAPY | Facility: HOME HEALTHCARE | Age: 51
End: 2021-03-10
Payer: COMMERCIAL

## 2021-03-10 DIAGNOSIS — M51.36 DDD (DEGENERATIVE DISC DISEASE), LUMBAR: Primary | ICD-10-CM

## 2021-03-10 PROCEDURE — 97110 THERAPEUTIC EXERCISES: CPT

## 2021-03-10 NOTE — PROGRESS NOTES
Daily Note     Today's date: 3/10/2021  Patient name: Azucena Li  : 1970  MRN: [id number]  Referring provider: Willim Gitelman, DO  Dx:   Encounter Diagnosis     ICD-10-CM    1  DDD (degenerative disc disease), lumbar  M51 36                   Subjective: I have pressure from L LB and down to the front of my thigh  I don't really have pain  Objective: See treatment diary below    Assessment: Tolerated treatment well  Initiated TE as per flow sheet  Pt reported mild pain increase during wt bearing ex  VC's needed for correct form and lumbar control  Instructed pt in self calf/HS and pt reports less tightness at end  Patient would benefit from continued PT    Plan: Continue per plan of care        Re-eval Date: 21    Date 3/5 3-10-21      Visit Count 1 2      FOTO Completed              Precautions: chronic back pain       Manuals 3-5 3-10-21        Calf/HS with self assist 10'      Neuro Re-Ed         TA   5" 1 x 10      TA + marches   1 x 10      TA + MTP/LTP        Palloff press                                 Ther Ex        NuStep  L1 10 minutes  L 1  10'      LTR  HS stretch  Reviewed for HEP        SBB Reviewed for HEP 5" 1 x 10      Standing hip flex/abd/ext   Flex/abd  1 x 10 Jamshid      Standing marches         Squats with postural awareness         Step-ups         SLR  1 x 10      S/L SLR         Clamshells        Hip add   3" 1 x10      Bridges   1 x 10      Cat and camel         Quad alt UE        Quad alt LE                 Ther Activity                        Gait Training                        Modalities

## 2021-03-12 ENCOUNTER — OFFICE VISIT (OUTPATIENT)
Dept: PHYSICAL THERAPY | Facility: HOME HEALTHCARE | Age: 51
End: 2021-03-12
Payer: COMMERCIAL

## 2021-03-12 DIAGNOSIS — M51.36 DDD (DEGENERATIVE DISC DISEASE), LUMBAR: Primary | ICD-10-CM

## 2021-03-12 PROCEDURE — 97110 THERAPEUTIC EXERCISES: CPT | Performed by: PHYSICAL THERAPIST

## 2021-03-12 NOTE — PROGRESS NOTES
Daily Note     Today's date: 3/12/2021  Patient name: Holly Burks  : 1970  MRN: [id number]  Referring provider: Lenore Tolliver DO  Dx:   Encounter Diagnosis     ICD-10-CM    1  DDD (degenerative disc disease), lumbar  M51 36                   Subjective: The patient states that he felt looser after his last session, no increase in pain  No pain in his back today, only in his LLE  Objective: See treatment diary below      Assessment: Some verbal cues are needed for correct form with completing TE  Good tolerance to all TE today and no increase in pain noted during session  He declined HP/CP today  Continued PT would be beneficial to improve function  Plan: Continue per plan of care  Progress as able in upcoming visits         Re-eval Date: 21    Date 3/5 3-10-21 3/12/21     Visit Count 1 2 3     FOTO Completed              Precautions: chronic back pain       Manuals 3-5 3-10-21 3/12/21       Calf/HS with self assist 10' Calf/HS with self assist 10'     Neuro Re-Ed         TA   5" 1 x 10 5" 1 x 10     TA + marches   1 x 10 1 x 10      TA + MTP/LTP        Palloff press                                 Ther Ex        NuStep  L1 10 minutes  L 1  10' L1 10 minutes      LTR  HS stretch  Reviewed for HEP        SBB Reviewed for HEP 5" 1 x 10 5" 1 x 10      Standing hip flex/abd/ext   Flex/abd  1 x 10 Jamshid Flex/Abd  1 x 15 each Jamshid     Standing marches         Squats with postural awareness         Step-ups         SLR  1 x 10 1 x 10 Jamshid     S/L SLR         Clamshells        Hip add   3" 1 x10 3" 1 x 10     Bridges   1 x 10 1 x 10     Hip Abd TBand   Green x 10      Cat and camel         Quad alt UE        Quad alt LE                 Ther Activity                        Gait Training                        Modalities

## 2021-03-17 ENCOUNTER — OFFICE VISIT (OUTPATIENT)
Dept: PHYSICAL THERAPY | Facility: HOME HEALTHCARE | Age: 51
End: 2021-03-17
Payer: COMMERCIAL

## 2021-03-17 DIAGNOSIS — M51.36 DDD (DEGENERATIVE DISC DISEASE), LUMBAR: Primary | ICD-10-CM

## 2021-03-17 PROCEDURE — 97110 THERAPEUTIC EXERCISES: CPT

## 2021-03-17 NOTE — PROGRESS NOTES
Daily Note     Today's date: 3/17/2021  Patient name: India Montano  : 1970  MRN: [id number]  Referring provider: Nando Veliz DO  Dx:   Encounter Diagnosis     ICD-10-CM    1  DDD (degenerative disc disease), lumbar  M51 36                   Subjective:     Objective: See treatment diary below    Assessment: Tolerated treatment well  Challenged pt with addition of MTP/LTP and increased reps of a few ex  Pt denied increase in LBP but did report some mild L knee soreness at end of session  VC's needed for lumbar stability/core strength and with self stretch  Patient would benefit from continued PT    Plan: Continue per plan of care        Re-eval Date: 21    Date 3/5 3-10-21 3/12/21 3-17-21    Visit Count 1 2 3 4    FOTO Completed              Precautions: chronic back pain       Manuals 3-5 3-10-21 3/12/21 3-17-21      Calf/HS with self assist 10' Calf/HS with self assist 10' Calf/HS  Self assist 10'    Neuro Re-Ed         TA   5" 1 x 10 5" 1 x 10 5" x 10    TA + marches   1 x 10 1 x 10  1 x 10    TA + MTP/LTP    Green 1 x 10 ea    Palloff press                                 Ther Ex        NuStep  L1 10 minutes  L 1  10' L1 10 minutes  L 1  10'    LTR  HS stretch  Reviewed for HEP        SBB Reviewed for HEP 5" 1 x 10 5" 1 x 10  5" x 10    Standing hip flex/abd/ext   Flex/abd  1 x 10 Jamshid Flex/Abd  1 x 15 each Jamshid Flex/abd  1 x 15 ea Jamshid    Standing marches         Squats with postural awareness         Step-ups         SLR  1 x 10 1 x 10 Jamshid 1 x 10 jamshid    S/L SLR     1 x 10 Jamshid    Clamshells    1 x 10 Jamshid    Hip add   3" 1 x10 3" 1 x 10 3" 1 x 15    Bridges   1 x 10 1 x 10 1 x 10    Hip Abd TBand   Green x 10  Green x 15    Cat and camel         Quad alt UE        Quad alt LE                 Ther Activity                        Gait Training                        Modalities

## 2021-03-18 ENCOUNTER — OFFICE VISIT (OUTPATIENT)
Dept: PHYSICAL THERAPY | Facility: HOME HEALTHCARE | Age: 51
End: 2021-03-18
Payer: COMMERCIAL

## 2021-03-18 DIAGNOSIS — M51.36 DDD (DEGENERATIVE DISC DISEASE), LUMBAR: Primary | ICD-10-CM

## 2021-03-18 PROCEDURE — 97110 THERAPEUTIC EXERCISES: CPT

## 2021-03-18 NOTE — PROGRESS NOTES
Daily Note     Today's date: 3/18/2021  Patient name: Rose Garnett  : 1970  MRN: [id number]  Referring provider: Mattie Shabazz DO  Dx:   Encounter Diagnosis     ICD-10-CM    1  DDD (degenerative disc disease), lumbar  M51 36                 Subjective: I am pretty good considering the weather  Pain of only 2/10 at L thigh and shin  Objective: See treatment diary below    Assessment: Tolerated treatment well  Pt reports average/consistent pain level of 2/10 t/o session  VC's needed t/o session for correct form and lumbar control  Minimal vc's needed for correct stretch  Pt remains with deficits in strength, lumbar stability and B LE ROM  Patient would benefit from continued PT    Plan: Continue per plan of care        Re-eval Date: 21    Date 3/5 3-10-21 3/12/21 3-17-21 3-18-21   Visit Count 1 2 3 4 5   FOTO Completed              Precautions: chronic back pain       Manuals 3-5 3-10-21 3/12/21 3-17-21 3-18-21     Calf/HS with self assist 10' Calf/HS with self assist 10' Calf/HS  Self assist 10' Self stretch with vcs   20" x 3   Neuro Re-Ed         TA   5" 1 x 10 5" 1 x 10 5" x 10 5" x 10   TA + marches   1 x 10 1 x 10  1 x 10 1  x10   TA + MTP/LTP    Green 1 x 10 ea Green 1  x10   Palloff press                                 Ther Ex        NuStep  L1 10 minutes  L 1  10' L1 10 minutes  L 1  10' L 3  10'   LTR  HS stretch  Reviewed for HEP        SBB Reviewed for HEP 5" 1 x 10 5" 1 x 10  5" x 10 5" 1 x 10   Standing hip flex/abd/ext   Flex/abd  1 x 10 Jamshid Flex/Abd  1 x 15 each Jamshid Flex/abd  1 x 15 ea Jamshid 3-way  1 x 15   Standing marches      1 x 10   Squats with postural awareness         Step-ups         SLR  1 x 10 1 x 10 Jamshid 1 x 10 jamshid 1 x 10 jamshid   S/L SLR     1 x 10 Jamshid 1 x 10 Jamshid   Clamshells    1 x 10 Jamshid 1  x10   Hip add   3" 1 x10 3" 1 x 10 3" 1 x 15 3" 1 x 15   Bridges   1 x 10 1 x 10 1 x 10 1 x 10   Hip Abd TBand   Green x 10  Green x 15 Green x 15   Cat and camel         Quad alt UE        Quad alt LE                 Ther Activity                        Gait Training                        Modalities

## 2021-03-19 ENCOUNTER — APPOINTMENT (OUTPATIENT)
Dept: PHYSICAL THERAPY | Facility: HOME HEALTHCARE | Age: 51
End: 2021-03-19
Payer: COMMERCIAL

## 2021-03-19 ENCOUNTER — OFFICE VISIT (OUTPATIENT)
Dept: INTERNAL MEDICINE CLINIC | Facility: CLINIC | Age: 51
End: 2021-03-19
Payer: COMMERCIAL

## 2021-03-19 VITALS
DIASTOLIC BLOOD PRESSURE: 82 MMHG | HEART RATE: 74 BPM | BODY MASS INDEX: 40.74 KG/M2 | OXYGEN SATURATION: 95 % | HEIGHT: 71 IN | TEMPERATURE: 97 F | SYSTOLIC BLOOD PRESSURE: 138 MMHG | WEIGHT: 291 LBS

## 2021-03-19 DIAGNOSIS — Z01.00 DIABETIC EYE EXAM (HCC): Primary | ICD-10-CM

## 2021-03-19 DIAGNOSIS — E78.2 MIXED HYPERLIPIDEMIA: ICD-10-CM

## 2021-03-19 DIAGNOSIS — D69.6 THROMBOCYTOPENIA (HCC): ICD-10-CM

## 2021-03-19 DIAGNOSIS — I10 ESSENTIAL HYPERTENSION: ICD-10-CM

## 2021-03-19 DIAGNOSIS — M50.30 DDD (DEGENERATIVE DISC DISEASE), CERVICAL: ICD-10-CM

## 2021-03-19 DIAGNOSIS — E11.9 DIABETIC EYE EXAM (HCC): Primary | ICD-10-CM

## 2021-03-19 DIAGNOSIS — G89.4 CHRONIC PAIN SYNDROME: ICD-10-CM

## 2021-03-19 DIAGNOSIS — M47.816 OSTEOARTHRITIS OF LUMBAR SPINE, UNSPECIFIED SPINAL OSTEOARTHRITIS COMPLICATION STATUS: ICD-10-CM

## 2021-03-19 DIAGNOSIS — E11.9 TYPE 2 DIABETES MELLITUS WITHOUT COMPLICATION, WITHOUT LONG-TERM CURRENT USE OF INSULIN (HCC): ICD-10-CM

## 2021-03-19 PROBLEM — S86.912A KNEE STRAIN, LEFT, INITIAL ENCOUNTER: Status: RESOLVED | Noted: 2020-07-31 | Resolved: 2021-03-19

## 2021-03-19 PROCEDURE — 3008F BODY MASS INDEX DOCD: CPT | Performed by: INTERNAL MEDICINE

## 2021-03-19 PROCEDURE — 1036F TOBACCO NON-USER: CPT | Performed by: INTERNAL MEDICINE

## 2021-03-19 PROCEDURE — 3075F SYST BP GE 130 - 139MM HG: CPT | Performed by: INTERNAL MEDICINE

## 2021-03-19 PROCEDURE — 3079F DIAST BP 80-89 MM HG: CPT | Performed by: INTERNAL MEDICINE

## 2021-03-19 PROCEDURE — 99213 OFFICE O/P EST LOW 20 MIN: CPT | Performed by: INTERNAL MEDICINE

## 2021-03-19 NOTE — PATIENT INSTRUCTIONS

## 2021-03-19 NOTE — PROGRESS NOTES
Assessment/Plan:  Problem List Items Addressed This Visit        Endocrine    Type 2 diabetes mellitus without complication ([facility] [address] )    Relevant Medications    Empagliflozin 25 MG TABS    Other Relevant Orders    Ambulatory referral to Ophthalmology       Cardiovascular and Mediastinum    Essential hypertension       Musculoskeletal and Integument    DDD (degenerative disc disease), cervical    Osteoarthritis of lumbar spine       Other    Chronic pain syndrome    Mixed hyperlipidemia    Thrombocytopenia ([facility] [address] )      Other Visit Diagnoses     Diabetic eye exam ([facility] )    -  Primary    Relevant Orders    Ambulatory referral to Ophthalmology           Diagnoses and all orders for this visit:    Diabetic eye exam [facility])  -     Ambulatory referral to Ophthalmology; Future    Type 2 diabetes mellitus without complication, without long-term current use of insulin ([facility] [address] )  -     Ambulatory referral to Ophthalmology; Future  -     Empagliflozin 25 MG TABS; Take 1 tablet (25 mg total) by mouth every morning    Essential hypertension    Mixed hyperlipidemia    Chronic pain syndrome    Thrombocytopenia (HCC)    Osteoarthritis of lumbar spine, unspecified spinal osteoarthritis complication status    DDD (degenerative disc disease), cervical        No problem-specific Assessment & Plan notes found for this encounter  A/P: Doing ok, but sugars still not at goal  Will resend the SGLT2  Continue current treatment otherwise  Keep f/u with PT and pain management  RTC one month for f/u dm     Subjective:      Patient ID: Brittaney Polo is a 52 y o  male  WM RTC for f/u dm, htn, etc  Doing ok and no new issues  Remains active w/o difficulty and attending PT for the neck and back issues  No falls  Delfino Joshi was suppose to be started, but pt reports not getting a call from the pharmacy  Remains solely on amaryl sugars remain 150-200 range  Labs are up to date         The following portions of the patient's history were reviewed and updated as appropriate:   He has a past medical history of Diabetes mellitus ([address] ), Hypertension, and Kidney stones  ,  does not have any pertinent problems on file  ,   has a past surgical history that includes Neck surgery and Cholecystectomy  ,  family history includes Breast cancer in his mother; Diabetes in his father, mother, and sister; Fibromyalgia in his sister; Hypertension in his mother  ,   reports that he has never smoked  He has never used smokeless tobacco  He reports that he does not drink alcohol or use drugs  ,  is allergic to codeine; lisinopril; metformin; and penicillins     Current Outpatient Medications   Medication Sig Dispense Refill    Ascorbic Acid (VITAMIN C) 1000 MG tablet Take 1,000 mg by mouth daily      atorvastatin (LIPITOR) 20 mg tablet Take 1 tablet (20 mg total) by mouth daily 90 tablet 3    Stacey Microlet Lancets lancets Use as instructed 100 each 1    Blood Glucose Monitoring Suppl ([employer] CONTOUR MONITOR) TRACEY by Does not apply route daily 1 Device 0    Cholecalciferol (VITAMIN D3) 956664 UNIT/GM POWD by Does not apply route      Empagliflozin 25 MG TABS Take 1 tablet (25 mg total) by mouth every morning 90 tablet 1    glimepiride (AMARYL) 4 mg tablet Two po q day  180 tablet 0    glucose blood (Contour Next Test) test strip Check blood glucose  each 1    metoprolol succinate (TOPROL-XL) 100 mg 24 hr tablet Take 0 5 tablets (50 mg total) by mouth daily 90 tablet 1    predniSONE 10 mg tablet 60mg po q d x 5, then 50mg po q d x 5, then 40mg po q d x 5, then 30mg po q d x 5, 20mg q day x 5 and 10mg q day x 5 then d/c  90 tablet 0    valACYclovir (VALTREX) 1,000 mg tablet Take 1 tablet (1,000 mg total) by mouth daily for 5 days 5 tablet 0     No current facility-administered medications for this visit  Review of Systems   Constitutional: Negative for activity change, chills, diaphoresis, fatigue and fever  HENT: Negative      Eyes: Negative for visual disturbance  Respiratory: Negative for cough, chest tightness, shortness of breath and wheezing  Cardiovascular: Negative for chest pain, palpitations and leg swelling  Gastrointestinal: Negative for abdominal pain, constipation, diarrhea, nausea and vomiting  Endocrine: Negative for cold intolerance  Genitourinary: Negative for difficulty urinating, dysuria and frequency  Musculoskeletal: Positive for back pain, neck pain and neck stiffness  Negative for arthralgias, gait problem and myalgias  Neurological: Negative for dizziness, seizures, syncope, weakness, light-headedness and headaches  Psychiatric/Behavioral: Negative for agitation, confusion, dysphoric mood and sleep disturbance  The patient is not nervous/anxious  PHQ-9 Depression Screening    PHQ-9:   Frequency of the following problems over the past two weeks:            Objective:  Vitals:    03/19/21 0932   BP: 138/82   Pulse: 74   Temp: (!) 97 °F (36 1 °C)   SpO2: 95%   Weight: 132 kg (291 lb)   Height: 5' 11" (1 803 m)     Body mass index is 40 59 kg/m²  Physical Exam  Vitals signs and nursing note reviewed  Constitutional:       General: He is not in acute distress  Appearance: Normal appearance  He is not ill-appearing  HENT:      Head: Normocephalic and atraumatic  Mouth/Throat:      Mouth: Mucous membranes are moist    Eyes:      Extraocular Movements: Extraocular movements intact  Conjunctiva/sclera: Conjunctivae normal       Pupils: Pupils are equal, round, and reactive to light  Neck:      Musculoskeletal: Neck supple  Vascular: No carotid bruit  Cardiovascular:      Rate and Rhythm: Normal rate and regular rhythm  Heart sounds: Normal heart sounds  Pulmonary:      Effort: Pulmonary effort is normal  No respiratory distress  Breath sounds: Normal breath sounds  No wheezing or rales  Abdominal:      General: Bowel sounds are normal  There is no distension        Palpations: Abdomen is soft  Tenderness: There is no abdominal tenderness  Musculoskeletal:      Right lower leg: No edema  Left lower leg: No edema  Neurological:      General: No focal deficit present  Mental Status: He is alert and oriented to person, place, and time  Mental status is at baseline  Psychiatric:         Mood and Affect: Mood normal          Behavior: Behavior normal          Thought Content:  Thought content normal          Judgment: Judgment normal

## 2021-03-22 ENCOUNTER — TELEPHONE (OUTPATIENT)
Dept: INTERNAL MEDICINE CLINIC | Facility: CLINIC | Age: 51
End: 2021-03-22

## 2021-03-24 ENCOUNTER — OFFICE VISIT (OUTPATIENT)
Dept: PHYSICAL THERAPY | Facility: HOME HEALTHCARE | Age: 51
End: 2021-03-24
Payer: COMMERCIAL

## 2021-03-24 DIAGNOSIS — M51.36 DDD (DEGENERATIVE DISC DISEASE), LUMBAR: Primary | ICD-10-CM

## 2021-03-24 DIAGNOSIS — E11.9 TYPE 2 DIABETES MELLITUS WITHOUT COMPLICATION, WITHOUT LONG-TERM CURRENT USE OF INSULIN (HCC): Primary | ICD-10-CM

## 2021-03-24 PROCEDURE — 97112 NEUROMUSCULAR REEDUCATION: CPT | Performed by: PHYSICAL THERAPIST

## 2021-03-24 PROCEDURE — 97110 THERAPEUTIC EXERCISES: CPT | Performed by: PHYSICAL THERAPIST

## 2021-03-24 NOTE — PROGRESS NOTES
Daily Note     Today's date: 3/24/2021  Patient name: Elyssa Quintanilla  : 1970  MRN: [id number]  Referring provider: Hilario Linton DO  Dx:   Encounter Diagnosis     ICD-10-CM    1  DDD (degenerative disc disease), lumbar  M51 36                   Subjective: Pt notes that he feels more sore with the rainy weather today  Objective: See treatment diary below      Assessment: Increased reps with majority of program this date without any significant changes in pain  PT will note any post-tx soreness and continue with progression as agreeable  Core/BLE strength deficits remain evident with need for continued therapy to address  Plan: Continue per plan of care        Re-eval Date: 21    Date 3/24       Visit Count 6       FOTO              Precautions: chronic back pain       Manuals 3/24    3-18-21    Self HS   30" x 4 jamshid     Self stretch with vcs   20" x 3   Neuro Re-Ed         TA      5" x 10   TA + marches      1  x10   TA + MTP/LTP Green 1 x 15    Green 1  x10   Palloff press                                 Ther Ex        NuStep  L3 10 min     L 3  10'   LTR  HS stretch         SBB 5" x 10     5" 1 x 10   Standing hip flex/abd/ext  3 way   2 x 10     3-way  1 x 15   Standing marches  2 x 10     1 x 10   Squats with postural awareness         Step-ups         SLR 1 x 10 Jamshid    1 x 10 jamshid   S/L SLR  1 x 10 Jamshid    1 x 10 Jamshid   Clamshells 1 x 10     1  x10   Hip add  2 x 10     3" 1 x 15   Bridges  1 x 15     1 x 10   Hip Abd TBand Green x 20     Green x 15   Cat and camel         Quad alt UE        Quad alt LE                 Ther Activity                        Gait Training                        Modalities

## 2021-03-26 ENCOUNTER — OFFICE VISIT (OUTPATIENT)
Dept: PHYSICAL THERAPY | Facility: HOME HEALTHCARE | Age: 51
End: 2021-03-26
Payer: COMMERCIAL

## 2021-03-26 DIAGNOSIS — M51.36 DDD (DEGENERATIVE DISC DISEASE), LUMBAR: Primary | ICD-10-CM

## 2021-03-26 PROCEDURE — 97112 NEUROMUSCULAR REEDUCATION: CPT

## 2021-03-26 PROCEDURE — 97110 THERAPEUTIC EXERCISES: CPT

## 2021-03-26 NOTE — PROGRESS NOTES
Daily Note     Today's date: 3/26/2021  Patient name: Shahbaz Lance  : 1970  MRN: [id number]  Referring provider: Chadwick Griffith DO  Dx:   Encounter Diagnosis     ICD-10-CM    1  DDD (degenerative disc disease), lumbar  M51 36               Subjective: Pt reports he doesn't have any pain right now  Objective: See treatment diary below      Assessment: Tolerated treatment well  No pain reported t/o session  Added foam to standing SLR three way and marches  Increased reps with some supine exercises today  Core weakness noted  Patient would benefit from continued PT      Plan: Continue per plan of care        Re-eval Date: 21    Date 3/24 3/26/21      Visit Count 6 7      FOTO              Precautions: chronic back pain       Manuals 3/24 3/26/21   3-18-21    Self HS   30" x 4 jamshid  Self HS   30" x 4 Jamshid   Self stretch with vcs   20" x 3   Neuro Re-Ed         TA      5" x 10   TA + marches      1  x10   TA + MTP/LTP Green 1 x 15 Green 1 x 20    Green 1  x10   Palloff press                                 Ther Ex        NuStep  L3 10 min  L3  10 min    L 3  10'   LTR  HS stretch         SBB 5" x 10  5" x 10    5" 1 x 10   Standing hip flex/abd/ext  3 way   2 x 10  3 way  2 x 10 Foam    3-way  1 x 15   Standing marches  2 x 10  2 x 10  Foam    1 x 10   Squats with postural awareness         Step-ups         SLR 1 x 10 Jamshid 1 x 15 Jamshid   1 x 10 jamshid   S/L SLR  1 x 10 Jamshid 1 x 15 Jamshid   1 x 10 Jamshid   Clamshells 1 x 10  1 x 15    1  x10   Hip add  2 x 10  2 x 10    3" 1 x 15   Bridges  1 x 15  1 x 15    1 x 10   Hip Abd TBand Green x 20  Green x 20   Green x 15   Cat and camel         Quad alt UE        Quad alt LE                 Ther Activity                        Gait Training                        Modalities

## 2021-03-31 ENCOUNTER — OFFICE VISIT (OUTPATIENT)
Dept: PHYSICAL THERAPY | Facility: HOME HEALTHCARE | Age: 51
End: 2021-03-31
Payer: COMMERCIAL

## 2021-03-31 DIAGNOSIS — M51.36 DDD (DEGENERATIVE DISC DISEASE), LUMBAR: Primary | ICD-10-CM

## 2021-03-31 PROCEDURE — 97110 THERAPEUTIC EXERCISES: CPT

## 2021-03-31 PROCEDURE — 97112 NEUROMUSCULAR REEDUCATION: CPT

## 2021-04-02 ENCOUNTER — OFFICE VISIT (OUTPATIENT)
Dept: PHYSICAL THERAPY | Facility: HOME HEALTHCARE | Age: 51
End: 2021-04-02
Payer: COMMERCIAL

## 2021-04-02 DIAGNOSIS — M51.36 DDD (DEGENERATIVE DISC DISEASE), LUMBAR: Primary | ICD-10-CM

## 2021-04-02 PROCEDURE — 97110 THERAPEUTIC EXERCISES: CPT

## 2021-04-02 PROCEDURE — 97112 NEUROMUSCULAR REEDUCATION: CPT

## 2021-04-02 NOTE — PROGRESS NOTES
Daily Note     Today's date: 2021  Patient name: Michelle Mon  : 1970  MRN: [id number]  Referring provider: Edis Mendez DO  Dx:   Encounter Diagnosis     ICD-10-CM    1  DDD (degenerative disc disease), lumbar  M51 36               Subjective: Pt reports he is a little stiff this morning but has no pain right now  Objective: See treatment diary below      Assessment: Tolerated treatment well  Pt reports a little pain in his R hip with standing hip abd  Pt with no c/o pain in his back t/o session  Pt reports his back felt looser at the of session  Patient would benefit from continued PT      Plan: Continue per plan of care        Re-eval Date: 21    Date 3/24 3/26/21 3-31-21 4/2/21    Visit Count 6 7 8 9    FOTO              Precautions: chronic back pain       Manuals 3/24 3/26/21 3-31-21 4/2/21 3-18-21    Self HS   30" x 4 jamshid  Self HS   30" x 4 Jamshid Self HS    30" x 4 Jamshid Self HS  30" x 4 Jamshid Self stretch with vcs   20" x 3   Neuro Re-Ed         TA      5" x 10   TA + marches      1  x10   TA + MTP/LTP Green 1 x 15 Green 1 x 20  Green 1  x20 Green 1 x 20  Green 1  x10   Palloff press                                 Ther Ex        NuStep  L3 10 min  L3  10 min  L 3  10' L3  10 min  L 3  10'   LTR  HS stretch         SBB 5" x 10  5" x 10  5" x 10 5" x 10  5" 1 x 10   Standing hip flex/abd/ext  3 way   2 x 10  3 way  2 x 10 Foam  Foam   2 x 10 ea Foam   2 x 10 ea  3-way  1 x 15   Standing marches  2 x 10  2 x 10  Foam  Foam 2  x10 Foam 2 x 10  1 x 10   Squats with postural awareness         Step-ups         SLR 1 x 10 Jamshid 1 x 15 Jamshid 1  x15 Jamshid 1 x 15 Jamshid 1 x 10 jamshid   S/L SLR  1 x 10 Jamshid 1 x 15 Jamshid 1 x 15 1 x 15  1 x 10 Jamshid   Clamshells 1 x 10  1 x 15  1  x15 1 x 15  1  x10   Hip add  2 x 10  2 x 10  2 x 10 2 x 10 3" 1 x 15   Bridges  1 x 15  1 x 15  1 x 15  1 x 15  1 x 10   Hip Abd TBand Green x 20  Green x 20 Green x 20 Green x 20 Green x 15   Cat and camel         Quad alt UE Quad alt LE                 Ther Activity                        Gait Training                        Modalities

## 2021-04-07 ENCOUNTER — OFFICE VISIT (OUTPATIENT)
Dept: PHYSICAL THERAPY | Facility: HOME HEALTHCARE | Age: 51
End: 2021-04-07
Payer: COMMERCIAL

## 2021-04-07 DIAGNOSIS — M51.36 DDD (DEGENERATIVE DISC DISEASE), LUMBAR: Primary | ICD-10-CM

## 2021-04-07 PROCEDURE — 97112 NEUROMUSCULAR REEDUCATION: CPT | Performed by: PHYSICAL THERAPIST

## 2021-04-07 PROCEDURE — 97110 THERAPEUTIC EXERCISES: CPT | Performed by: PHYSICAL THERAPIST

## 2021-04-07 NOTE — PROGRESS NOTES
Daily Note     Today's date: 2021  Patient name: Eddie Conrad  : 1970  MRN: [id number]  Referring provider: Cindi Anderson DO  Dx:   Encounter Diagnosis     ICD-10-CM    1  DDD (degenerative disc disease), lumbar  M51 36                   Subjective: Pt notes that the knees are bothering him a little today  Objective: See treatment diary below      Assessment: PT added in progressed core strengthening activities this date without any significant changes in pain per pt report  PT to continue with strengthening progression as tolerable     Plan: Continue per plan of care        Re-eval Date: 21    Date 3/24 3/26/21 3-31-21 4/2/21 4/7   Visit Count 6 7 8 9 10   FOTO              Precautions: chronic back pain       Manuals 3/24 3/26/21 3-31-21 4/2/21 4/7    Self HS   30" x 4 jamshid  Self HS   30" x 4 Jamshid Self HS    30" x 4 Jamshid Self HS  30" x 4 Jamshid    Neuro Re-Ed         TA         TA + marches         TA + MTP/LTP Green 1 x 15 Green 1 x 20  Green 1  x20 Green 1 x 20     Palloff press      Green   5" x 10                            Ther Ex        NuStep  L3 10 min  L3  10 min  L 3  10' L3  10 min  L4 10 minutes    LTR  HS stretch         SBB 5" x 10  5" x 10  5" x 10 5" x 10  5" x 10    Standing hip flex/abd/ext  3 way   2 x 10  3 way  2 x 10 Foam  Foam   2 x 10 ea Foam   2 x 10 ea  Foam   2 x 10    Standing marches  2 x 10  2 x 10  Foam  Foam 2  x10 Foam 2 x 10  Foam   2 x 10    Squats with postural awareness      X 10    Step-ups         SLR 1 x 10 Jamshid 1 x 15 Jamshid 1  x15 Jamshid 1 x 15 Jamshid 2 x 10 jamshid    S/L SLR  1 x 10 Jamshid 1 x 15 Jamshid 1 x 15 1 x 15  2 x 10    Clamshells 1 x 10  1 x 15  1  x15 1 x 15  2 x 10    Hip add  2 x 10  2 x 10  2 x 10 2 x 10    Bridges  1 x 15  1 x 15  1 x 15  1 x 15  1 x 15    Hip Abd TBand Green x 20  Green x 20 Green x 20 Green x 20 Blue x 20    TA + OH ball lift      Cues for TA  X 10    Cat and camel         Quad alt UE        Quad alt LE                 Ther Activity Gait Training                        Modalities

## 2021-04-09 ENCOUNTER — EVALUATION (OUTPATIENT)
Dept: PHYSICAL THERAPY | Facility: HOME HEALTHCARE | Age: 51
End: 2021-04-09
Payer: COMMERCIAL

## 2021-04-09 DIAGNOSIS — M51.36 DDD (DEGENERATIVE DISC DISEASE), LUMBAR: Primary | ICD-10-CM

## 2021-04-09 PROCEDURE — 97112 NEUROMUSCULAR REEDUCATION: CPT | Performed by: PHYSICAL THERAPIST

## 2021-04-09 PROCEDURE — 97140 MANUAL THERAPY 1/> REGIONS: CPT | Performed by: PHYSICAL THERAPIST

## 2021-04-09 PROCEDURE — 97110 THERAPEUTIC EXERCISES: CPT | Performed by: PHYSICAL THERAPIST

## 2021-04-09 NOTE — PROGRESS NOTES
PT Re-Evaluation     Today's date: 2021  Patient name: Paula Maciel  : 1970  MRN: [id number]  Referring provider: Maxine Bourgeois DO  Dx:   Encounter Diagnosis     ICD-10-CM    1  DDD (degenerative disc disease), lumbar  M51 36                   Assessment  Assessment details: Pt Chilango Nelson is a 52 y o  who presents to OPPT with s/s consistent with chronic lumbar pain  PT notes pt with limited L/S ROM, decreased core and LE strength, postural dysfunction contributed to by soft tissue/mm restrictions, and increased pain with daily activities  Pt denies any radiating N/T into BLE at this time (mild numbness superior/inferior to L knee from prior incident)  Pt notes that he has trouble with bending and twisting, unable to tolerate lying on his stomach, difficulty with prolonged ambulating/standing, and disrupted sleeping  Pt would benefit from skilled therapy services to address outlined impairments, work towards goals, and restore pts PLOF  Thank you! UPDATE: Pt notes that he is seeing improvement with therapy and is reporting overall significant reduction in pain since [facility]  PT notes that spinal mobility has improved with only mild restrictions remaining evident and pt with report of "tightness" at end range  He remains with persistent B LE weakness L >R at this time with need for continued therapy and progression of program to address deficits  Thank you! Impairments: abnormal gait, activity intolerance, impaired balance, impaired physical strength, pain with function, weight-bearing intolerance and poor posture   Understanding of Dx/Px/POC: good   Prognosis: good    Goals  STGs to be achieved in 4 weeks:  -Pt to demonstrate reduced subjective pain rating "at worst" by at least 2-3 points from Initial Eval to allow for reduced pain with ADLs and improved functional activity tolerance   - MET  -Pt to demonstrate full AROM of L/S in order to maximize joint mobility and function and allow for progression of exercise program and achievement of goals  - ONGOING    -Pt to demonstrate increased MMT of BLE by at least 1/2-1 grade in order to improve safety and stability with ADLs and functional mobility  - MET    LTGs to be achieved in 6-8 weeks:  -Pt will be I with HEP in order to continue to improve quality of life and independence and reduce risk for re-injury    -Pt to demonstrate return to activities of daily living without limiations or restrictions    -Pt to demonstrate ability to bend and lift > 10# without an increase in symptoms   -Pt to demonstrate improved function as noted by achieving or exceeding predicted score on FOTO outcomes assessment tool  Plan  Plan details: PT provided pt with detailed HEP program; pt verbalized understanding of program    Patient would benefit from: skilled physical therapy  Planned modality interventions: thermotherapy: hydrocollator packs  Planned therapy interventions: abdominal trunk stabilization, balance, neuromuscular re-education, manual therapy, home exercise program, functional ROM exercises, flexibility, therapeutic activities, therapeutic exercise, patient education and postural training  Frequency: 2x week  Duration in weeks: 4  Plan of Care beginning date: 2021  Plan of Care expiration date: 2021  Treatment plan discussed with: patient        Subjective Evaluation    History of Present Illness  Mechanism of injury: Pt notes that he is seeing good improvement with therapy but continues to have a lot of weakness in the legs  He will go back to see the Dr Marina Shaffer in 2 weeks     Quality of life: good    Pain  At best pain ratin  At worst pain ratin  Quality: dull ache and tight  Relieving factors: medications and ice (OTC prn )  Aggravating factors: standing and walking  Progression: worsening    Social Support    Employment status: not working    Diagnostic Tests  X-ray: abnormal  Treatments  Current treatment: physical therapy  Patient Goals  Patient goals for therapy: increased strength, increased motion, decreased pain, independence with ADLs/IADLs and improved balance          Objective     Concurrent Complaints  Negative for bladder dysfunction and bowel dysfunction    Postural Observations  Seated posture: fair  Standing posture: fair    Additional Postural Observation Details  Decreased lumbar lordosis     Palpation   Left   Hypertonic in the erector spinae  Tenderness of the erector spinae  Right   Hypertonic in the erector spinae  Tenderness of the erector spinae  Active Range of Motion     Lumbar   Flexion:  WFL  Extension:  with pain Restriction level: moderate  Left lateral flexion:  Restriction level: minimal  Right lateral flexion:  Restriction level: minimal    Strength/Myotome Testing     Left Hip   Planes of Motion   Flexion: 4-  Abduction: 4  Adduction: 4-    Right Hip   Planes of Motion   Flexion: 4+  Abduction: 4+  Adduction: 4+    Left Knee   Flexion: 4-  Extension: 4-    Right Knee   Flexion: 4  Extension: 4    Tests     Lumbar     Left   Positive passive SLR  Negative crossed SLR  Right   Positive passive SLR  Negative crossed SLR       General Comments:    Lower quarter screen   Hips: unremarkable    Hip Comments   B LE ROM WFL                Re-eval Date: 5/9/21    Date 4/9       Visit Count 11       FOTO Completed              Precautions: chronic back pain       Manuals 4/9 4/7           Neuro Re-Ed         TA         TA + marches         TA + MTP/LTP        Palloff press      Green   5" x 10                            Ther Ex        NuStep  L4 10 minutes     L4 10 minutes    LTR  HS stretch         SBB 5" x 10     5" x 10    Standing hip flex/abd/ext  Foam   2 x 10     Foam   2 x 10    Standing marches  Foam   2 x 10     Foam   2 x 10    Squats with postural awareness      X 10    Step-ups  Fwd 4"   2 x 10        SLR 2 x 10     2 x 10 noel    S/L SLR  2 x 10     2 x 10    Clamshells 2 x 10     2 x 10    Hip add         Bridges  2 x 10     1 x 15    Hip Abd TBand Blue x 20    Blue x 20    TA + OH ball lift  X 10     Cues for TA  X 10    Cat and camel         Quad alt UE        Quad alt LE                 Ther Activity                        Gait Training                        Modalities

## 2021-04-14 ENCOUNTER — OFFICE VISIT (OUTPATIENT)
Dept: PHYSICAL THERAPY | Facility: HOME HEALTHCARE | Age: 51
End: 2021-04-14
Payer: COMMERCIAL

## 2021-04-14 DIAGNOSIS — M51.36 DDD (DEGENERATIVE DISC DISEASE), LUMBAR: Primary | ICD-10-CM

## 2021-04-14 PROCEDURE — 97112 NEUROMUSCULAR REEDUCATION: CPT | Performed by: PHYSICAL THERAPIST

## 2021-04-14 PROCEDURE — 97110 THERAPEUTIC EXERCISES: CPT | Performed by: PHYSICAL THERAPIST

## 2021-04-14 NOTE — PROGRESS NOTES
Daily Note     Today's date: 2021  Patient name: Lopez Nino  : 1970  MRN: [id number]  Referring provider: Doris Carrero DO  Dx:   Encounter Diagnosis     ICD-10-CM    1  DDD (degenerative disc disease), lumbar  M51 36                   Subjective: Pt states that he is feeling 'okay' today  Objective: See treatment diary below      Assessment: Pt continues to be challenged with core stability program but showing good progression with program  Difficulty/inability to complete quadriped activities 2* L knee pain  Plan: Continue per plan of care        Re-eval Date: 21    Date       Visit Count 11 12      FOTO Completed              Precautions: chronic back pain       Manuals            Neuro Re-Ed         TA         TA + marches         TA + MTP/LTP  Green   2 x 10 ea       Palloff press   Green   5" x 10    Green   5" x 10                            Ther Ex        NuStep  L4 10 minutes  L4   10 minutes    L4 10 minutes    LTR  HS stretch         SBB 5" x 10  5" x 10    5" x 10    Standing hip flex/abd/ext  Foam   2 x 10  Foam 2 x 10    Foam   2 x 10    Standing marches  Foam   2 x 10  Foam 2 x 10    Foam   2 x 10    Squats with postural awareness   X 10    X 10    Step-ups  Fwd 4"   2 x 10  Fwd 4"   2 x 10       SLR 2 x 10  3 x 10    2 x 10 noel    S/L SLR  2 x 10  2 x 10    2 x 10    Clamshells 2 x 10  2 x 10    2 x 10    Hip add         Bridges  2 x 10  2 x 10    1 x 15    Hip Abd TBand Blue x 20    Blue x 20    TA + OH ball lift  X 10  Standing   X 10    Cues for TA  X 10    Cat and camel         Quad alt UE        Quad alt LE   Attempted   R x 10   L unable    DC             Ther Activity                        Gait Training                        Modalities

## 2021-04-16 ENCOUNTER — OFFICE VISIT (OUTPATIENT)
Dept: PHYSICAL THERAPY | Facility: HOME HEALTHCARE | Age: 51
End: 2021-04-16
Payer: COMMERCIAL

## 2021-04-16 DIAGNOSIS — M51.36 DDD (DEGENERATIVE DISC DISEASE), LUMBAR: Primary | ICD-10-CM

## 2021-04-16 PROCEDURE — 97112 NEUROMUSCULAR REEDUCATION: CPT

## 2021-04-16 PROCEDURE — 97110 THERAPEUTIC EXERCISES: CPT

## 2021-04-16 NOTE — PROGRESS NOTES
Daily Note     Today's date: 2021  Patient name: Micha Berg  : 1970  MRN: [id number]  Referring provider: Prasanna Carney DO  Dx:   Encounter Diagnosis     ICD-10-CM    1  DDD (degenerative disc disease), lumbar  M51 36               Subjective: Pt reports his back is not too bad this morning and reports 1/10 pain in his back  Objective: See treatment diary below      Assessment: Tolerated treatment fairly well  Some verbal cues needed t/o exercise to perform correctly  Core strength deficits noted  No increased pain reported in his back t/o session  Patient would benefit from continued PT      Plan: Continue per plan of care        Re-eval Date: 21    Date 21     Visit Count 11 12 13     FOTO Completed   Completed            Precautions: chronic back pain       Manuals 21           Neuro Re-Ed         TA         TA + marches         TA + MTP/LTP  Green   2 x 10 ea  Green   2 x 10 ea      Palloff press   Green   5" x 10  Green   5" x 10   Green   5" x 10                            Ther Ex        NuStep  L4 10 minutes  L4   10 minutes  L 4  10 min   L4 10 minutes    LTR  HS stretch         SBB 5" x 10  5" x 10  5" x 10   5" x 10    Standing hip flex/abd/ext  Foam   2 x 10  Foam 2 x 10  Foam 2 x 10 ea   Foam   2 x 10    Standing marches  Foam   2 x 10  Foam 2 x 10  Foam 2 x 10   Foam   2 x 10    Squats with postural awareness   X 10  1 x 10  X 10    Step-ups  Fwd 4"   2 x 10  Fwd 4"   2 x 10  B  2 x 10      SLR 2 x 10  3 x 10  3 x 10   2 x 10 noel    S/L SLR  2 x 10  2 x 10  2 x 10   2 x 10    Clamshells 2 x 10  2 x 10  2 x 10   2 x 10    Hip add         Bridges  2 x 10  2 x 10  2 x 10   1 x 15    Hip Abd TBand Blue x 20    Blue x 20    TA + OH ball lift  X 10  Standing   X 10  Standing   X 10   Cues for TA  X 10    Cat and camel         Quad alt UE        Quad alt LE   Attempted   R x 10   L unable    DC             Ther Activity Gait Training                        Modalities

## 2021-04-19 ENCOUNTER — OFFICE VISIT (OUTPATIENT)
Dept: PAIN MEDICINE | Facility: CLINIC | Age: 51
End: 2021-04-19
Payer: COMMERCIAL

## 2021-04-19 VITALS
SYSTOLIC BLOOD PRESSURE: 134 MMHG | BODY MASS INDEX: 40.85 KG/M2 | HEIGHT: 71 IN | DIASTOLIC BLOOD PRESSURE: 82 MMHG | TEMPERATURE: 98.9 F | WEIGHT: 291.8 LBS

## 2021-04-19 DIAGNOSIS — M54.16 LUMBAR RADICULOPATHY: Primary | ICD-10-CM

## 2021-04-19 PROCEDURE — 3075F SYST BP GE 130 - 139MM HG: CPT | Performed by: ANESTHESIOLOGY

## 2021-04-19 PROCEDURE — 3079F DIAST BP 80-89 MM HG: CPT | Performed by: ANESTHESIOLOGY

## 2021-04-19 PROCEDURE — 99214 OFFICE O/P EST MOD 30 MIN: CPT | Performed by: ANESTHESIOLOGY

## 2021-04-19 NOTE — PATIENT INSTRUCTIONS

## 2021-04-19 NOTE — PROGRESS NOTES
Assessment:  1  Lumbar radiculopathy        Plan:  Patient is a 22-year-old male with complaints of low back pain and bilateral leg pain with chronic pain syndrome secondary to lumbar degenerative disease, lumbar facet arthropathy presents to office for follow-up visit  Patient's complete therapy continues to experience back left leg pain  1  We will order MRI lumbar spine to better assess the discogenic pathology behind patient's current presentation  History of Present Illness: The patient is a 52 y o  male who presents for a follow up office visit in regards to Hip Pain and Leg Pain  The patients current symptoms include  2/10 constant burning, sharp, pressure-like, numbness back into right lower extremity which is worse at nighttime  Current pain medications includes:   None   I have personally reviewed and/or updated the patient's past medical history, past surgical history, family history, social history, current medications, allergies, and vital signs today  Review of Systems  Review of Systems   Respiratory: Negative for shortness of breath  Cardiovascular: Negative for chest pain  Gastrointestinal: Negative for constipation, diarrhea, nausea and vomiting  Musculoskeletal: Positive for arthralgias, gait problem and myalgias  Negative for joint swelling  Pain in extremity - L Leg   Neurological: Negative for dizziness, seizures and weakness  All other systems reviewed and are negative          Past Medical History:   Diagnosis Date    Diabetes mellitus ([address] )     Hypertension     Kidney stones        Past Surgical History:   Procedure Laterality Date    CHOLECYSTECTOMY      NECK SURGERY         Family History   Problem Relation Age of Onset    Hypertension Mother     Diabetes Mother    [place] Breast cancer Mother    [place] Diabetes Father     Diabetes Sister     Fibromyalgia Sister        Social History     Occupational History    Occupation: EMPLOYED   Tobacco Use  Smoking status: Never Smoker    Smokeless tobacco: Never Used   Substance and Sexual Activity    Alcohol use: Never     Frequency: Never    Drug use: Never    Sexual activity: Not Currently         Current Outpatient Medications:     Ascorbic Acid (VITAMIN C) 1000 MG tablet, Take 1,000 mg by mouth daily, Disp: , Rfl:     atorvastatin (LIPITOR) 20 mg tablet, Take 1 tablet (20 mg total) by mouth daily, Disp: 90 tablet, Rfl: 3    Stacey Microlet Lancets lancets, Use as instructed, Disp: 100 each, Rfl: 1    Blood Glucose Monitoring Suppl (Bass Manager CONTOUR MONITOR) TRACEY, by Does not apply route daily, Disp: 1 Device, Rfl: 0    Cholecalciferol (VITAMIN D3) 974443 UNIT/GM POWD, by Does not apply route, Disp: , Rfl:     Dapagliflozin Propanediol 10 MG TABS, Take 1 tablet (10 mg total) by mouth daily, Disp: 90 tablet, Rfl: 1    glimepiride (AMARYL) 4 mg tablet, Two po q day , Disp: 180 tablet, Rfl: 0    glucose blood (Contour Next Test) test strip, Check blood glucose BID, Disp: 100 each, Rfl: 1    metoprolol succinate (TOPROL-XL) 100 mg 24 hr tablet, Take 0 5 tablets (50 mg total) by mouth daily, Disp: 90 tablet, Rfl: 1    predniSONE 10 mg tablet, 60mg po q d x 5, then 50mg po q d x 5, then 40mg po q d x 5, then 30mg po q d x 5, 20mg q day x 5 and 10mg q day x 5 then d/c , Disp: 90 tablet, Rfl: 0    valACYclovir (VALTREX) 1,000 mg tablet, Take 1 tablet (1,000 mg total) by mouth daily for 5 days, Disp: 5 tablet, Rfl: 0    Allergies   Allergen Reactions    Codeine Nausea Only    Lisinopril      swelling    Metformin      GI    Penicillins Hives       Physical Exam:    /82 (BP Location: Left arm, Patient Position: Sitting, Cuff Size: Large)   Temp 98 9 °F (37 2 °C)   Ht 5' 11" (1 803 m)   Wt 132 kg (291 lb 12 8 oz)   BMI 40 70 kg/m²     Constitutional:normal, well developed, well nourished, alert, in no distress and non-toxic and no overt pain behavior   and obese  Eyes:anicteric  HEENT:grossly intact  Neck:supple, symmetric, trachea midline and no masses   Pulmonary:even and unlabored  Cardiovascular:No edema or pitting edema present  Skin:Normal without rashes or lesions and well hydrated  Psychiatric:Mood and affect appropriate  Neurologic:Cranial Nerves II-XII grossly intact  Musculoskeletal:normal      Lumbar/Sacral Spine examination demonstrates  Decreased range of motion lumbar spine with pain upon: flexion, lateral rotation to the left/right, and bending to the left/right  Bilateral lumbar paraspinals tender to palpation  Muscle spasms noted in the lumbar area bilaterally  4/5 lower extremity strength in all muscle groups bilaterally  Positive seated straight leg raise for bilateral lower extremities  Sensitivity to light touch intact bilateral lower extremities  2+ reflexes in the patella and Achilles  No ankle clonus     Imaging  No orders to display       No orders of the defined types were placed in this encounter

## 2021-04-21 ENCOUNTER — OFFICE VISIT (OUTPATIENT)
Dept: PHYSICAL THERAPY | Facility: HOME HEALTHCARE | Age: 51
End: 2021-04-21
Payer: COMMERCIAL

## 2021-04-21 DIAGNOSIS — M51.36 DDD (DEGENERATIVE DISC DISEASE), LUMBAR: Primary | ICD-10-CM

## 2021-04-21 PROCEDURE — 97112 NEUROMUSCULAR REEDUCATION: CPT

## 2021-04-21 PROCEDURE — 97110 THERAPEUTIC EXERCISES: CPT

## 2021-04-21 NOTE — PROGRESS NOTES
Daily Note     Today's date: 2021  Patient name: Bianca Mcintyre  : 1970  MRN: [id number]  Referring provider: Randall Robert DO  Dx:   Encounter Diagnosis     ICD-10-CM    1  DDD (degenerative disc disease), lumbar  M51 36               Subjective: Pt reports he has no pain right now  Objective: See treatment diary below      Assessment: Tolerated treatment well  No pain reported t/o session  Added ball squeeze to bridges this session  Core strength deficits noted  Patient would benefit from continued PT      Plan: Continue per plan of care        Re-eval Date: 21    Date 21    Visit Count 11 12 13 14    FOTO Completed   Completed            Precautions: chronic back pain       Manuals 21           Neuro Re-Ed         TA         TA + marches         TA + MTP/LTP  Green   2 x 10 ea  Green   2 x 10 ea  Green   2 x 10 ea     Palloff press   Green   5" x 10  Green   5" x 10  Green   5" x 10  Green   5" x 10                            Ther Ex        NuStep  L4 10 minutes  L4   10 minutes  L 4  10 min  L 4  10 min  L4 10 minutes    LTR  HS stretch         SBB 5" x 10  5" x 10  5" x 10  5" x 10  5" x 10    Standing hip flex/abd/ext  Foam   2 x 10  Foam 2 x 10  Foam 2 x 10 ea  Foam 2 x 10 ea  Foam   2 x 10    Standing marches  Foam   2 x 10  Foam 2 x 10  Foam 2 x 10  Foam 2 x 10  Foam   2 x 10    Squats with postural awareness   X 10  1 x 10 1 x 10  X 10    Step-ups  Fwd 4"   2 x 10  Fwd 4"   2 x 10  B  2 x 10  C Fwd  2 x 10     SLR 2 x 10  3 x 10  3 x 10  3 x 10  2 x 10 noel    S/L SLR  2 x 10  2 x 10  2 x 10  2 x 10  2 x 10    Clamshells 2 x 10  2 x 10  2 x 10  2 x 10  2 x 10    Hip add         Bridges  2 x 10  2 x 10  2 x 10  2 x 10 with ball squeeze  1 x 15    Hip Abd TBand Blue x 20    Blue x 20    TA + OH ball lift  X 10  Standing   X 10  Standing   X 10  Standing   X 10  Cues for TA  X 10    Cat and camel         Quad alt UE        Quad alt LE Attempted   R x 10   L unable    DC             Ther Activity                        Gait Training                        Modalities

## 2021-04-22 ENCOUNTER — OFFICE VISIT (OUTPATIENT)
Dept: PHYSICAL THERAPY | Facility: HOME HEALTHCARE | Age: 51
End: 2021-04-22
Payer: COMMERCIAL

## 2021-04-22 ENCOUNTER — OFFICE VISIT (OUTPATIENT)
Dept: INTERNAL MEDICINE CLINIC | Facility: CLINIC | Age: 51
End: 2021-04-22
Payer: COMMERCIAL

## 2021-04-22 VITALS
TEMPERATURE: 96.7 F | OXYGEN SATURATION: 96 % | WEIGHT: 290.38 LBS | SYSTOLIC BLOOD PRESSURE: 122 MMHG | BODY MASS INDEX: 40.65 KG/M2 | DIASTOLIC BLOOD PRESSURE: 76 MMHG | HEIGHT: 71 IN | HEART RATE: 62 BPM

## 2021-04-22 DIAGNOSIS — E11.9 TYPE 2 DIABETES MELLITUS WITHOUT COMPLICATION, WITHOUT LONG-TERM CURRENT USE OF INSULIN (HCC): Primary | ICD-10-CM

## 2021-04-22 DIAGNOSIS — M51.36 DDD (DEGENERATIVE DISC DISEASE), LUMBAR: Primary | ICD-10-CM

## 2021-04-22 PROCEDURE — 99213 OFFICE O/P EST LOW 20 MIN: CPT | Performed by: INTERNAL MEDICINE

## 2021-04-22 PROCEDURE — 1036F TOBACCO NON-USER: CPT | Performed by: INTERNAL MEDICINE

## 2021-04-22 PROCEDURE — 97112 NEUROMUSCULAR REEDUCATION: CPT

## 2021-04-22 PROCEDURE — 3008F BODY MASS INDEX DOCD: CPT | Performed by: INTERNAL MEDICINE

## 2021-04-22 PROCEDURE — 97110 THERAPEUTIC EXERCISES: CPT

## 2021-04-22 NOTE — PROGRESS NOTES
Assessment/Plan:  Problem List Items Addressed This Visit        Endocrine    Type 2 diabetes mellitus without complication ([facility] [address] ) - Primary           Diagnoses and all orders for this visit:    Type 2 diabetes mellitus without complication, without long-term current use of insulin ([facility] [address] )        No problem-specific Assessment & Plan notes found for this encounter  A/P: Will look into the dual insurance issue  Since sugars are reportedly better and will continue current treatment  RTC two months for routine  Subjective:      Patient ID: Yves Ho is a 52 y o  male  WM RTC for f/u uncontrolled dm after re-trying SGLT2  Pt never got the SGLT2 saying his insurance reports he has a secondary insurance that should cover it  Pt reports significantly changing his diet and sugars are now less than 140 mostly and no low sugar events  Continues with pain management  No new issues  The following portions of the patient's history were reviewed and updated as appropriate:   He has a past medical history of Diabetes mellitus ([facility] [address] ), Hypertension, and Kidney stones  ,  does not have any pertinent problems on file  ,   has a past surgical history that includes Neck surgery and Cholecystectomy  ,  family history includes Breast cancer in his mother; Diabetes in his father, mother, and sister; Fibromyalgia in his sister; Hypertension in his mother  ,   reports that he has never smoked  He has never used smokeless tobacco  He reports that he does not drink alcohol or use drugs  ,  is allergic to codeine; lisinopril; metformin; and penicillins     Current Outpatient Medications   Medication Sig Dispense Refill    Ascorbic Acid (VITAMIN C) 1000 MG tablet Take 1,000 mg by mouth daily      atorvastatin (LIPITOR) 20 mg tablet Take 1 tablet (20 mg total) by mouth daily 90 tablet 3    Cholecalciferol (VITAMIN D3) 847370 UNIT/GM POWD by Does not apply route      glimepiride (AMARYL) 4 mg tablet Two po q day   180 tablet 0  metoprolol succinate (TOPROL-XL) 100 mg 24 hr tablet Take 0 5 tablets (50 mg total) by mouth daily 90 tablet 1    Stacey Microlet Lancets lancets Use as instructed 100 each 1    Blood Glucose Monitoring Suppl (Remoov CONTOUR MONITOR) TRACEY by Does not apply route daily 1 Device 0    Dapagliflozin Propanediol 10 MG TABS Take 1 tablet (10 mg total) by mouth daily (Patient not taking: Reported on 4/19/2021) 90 tablet 1    glucose blood (Contour Next Test) test strip Check blood glucose  each 1     No current facility-administered medications for this visit  Review of Systems   Constitutional: Negative for activity change, chills, diaphoresis, fatigue and fever  Respiratory: Negative for cough, chest tightness, shortness of breath and wheezing  Cardiovascular: Negative for chest pain, palpitations and leg swelling  Gastrointestinal: Negative for abdominal pain, constipation, diarrhea, nausea and vomiting  Genitourinary: Negative for difficulty urinating, dysuria and frequency  Musculoskeletal: Negative for arthralgias, gait problem and myalgias  Neurological: Negative for dizziness, seizures, syncope, weakness, light-headedness and headaches  Psychiatric/Behavioral: Negative for confusion, dysphoric mood and sleep disturbance  The patient is not nervous/anxious  PHQ-9 Depression Screening    PHQ-9:   Frequency of the following problems over the past two weeks:            Objective:  Vitals:    04/22/21 0828   BP: 122/76   Pulse: 62   Temp: (!) 96 7 °F (35 9 °C)   SpO2: 96%   Weight: 132 kg (290 lb 6 oz)   Height: 5' 11" (1 803 m)     Body mass index is 40 5 kg/m²  Physical Exam  Vitals signs and nursing note reviewed  Constitutional:       General: He is not in acute distress  Appearance: Normal appearance  He is not ill-appearing  HENT:      Head: Normocephalic and atraumatic        Mouth/Throat:      Mouth: Mucous membranes are moist    Eyes:      Extraocular Movements: Extraocular movements intact  Conjunctiva/sclera: Conjunctivae normal       Pupils: Pupils are equal, round, and reactive to light  Neck:      Musculoskeletal: Neck supple  Vascular: No carotid bruit  Cardiovascular:      Rate and Rhythm: Normal rate and regular rhythm  Heart sounds: Normal heart sounds  Pulmonary:      Effort: Pulmonary effort is normal  No respiratory distress  Breath sounds: Normal breath sounds  No wheezing or rales  Neurological:      General: No focal deficit present  Mental Status: He is alert and oriented to person, place, and time  Mental status is at baseline  Psychiatric:         Mood and Affect: Mood normal          Behavior: Behavior normal          Thought Content:  Thought content normal          Judgment: Judgment normal

## 2021-04-22 NOTE — PATIENT INSTRUCTIONS
Managing Diabetes During Sick Days   AMBULATORY CARE:   Sick day management  is a plan to control your blood sugar levels while you are sick  You develop this plan with your diabetes care team   Have someone call your local emergency number (911 in the [address] [facility] [address]) if:   · You have trouble breathing  · You cannot be woken  · You are drowsy or confused  · You are breathing faster than usual     Seek care immediately if:   · You cannot keep food and liquids down at all for a few hours  · You are drowsy or confused  · You are breathing faster than usual     · Your heartbeat is faster than usual, or your heart is pounding  · You are weak or dizzy  Call your doctor or diabetes care team if:   · You have leg cramps  · Your mouth or eyes are dry  · You are vomiting or have diarrhea  · You have a fever  · Your ketone level is higher than healthcare providers have told you it should be  · Your blood sugar level is higher than healthcare providers have told you it should be  · You have questions or concerns about your condition or care  Why you need a sick day plan: Your blood sugar levels can increase because of stress from illness, surgery, or injury  Your plan will help prevent high blood sugar levels and other serious health conditions such as the following:  · Diabetic ketoacidosis (DKA)  is a condition that forces your body to use fat instead of sugar for fuel  DKA happens when your body does not have enough insulin and your blood sugar levels get very high  As fats are broken down, they leave chemicals called ketones that build up in your blood  Ketones are dangerous at high levels  DKA usually happens to people with type 1 diabetes  DKA can lead to coma, and can be life-threatening if not treated  · Hyperosmolar hyperglycemic syndrome (HHS)  is a condition that causes your body to get rid of extra sugar through your urine  This leads to severe dehydration   HHS happens to people with type 2 diabetes, especially older people  HHS can be life-threatening  What to do during sick days:   · Continue to take your medicines as directed  Your healthcare provider will tell you if you need to make any changes  If you normally do not use insulin, you may need to use it while you are sick  If you already use insulin, you may need to increase the amount you take  Talk to your provider before you take any over-the-counter medicines  · Check your blood sugar level more often than usual   If you have type 2 diabetes, check at least 4 times each day  If you have type 1 diabetes, check every 4 hours  · Check your urine or blood for ketones if you use insulin, and as directed  Ask your provider which type of ketone testing is best for you  Ketone urine test kits are sold in pharmacies and some stores  You can also buy a meter to check the amount of ketones in your blood  Ask when and how often to check ketones  · Drink liquids as directed  You may need to drink about 8 ounces (1 cup) of liquid each hour  Drink liquids that do not contain sugar or caffeine  Ask your provider which liquids are best for you  He or she may tell you that water is the best liquid to drink  · Follow your usual meal plan as closely as possible  If you cannot follow your meal plan, eat other foods that are easy for your body to digest  If you are eating less food than normal or cannot eat any foods, drink liquids that contain calories  · Tell others who help you while you are sick about your sick day plan  Put your plan in a place that is easy to find  Your sick day plan may change over time based on your needs  What to drink and eat while you are sick:  Prepare for sick days by having a small amount of non-diet drinks and foods at home  Have about 50 grams of carbohydrates every 3 to 4 hours for upset stomach, vomiting, or diarrhea   Each of the liquids and foods listed below has about 10 to 15 grams of carbohydrate  · Liquids:      ? ? to ½ cup of fruit juice    ? ½ cup of regular soda    ? 1 cup of milk    ? 1 double-stick popsicle    ? 1 cup of a sports drink    ? ½ cup of cream soup    · Foods:      ? ½ cup of regular gelatin    ? ¼ cup of regular pudding    ? ½ cup of mashed potatoes, macaroni, or noodles    ? ½ cup of regular ice cream or ¼ cup of sherbet    ? 1 slice of dry toast, 6 saltine crackers, or 3 larry crackers    Follow up with your doctor or diabetes care team as directed:  Write down your questions so you remember to ask them during your visits  © Copyright [facility] [address] Information is for End User's use only and may not be sold, redistributed or otherwise used for commercial purposes  All illustrations and images included in CareNotes® are the copyrighted property of [employer]  or [facility] Celine Coronado   The above information is an  only  It is not intended as medical advice for individual conditions or treatments  Talk to your doctor, nurse or pharmacist before following any medical regimen to see if it is safe and effective for you

## 2021-04-22 NOTE — PROGRESS NOTES
Daily Note     Today's date: 2021  Patient name: Guru Record  : 1970  MRN: [id number]  Referring provider: Lucio Trinh DO  Dx:   Encounter Diagnosis     ICD-10-CM    1  DDD (degenerative disc disease), lumbar  M51 36               Subjective: Pt reports he has no pain in his back right now  Objective: See treatment diary below      Assessment: Tolerated treatment well  No pain reported t/o session  Progressed to blue theraband with palloff press and black theraband with MTP/LTP  Patient would benefit from continued PT      Plan: Continue per plan of care        Re-eval Date: 21    Date 21   Visit Count 11 12 13 14 15   FOTO Completed   Completed            Precautions: chronic back pain       Manuals 21           Neuro Re-Ed         TA         TA + marches         TA + MTP/LTP  Green   2 x 10 ea  Green   2 x 10 ea  Green   2 x 10 ea  Black  2 x 10 ea    Palloff press   Green   5" x 10  Green   5" x 10  Green   5" x 10  Blue  5" x 10                            Ther Ex        NuStep  L4 10 minutes  L4   10 minutes  L 4  10 min  L 4  10 min  L4 10 minutes    LTR  HS stretch         SBB 5" x 10  5" x 10  5" x 10  5" x 10  5" x 10    Standing hip flex/abd/ext  Foam   2 x 10  Foam 2 x 10  Foam 2 x 10 ea  Foam 2 x 10 ea  Foam   2 x 10    Standing marches  Foam   2 x 10  Foam 2 x 10  Foam 2 x 10  Foam 2 x 10  Foam   2 x 10    Squats with postural awareness   X 10  1 x 10 1 x 10  X 10    Step-ups  Fwd 4"   2 x 10  Fwd 4"   2 x 10  B  2 x 10  C Fwd  2 x 10  C Fwd 2 x 10    SLR 2 x 10  3 x 10  3 x 10  3 x 10  3 x 10 onel    S/L SLR  2 x 10  2 x 10  2 x 10  2 x 10  2 x 10    Clamshells 2 x 10  2 x 10  2 x 10  2 x 10  2 x 10    Hip add         Bridges  2 x 10  2 x 10  2 x 10  2 x 10 with ball squeeze  2 x 10 with ball squeeze    Hip Abd TBand Blue x 20       TA + OH ball lift  X 10  Standing   X 10  Standing   X 10  Standing   X 10 Standing   X 10    Cat and camel         Quad alt UE        Quad alt LE   Attempted   R x 10   L unable    DC             Ther Activity                        Gait Training                        Modalities

## 2021-04-23 ENCOUNTER — APPOINTMENT (OUTPATIENT)
Dept: PHYSICAL THERAPY | Facility: HOME HEALTHCARE | Age: 51
End: 2021-04-23
Payer: COMMERCIAL

## 2021-04-28 ENCOUNTER — OFFICE VISIT (OUTPATIENT)
Dept: PHYSICAL THERAPY | Facility: HOME HEALTHCARE | Age: 51
End: 2021-04-28
Payer: COMMERCIAL

## 2021-04-28 DIAGNOSIS — M51.36 DDD (DEGENERATIVE DISC DISEASE), LUMBAR: Primary | ICD-10-CM

## 2021-04-28 PROCEDURE — 97110 THERAPEUTIC EXERCISES: CPT

## 2021-04-28 PROCEDURE — 97112 NEUROMUSCULAR REEDUCATION: CPT

## 2021-04-28 NOTE — PROGRESS NOTES
Daily Note     Today's date: 2021  Patient name: Bianca Mcintyre  : 1970  MRN: [id number]  Referring provider: Randall Robert DO  Dx:   Encounter Diagnosis     ICD-10-CM    1  DDD (degenerative disc disease), lumbar  M51 36               Subjective: Pt reports he has no pain in his back  Objective: See treatment diary below      Assessment: Tolerated treatment well  Progressed to foam with squats and increased reps with TA OH ball exercise  Added self HS and calf stretch to program today  Pt with no c/o pain in his back t/o session  Patient would benefit from continued PT      Plan: Continue per plan of care        Re-eval Date: 21    Date 21   Visit Count 16 12 13 14 15   FOTO Completed   Completed            Precautions: chronic back pain       Manuals 21    Self HS and calf St    30" x 3 Jamshid        Neuro Re-Ed         TA         TA + marches         TA + MTP/LTP Black   2 x 10 ea  Green   2 x 10 ea  Green   2 x 10 ea  Green   2 x 10 ea  Black  2 x 10 ea    Palloff press  Blue   5" x 10  Green   5" x 10  Green   5" x 10  Green   5" x 10  Blue  5" x 10                            Ther Ex        NuStep  L4 10 minutes  L4   10 minutes  L 4  10 min  L 4  10 min  L4 10 minutes    LTR  HS stretch         SBB 5" x 10  5" x 10  5" x 10  5" x 10  5" x 10    Standing hip flex/abd/ext  Foam   2 x 10  Foam 2 x 10  Foam 2 x 10 ea  Foam 2 x 10 ea  Foam   2 x 10    Standing marches  Foam   2 x 10  Foam 2 x 10  Foam 2 x 10  Foam 2 x 10  Foam   2 x 10    Squats with postural awareness  X 10 foam  X 10  1 x 10 1 x 10  X 10    Step-ups  Fwd C   2 x 10  Fwd 4"   2 x 10  B  2 x 10  C Fwd  2 x 10  C Fwd 2 x 10    SLR 3 x 10  3 x 10  3 x 10  3 x 10  3 x 10 jamshid    S/L SLR  2 x 10  2 x 10  2 x 10  2 x 10  2 x 10    Clamshells 2 x 10  2 x 10  2 x 10  2 x 10  2 x 10    Hip add         Bridges  2 x 10 with ball squeeze 2 x 10  2 x 10  2 x 10 with ball squeeze  2 x 10 with ball squeeze    Hip Abd TBand        TA + OH ball lift  X 20  standing Standing   X 10  Standing   X 10  Standing   X 10  Standing   X 10    Cat and camel         Quad alt UE        Quad alt LE   Attempted   R x 10   L unable    DC             Ther Activity                        Gait Training                        Modalities

## 2021-04-30 ENCOUNTER — HOSPITAL ENCOUNTER (OUTPATIENT)
Dept: MRI IMAGING | Facility: HOSPITAL | Age: 51
Discharge: HOME | End: 2021-04-30
Payer: COMMERCIAL

## 2021-04-30 ENCOUNTER — OFFICE VISIT (OUTPATIENT)
Dept: PHYSICAL THERAPY | Facility: HOME HEALTHCARE | Age: 51
End: 2021-04-30
Payer: COMMERCIAL

## 2021-04-30 DIAGNOSIS — M51.36 DDD (DEGENERATIVE DISC DISEASE), LUMBAR: Primary | ICD-10-CM

## 2021-04-30 DIAGNOSIS — M54.16 LUMBAR RADICULOPATHY: ICD-10-CM

## 2021-04-30 PROCEDURE — 72148 MRI LUMBAR SPINE W/O DYE: CPT

## 2021-04-30 PROCEDURE — 97112 NEUROMUSCULAR REEDUCATION: CPT

## 2021-04-30 PROCEDURE — 97110 THERAPEUTIC EXERCISES: CPT

## 2021-04-30 PROCEDURE — G1004 CDSM NDSC: HCPCS

## 2021-04-30 NOTE — PROGRESS NOTES
Daily Note     Today's date: 2021  Patient name: Jairo Breaux  : 1970  MRN: [id number]  Referring provider: Tayler Joshi DO  Dx:   Encounter Diagnosis     ICD-10-CM    1  DDD (degenerative disc disease), lumbar  M51 36                   Subjective: Pt reports he doesn't have much pain this morning  Pain of 1/10  Objective: See treatment diary below    Assessment: Tolerated treatment well  Pt denied increased pain but did report mild occasional short term shooting pain at L LE t/o Tx  Pt able to complete TE with minimal vc's and was able to to added heel walks without incident  Pt slowly improving with strength, endurance and pain free ROM  Patient would benefit from continued PT    Plan: Continue per plan of care        Re-eval Date: 21    Date 21   Visit Count 16 17 13 14 15   FOTO Completed   Completed            Precautions: chronic back pain       Manuals 21    Self HS and calf St    30" x 3 Noel  Self HS & calf   30" x 3      Neuro Re-Ed         TA         TA + marches         TA + MTP/LTP Black   2 x 10 ea  Black   2 x 10 ea  Green   2 x 10 ea  Green   2 x 10 ea  Black  2 x 10 ea    Palloff press  Blue   5" x 10  Blue   5" x 10  Green   5" x 10  Green   5" x 10  Blue  5" x 10                            Ther Ex        NuStep  L4 10 minutes  L4  10'  L 4  10 min  L 4  10 min  L4 10 minutes    LTR  HS stretch         SBB 5" x 10  5" x 10  5" x 10  5" x 10  5" x 10    Standing hip flex/abd/ext  Foam   2 x 10  Foam 2 x 10  Foam 2 x 10 ea  Foam 2 x 10 ea  Foam   2 x 10    Standing marches  Foam   2 x 10  Foam 2 x 10  Foam 2 x 10  Foam 2 x 10  Foam   2 x 10    Squats with postural awareness  X 10 foam  X 10 foam 1 x 10 1 x 10  X 10    Step-ups  Fwd C   2 x 10  C Fwd    2 x 10  B  2 x 10  C Fwd  2 x 10  C Fwd 2 x 10    SLR 3 x 10  3 x 10  3 x 10  3 x 10  3 x 10 noel    Heel walk  X 5      S/L SLR  2 x 10  X 20 2 x 10  2 x 10  2 x 10    Clamshells 2 x 10  X 20 2 x 10  2 x 10  2 x 10    Hip add         Bridges  2 x 10 with ball squeeze X 20   w/ball squeze 2 x 10  2 x 10 with ball squeeze  2 x 10 with ball squeeze    Hip Abd TBand        TA + OH ball lift  X 20  standing Standing   #4 med ball  X 20 Standing   X 10  Standing   X 10  Standing   X 10    Cat and camel         Quad alt UE        Quad alt LE      DC             Ther Activity                        Gait Training                        Modalities

## 2021-05-03 LAB
LEFT EYE DIABETIC RETINOPATHY: NORMAL
RIGHT EYE DIABETIC RETINOPATHY: NORMAL

## 2021-05-05 ENCOUNTER — APPOINTMENT (OUTPATIENT)
Dept: PHYSICAL THERAPY | Facility: HOME HEALTHCARE | Age: 51
End: 2021-05-05
Payer: COMMERCIAL

## 2021-05-07 ENCOUNTER — OFFICE VISIT (OUTPATIENT)
Dept: PHYSICAL THERAPY | Facility: HOME HEALTHCARE | Age: 51
End: 2021-05-07
Payer: COMMERCIAL

## 2021-05-07 DIAGNOSIS — M51.36 DDD (DEGENERATIVE DISC DISEASE), LUMBAR: Primary | ICD-10-CM

## 2021-05-07 PROCEDURE — 97110 THERAPEUTIC EXERCISES: CPT | Performed by: PHYSICAL THERAPIST

## 2021-05-07 PROCEDURE — 97112 NEUROMUSCULAR REEDUCATION: CPT | Performed by: PHYSICAL THERAPIST

## 2021-05-07 NOTE — PROGRESS NOTES
PT Discharge    Today's date: 2021  Patient name: Rashi Griffin  : 1970  MRN: [id number]  Referring provider: Janki Bryant DO  Dx:   Encounter Diagnosis     ICD-10-CM    1  DDD (degenerative disc disease), lumbar  M51 36                   Assessment  Assessment details: Pt Racheal Young is a 52 y o  who presents to OPPT with s/s consistent with chronic lumbar pain  PT notes pt with limited L/S ROM, decreased core and LE strength, postural dysfunction contributed to by soft tissue/mm restrictions, and increased pain with daily activities  Pt denies any radiating N/T into BLE at this time (mild numbness superior/inferior to L knee from prior incident)  Pt notes that he has trouble with bending and twisting, unable to tolerate lying on his stomach, difficulty with prolonged ambulating/standing, and disrupted sleeping  Pt would benefit from skilled therapy services to address outlined impairments, work towards goals, and restore pts PLOF  Thank you! UPDATE: Pt has been attending therapy x 2 months with consistent attendance  He is now demonstrating L/S ROM WFL and pain free in all motions  B LE strength with good improvement and WFL  He notes that he does continue to have feeling of "stiffness/tightness" in the back but overall pain significantly reduced  He will return to see MD again in 2-3 weeks  Pt to be D/C from OPPT as she has reached outlined goals  Thank you! Understanding of Dx/Px/POC: good   Prognosis: good    Goals  STGs to be achieved in 4 weeks:  -Pt to demonstrate reduced subjective pain rating "at worst" by at least 2-3 points from Initial Eval to allow for reduced pain with ADLs and improved functional activity tolerance  - MET  -Pt to demonstrate full AROM of L/S in order to maximize joint mobility and function and allow for progression of exercise program and achievement of goals   - ONGOING    -Pt to demonstrate increased MMT of BLE by at least 1/2-1 grade in order to improve safety and stability with ADLs and functional mobility  - MET    LTGs to be achieved in 6-8 weeks:  -Pt will be I with HEP in order to continue to improve quality of life and independence and reduce risk for re-injury  - MET  -Pt to demonstrate return to activities of daily living without limiations or restrictions  - MET  -Pt to demonstrate ability to bend and lift > 10# without an increase in symptoms - MET  -Pt to demonstrate improved function as noted by achieving or exceeding predicted score on FOTO outcomes assessment tool  - MET      Plan  Plan details: Pt to be DC from OPPT   Treatment plan discussed with: patient        Subjective Evaluation    History of Present Illness  Mechanism of injury: Pt notes that he is seeing good improvement with therapy but continues to have a lot of weakness in the legs  He will return to see MD again in 1-2 weeks  Quality of life: good    Pain  At best pain ratin  At worst pain rating: 3  Quality: dull ache and tight  Relieving factors: medications and ice (OTC prn )  Aggravating factors: standing  Progression: improved    Social Support    Employment status: not working    Diagnostic Tests  X-ray: abnormal  Treatments  Current treatment: physical therapy  Patient Goals  Patient goals for therapy: increased strength, increased motion, decreased pain, independence with ADLs/IADLs and improved balance          Objective     Concurrent Complaints  Negative for bladder dysfunction and bowel dysfunction    Postural Observations  Seated posture: fair  Standing posture: fair    Additional Postural Observation Details  Decreased lumbar lordosis     Palpation   Left   Tenderness of the erector spinae  Right   Tenderness of the erector spinae       Active Range of Motion     Lumbar   Flexion:  WFL  Extension:  Restriction level: minimal  Left lateral flexion:  WFL  Right lateral flexion:  [facility]    Strength/Myotome Testing     Left Hip   Planes of Motion   Flexion: 4  Abduction: 4  Adduction: 4    Right Hip   Planes of Motion   Flexion: 4+  Abduction: 4+  Adduction: 4+    Left Knee   Flexion: 4+  Extension: 4+    Right Knee   Flexion: 4+  Extension: 4+    Tests     Lumbar     Left   Negative crossed SLR and passive SLR  Right   Negative crossed SLR and passive SLR       General Comments:    Lower quarter screen   Hips: unremarkable    Hip Comments   B LE ROM [facility]              Date 4/28/21 4/30/21 5/7/21     Visit Count 16 17 18     FOTO Completed   Completed           Precautions: chronic back pain       Manuals 4/28/21 4/30/21 5/7      Self HS and calf St    30" x 3 Jamshid  Self HS & calf   30" x 3      Neuro Re-Ed         TA         TA + marches         TA + MTP/LTP Black   2 x 10 ea  Black   2 x 10 ea  Black 2 x 10 ea      Palloff press  Blue   5" x 10  Blue   5" x 10                               Ther Ex        NuStep  L4 10 minutes  L4  10'  L5 10 min      LTR  HS stretch         SBB 5" x 10  5" x 10  5" x 10      Standing hip flex/abd/ext  Foam   2 x 10  Foam 2 x 10  Foam 2 x 10      Standing marches  Foam   2 x 10  Foam 2 x 10  Foam 2 x 10      Squats with postural awareness  X 10 foam  X 10 foam X 10 foam      Step-ups  Fwd C   2 x 10  C Fwd    2 x 10  C fwd   X 20      SLR 3 x 10  3 x 10  3 x 10      Heel walk  X 5      S/L SLR  2 x 10  X 20 3 x 10      Clamshells 2 x 10  X 20 3 x 10      Hip add         Bridges  2 x 10 with ball squeeze X 20   w/ball squeze 3 x 10   W/ball squeeze      Hip Abd TBand        TA + OH ball lift  X 20  standing Standing   #4 med ball  X 20 Standing 4# med ball  X 20      Cat and camel         Quad alt UE        Quad alt LE                   Ther Activity                        Gait Training                        Modalities

## 2021-05-17 ENCOUNTER — OFFICE VISIT (OUTPATIENT)
Dept: PAIN MEDICINE | Facility: CLINIC | Age: 51
End: 2021-05-17
Payer: COMMERCIAL

## 2021-05-17 VITALS
HEIGHT: 71 IN | WEIGHT: 290.2 LBS | DIASTOLIC BLOOD PRESSURE: 74 MMHG | HEART RATE: 59 BPM | BODY MASS INDEX: 40.63 KG/M2 | TEMPERATURE: 97.7 F | SYSTOLIC BLOOD PRESSURE: 132 MMHG

## 2021-05-17 DIAGNOSIS — M79.18 MYOFASCIAL PAIN SYNDROME: ICD-10-CM

## 2021-05-17 DIAGNOSIS — M51.16 LUMBAR DISC DISEASE WITH RADICULOPATHY: Primary | ICD-10-CM

## 2021-05-17 DIAGNOSIS — M54.16 LUMBAR RADICULOPATHY: ICD-10-CM

## 2021-05-17 DIAGNOSIS — G89.4 CHRONIC PAIN SYNDROME: ICD-10-CM

## 2021-05-17 PROCEDURE — 1036F TOBACCO NON-USER: CPT | Performed by: ANESTHESIOLOGY

## 2021-05-17 PROCEDURE — 99214 OFFICE O/P EST MOD 30 MIN: CPT | Performed by: ANESTHESIOLOGY

## 2021-05-17 PROCEDURE — 3075F SYST BP GE 130 - 139MM HG: CPT | Performed by: ANESTHESIOLOGY

## 2021-05-17 PROCEDURE — 3008F BODY MASS INDEX DOCD: CPT | Performed by: ANESTHESIOLOGY

## 2021-05-17 PROCEDURE — 3078F DIAST BP <80 MM HG: CPT | Performed by: ANESTHESIOLOGY

## 2021-05-17 NOTE — PROGRESS NOTES
Assessment:  1  Lumbar disc disease with radiculopathy    2  Lumbar radiculopathy    3  Myofascial pain syndrome    4  Chronic pain syndrome        Plan:  Patient is a 44-year-old male with complaints of low back pain and left leg pain in the L4 nerve root distribution with chronic pain syndrome secondary to lumbar degenerative disc disease with radiculopathy, lumbar foraminal stenosis, lumbar spinal stenosis, lumbar facet arthropathy presents to office for follow-up visit  MRI lumbar spine is reviewed which shows spinal canal stenosis, left lateral recess stenosis at L3-4, right lateral recess stenosis at L4-5, paracentral disc protrusion at L5-S1 with encroachment of the right lateral recess and right foraminal   1  We will schedule patient for a left L3-4 transforaminal epidural steroid injection  2  Follow-up 1 month after injection would expect patient have increased radicular symptoms in the right lower extremity  Patient was instructed to pay attention to any increase radicular symptoms and the right leg or increase neurogenic claudication symptoms      Complete risks and benefits including bleeding, infection, tissue reaction, nerve injury and allergic reaction were discussed  The approach was demonstrated using models and literature was provided  Verbal and written consent was obtained  History of Present Illness: The patient is a 52 y o  male who presents for a follow up office visit in regards to Hip Pain and Leg Pain  The patients current symptoms include 2/10 constant dull/aching, cramping, and numbness in the left lower extremity in the L4 nerve root distribution  Current pain medications includes:  none  I have personally reviewed and/or updated the patient's past medical history, past surgical history, family history, social history, current medications, allergies, and vital signs today           Review of Systems  Review of Systems   Musculoskeletal: Positive for joint swelling (L Leg)  Joint stiffness  Pain in extremity - L Leg   All other systems reviewed and are negative          Past Medical History:   Diagnosis Date    Diabetes mellitus ([address] )     Hypertension     Kidney stones        Past Surgical History:   Procedure Laterality Date    CHOLECYSTECTOMY      NECK SURGERY         Family History   Problem Relation Age of Onset    Hypertension Mother     Diabetes Mother    Ender Pitts Breast cancer Mother    Ender Pitts Diabetes Father     Diabetes Sister     Fibromyalgia Sister        Social History     Occupational History    Occupation: EMPLOYED   Tobacco Use    Smoking status: Never Smoker    Smokeless tobacco: Never Used   Substance and Sexual Activity    Alcohol use: Never     Frequency: Never    Drug use: Never    Sexual activity: Not Currently         Current Outpatient Medications:     Ascorbic Acid (VITAMIN C) 1000 MG tablet, Take 1,000 mg by mouth daily, Disp: , Rfl:     atorvastatin (LIPITOR) 20 mg tablet, Take 1 tablet (20 mg total) by mouth daily, Disp: 90 tablet, Rfl: 3    Stacey Microlet Lancets lancets, Use as instructed, Disp: 100 each, Rfl: 1    Blood Glucose Monitoring Suppl ([employer] CONTOUR MONITOR) TRACEY, by Does not apply route daily, Disp: 1 Device, Rfl: 0    Cholecalciferol (VITAMIN D3) 101645 UNIT/GM POWD, by Does not apply route, Disp: , Rfl:     Dapagliflozin Propanediol 10 MG TABS, Take 1 tablet (10 mg total) by mouth daily (Patient not taking: Reported on 4/19/2021), Disp: 90 tablet, Rfl: 1    glimepiride (AMARYL) 4 mg tablet, Two po q day , Disp: 180 tablet, Rfl: 0    glucose blood (Contour Next Test) test strip, Check blood glucose BID, Disp: 100 each, Rfl: 1    metoprolol succinate (TOPROL-XL) 100 mg 24 hr tablet, Take 0 5 tablets (50 mg total) by mouth daily, Disp: 90 tablet, Rfl: 1    Allergies   Allergen Reactions    Codeine Nausea Only    Lisinopril      swelling    Metformin      GI    Penicillins Hives       Physical Exam:    /74 (BP Location: Left arm, Patient Position: Sitting, Cuff Size: Large)   Pulse 59   Temp 97 7 °F (36 5 °C)   Ht 5' 11" (1 803 m)   Wt 132 kg (290 lb 3 2 oz)   BMI 40 47 kg/m²     Constitutional:normal, well developed, well nourished, alert, in no distress and non-toxic and no overt pain behavior  and obese  Eyes:anicteric  HEENT:grossly intact  Neck:supple, symmetric, trachea midline and no masses   Pulmonary:even and unlabored  Cardiovascular:No edema or pitting edema present  Skin:Normal without rashes or lesions and well hydrated  Psychiatric:Mood and affect appropriate  Neurologic:Cranial Nerves II-XII grossly intact  Musculoskeletal:normal      Lumbar/Sacral Spine examination demonstrates  Decreased range of motion lumbar spine with pain upon: flexion, lateral rotation to the left/right, and bending to the left/right  Bilateral lumbar paraspinals tender to palpation  Muscle spasms noted in the lumbar area bilaterally  4/5 lower extremity strength in all muscle groups bilaterally  Positive seated straight leg raise for bilateral lower extremities  Sensitivity to light touch intact bilateral lower extremities  2+ reflexes in the patella and Achilles  No ankle clonus     Imaging  MRI LUMBAR SPINE WITHOUT CONTRAST     INDICATION: Radiculopathy      COMPARISON:  CT lumbar spine 3/13/2020     TECHNIQUE:  Sagittal T1, sagittal T2, sagittal inversion recovery, axial T1 and axial T2, coronal T2     IMAGE QUALITY:  Diagnostic     FINDINGS:     VERTEBRAL BODIES:  There are 5 lumbar type vertebral bodies  There is mild dextroscoliosis with the apex at L3-4  Modic type II endplate degenerative marrow changes at level L2-3 and L3-4  Vertebral heights are maintained      SACRUM:  Normal signal within the sacrum   No evidence of insufficiency or stress fracture      DISTAL CORD AND CONUS:  Normal size and signal within the distal cord and conus      PARASPINAL SOFT TISSUES:  Paraspinal soft tissues are unremarkable      Small right kidney T2 hyperintense focus statistically favoring benign cyst      LOWER THORACIC DISC SPACES:  Noncompressive lower thoracic degenerative change      LUMBAR DISC SPACES:     L1-L2:  There is right eccentric disc bulge  Mild bilateral facet arthropathy  There is right lateral recess narrowing and moderate canal stenosis  Mild to moderate right foraminal narrowing      L2-L3:  There is right eccentric disc bulge  Mild facet arthropathy  There is severe canal stenosis  Mild-to-moderate bilateral foraminal narrowing      L3-L4:  There is central disc protrusion with marginal spurring along with moderate bilateral facet arthropathy resulting in moderate left lateral recess narrowing with potential impact upon left L4 nerve root  There is mild canal stenosis  There is   left greater than right moderate bilateral foraminal stenosis      L4-L5:  Disc bulge and superimposed right paracentral/foraminal disc protrusion  Severe right and moderate left facet arthropathy  There is right lateral recess narrowing with slight displacement of right L5 nerve root  There is mild canal stenosis  Moderate bilateral foraminal stenosis      L5-S1:  There is central/right paracentral disc protrusion with marginal osteophytes  Moderate right facet arthropathy  There is right lateral recess narrowing with abutment of right S1 nerve root  Mild canal stenosis  Moderate right foraminal   stenosis      IMPRESSION:     1  Severe degenerative canal stenosis at L2-3      2  Right lateral recess narrowing and moderate canal stenosis at level L1-2      3   Lateral recesses narrowing and mild canal stenosis in the remaining levels as detailed above      4   Multilevel mild to moderate foraminal stenosis as described

## 2021-05-17 NOTE — LETTER
May 17, 2021     [facility] [address]    Patient: Sonali Mistry   YOB: 1970   Date of Visit: 5/17/2021       Dear Dr Katie Saucedo: Thank you for referring Kinga Garcia to me for evaluation  Below are my notes for this consultation  If you have questions, please do not hesitate to call me  I look forward to following your patient along with you  Sincerely,        Payton Ordonez MD        CC: No Recipients  Ari Draper Se, MD  5/17/2021 10:41 AM  Signed  Assessment:  1  Lumbar disc disease with radiculopathy    2  Lumbar radiculopathy    3  Myofascial pain syndrome    4  Chronic pain syndrome        Plan:  Patient is a 29-year-old male with complaints of low back pain and left leg pain in the L4 nerve root distribution with chronic pain syndrome secondary to lumbar degenerative disc disease with radiculopathy, lumbar foraminal stenosis, lumbar spinal stenosis, lumbar facet arthropathy presents to office for follow-up visit  MRI lumbar spine is reviewed which shows spinal canal stenosis, left lateral recess stenosis at L3-4, right lateral recess stenosis at L4-5, paracentral disc protrusion at L5-S1 with encroachment of the right lateral recess and right foraminal   1  We will schedule patient for a left L3-4 transforaminal epidural steroid injection  2  Follow-up 1 month after injection would expect patient have increased radicular symptoms in the right lower extremity  Patient was instructed to pay attention to any increase radicular symptoms and the right leg or increase neurogenic claudication symptoms      Complete risks and benefits including bleeding, infection, tissue reaction, nerve injury and allergic reaction were discussed  The approach was demonstrated using models and literature was provided  Verbal and written consent was obtained  History of Present Illness:   The patient is a 52 y o  male who presents for a follow up office visit in regards to Hip Pain and Leg Pain  The patients current symptoms include 2/10 constant dull/aching, cramping, and numbness in the left lower extremity in the L4 nerve root distribution  Current pain medications includes:  none  I have personally reviewed and/or updated the patient's past medical history, past surgical history, family history, social history, current medications, allergies, and vital signs today  Review of Systems  Review of Systems   Musculoskeletal: Positive for joint swelling (L Leg)  Joint stiffness  Pain in extremity - L Leg   All other systems reviewed and are negative          Past Medical History:   Diagnosis Date    Diabetes mellitus ([facility] [address] )     Hypertension     Kidney stones        Past Surgical History:   Procedure Laterality Date    CHOLECYSTECTOMY      NECK SURGERY         Family History   Problem Relation Age of Onset    Hypertension Mother     Diabetes Mother    Markell Crowley Breast cancer Mother    Markell Crowley Diabetes Father     Diabetes Sister     Fibromyalgia Sister        Social History     Occupational History    Occupation: EMPLOYED   Tobacco Use    Smoking status: Never Smoker    Smokeless tobacco: Never Used   Substance and Sexual Activity    Alcohol use: Never     Frequency: Never    Drug use: Never    Sexual activity: Not Currently         Current Outpatient Medications:     Ascorbic Acid (VITAMIN C) 1000 MG tablet, Take 1,000 mg by mouth daily, Disp: , Rfl:     atorvastatin (LIPITOR) 20 mg tablet, Take 1 tablet (20 mg total) by mouth daily, Disp: 90 tablet, Rfl: 3    Stacey Microlet Lancets lancets, Use as instructed, Disp: 100 each, Rfl: 1    Blood Glucose Monitoring Suppl ([employer] CONTOUR MONITOR) TRACEY, by Does not apply route daily, Disp: 1 Device, Rfl: 0    Cholecalciferol (VITAMIN D3) 632021 UNIT/GM POWD, by Does not apply route, Disp: , Rfl:     Dapagliflozin Propanediol 10 MG TABS, Take 1 tablet (10 mg total) by mouth daily (Patient not taking: Reported on 4/19/2021), Disp: 90 tablet, Rfl: 1    glimepiride (AMARYL) 4 mg tablet, Two po q day , Disp: 180 tablet, Rfl: 0    glucose blood (Contour Next Test) test strip, Check blood glucose BID, Disp: 100 each, Rfl: 1    metoprolol succinate (TOPROL-XL) 100 mg 24 hr tablet, Take 0 5 tablets (50 mg total) by mouth daily, Disp: 90 tablet, Rfl: 1    Allergies   Allergen Reactions    Codeine Nausea Only    Lisinopril      swelling    Metformin      GI    Penicillins Hives       Physical Exam:    /74 (BP Location: Left arm, Patient Position: Sitting, Cuff Size: Large)   Pulse 59   Temp 97 7 °F (36 5 °C)   Ht 5' 11" (1 803 m)   Wt 132 kg (290 lb 3 2 oz)   BMI 40 47 kg/m²     Constitutional:normal, well developed, well nourished, alert, in no distress and non-toxic and no overt pain behavior  and obese  Eyes:anicteric  HEENT:grossly intact  Neck:supple, symmetric, trachea midline and no masses   Pulmonary:even and unlabored  Cardiovascular:No edema or pitting edema present  Skin:Normal without rashes or lesions and well hydrated  Psychiatric:Mood and affect appropriate  Neurologic:Cranial Nerves II-XII grossly intact  Musculoskeletal:normal      Lumbar/Sacral Spine examination demonstrates  Decreased range of motion lumbar spine with pain upon: flexion, lateral rotation to the left/right, and bending to the left/right  Bilateral lumbar paraspinals tender to palpation  Muscle spasms noted in the lumbar area bilaterally  4/5 lower extremity strength in all muscle groups bilaterally  Positive seated straight leg raise for bilateral lower extremities  Sensitivity to light touch intact bilateral lower extremities  2+ reflexes in the patella and Achilles    No ankle clonus     Imaging  MRI LUMBAR SPINE WITHOUT CONTRAST     INDICATION: Radiculopathy      COMPARISON:  CT lumbar spine 3/13/2020     TECHNIQUE:  Sagittal T1, sagittal T2, sagittal inversion recovery, axial T1 and axial T2, coronal T2     IMAGE QUALITY:  Diagnostic     FINDINGS:     VERTEBRAL BODIES:  There are 5 lumbar type vertebral bodies  There is mild dextroscoliosis with the apex at L3-4  Modic type II endplate degenerative marrow changes at level L2-3 and L3-4  Vertebral heights are maintained      SACRUM:  Normal signal within the sacrum  No evidence of insufficiency or stress fracture      DISTAL CORD AND CONUS:  Normal size and signal within the distal cord and conus      PARASPINAL SOFT TISSUES:  Paraspinal soft tissues are unremarkable      Small right kidney T2 hyperintense focus statistically favoring benign cyst      LOWER THORACIC DISC SPACES:  Noncompressive lower thoracic degenerative change      LUMBAR DISC SPACES:     L1-L2:  There is right eccentric disc bulge  Mild bilateral facet arthropathy  There is right lateral recess narrowing and moderate canal stenosis  Mild to moderate right foraminal narrowing      L2-L3:  There is right eccentric disc bulge  Mild facet arthropathy  There is severe canal stenosis  Mild-to-moderate bilateral foraminal narrowing      L3-L4:  There is central disc protrusion with marginal spurring along with moderate bilateral facet arthropathy resulting in moderate left lateral recess narrowing with potential impact upon left L4 nerve root  There is mild canal stenosis  There is   left greater than right moderate bilateral foraminal stenosis      L4-L5:  Disc bulge and superimposed right paracentral/foraminal disc protrusion  Severe right and moderate left facet arthropathy  There is right lateral recess narrowing with slight displacement of right L5 nerve root  There is mild canal stenosis  Moderate bilateral foraminal stenosis      L5-S1:  There is central/right paracentral disc protrusion with marginal osteophytes  Moderate right facet arthropathy  There is right lateral recess narrowing with abutment of right S1 nerve root  Mild canal stenosis    Moderate right foraminal   stenosis      IMPRESSION:     1  Severe degenerative canal stenosis at L2-3      2  Right lateral recess narrowing and moderate canal stenosis at level L1-2      3   Lateral recesses narrowing and mild canal stenosis in the remaining levels as detailed above      4   Multilevel mild to moderate foraminal stenosis as described

## 2021-05-17 NOTE — H&P (VIEW-ONLY)
Assessment:  1  Lumbar disc disease with radiculopathy    2  Lumbar radiculopathy    3  Myofascial pain syndrome    4  Chronic pain syndrome        Plan:  Patient is a 57-year-old male with complaints of low back pain and left leg pain in the L4 nerve root distribution with chronic pain syndrome secondary to lumbar degenerative disc disease with radiculopathy, lumbar foraminal stenosis, lumbar spinal stenosis, lumbar facet arthropathy presents to office for follow-up visit  MRI lumbar spine is reviewed which shows spinal canal stenosis, left lateral recess stenosis at L3-4, right lateral recess stenosis at L4-5, paracentral disc protrusion at L5-S1 with encroachment of the right lateral recess and right foraminal   1  We will schedule patient for a left L3-4 transforaminal epidural steroid injection  2  Follow-up 1 month after injection would expect patient have increased radicular symptoms in the right lower extremity  Patient was instructed to pay attention to any increase radicular symptoms and the right leg or increase neurogenic claudication symptoms      Complete risks and benefits including bleeding, infection, tissue reaction, nerve injury and allergic reaction were discussed  The approach was demonstrated using models and literature was provided  Verbal and written consent was obtained  History of Present Illness: The patient is a 52 y o  male who presents for a follow up office visit in regards to Hip Pain and Leg Pain  The patients current symptoms include 2/10 constant dull/aching, cramping, and numbness in the left lower extremity in the L4 nerve root distribution  Current pain medications includes:  none  I have personally reviewed and/or updated the patient's past medical history, past surgical history, family history, social history, current medications, allergies, and vital signs today           Review of Systems  Review of Systems   Musculoskeletal: Positive for joint swelling (L Leg)  Joint stiffness  Pain in extremity - L Leg   All other systems reviewed and are negative          Past Medical History:   Diagnosis Date    Diabetes mellitus ([address] )     Hypertension     Kidney stones        Past Surgical History:   Procedure Laterality Date    CHOLECYSTECTOMY      NECK SURGERY         Family History   Problem Relation Age of Onset    Hypertension Mother     Diabetes Mother    Yair Herbert Breast cancer Mother    Yair Herbert Diabetes Father     Diabetes Sister     Fibromyalgia Sister        Social History     Occupational History    Occupation: EMPLOYED   Tobacco Use    Smoking status: Never Smoker    Smokeless tobacco: Never Used   Substance and Sexual Activity    Alcohol use: Never     Frequency: Never    Drug use: Never    Sexual activity: Not Currently         Current Outpatient Medications:     Ascorbic Acid (VITAMIN C) 1000 MG tablet, Take 1,000 mg by mouth daily, Disp: , Rfl:     atorvastatin (LIPITOR) 20 mg tablet, Take 1 tablet (20 mg total) by mouth daily, Disp: 90 tablet, Rfl: 3    Stacey Microlet Lancets lancets, Use as instructed, Disp: 100 each, Rfl: 1    Blood Glucose Monitoring Suppl ([employer] CONTOUR MONITOR) TRACEY, by Does not apply route daily, Disp: 1 Device, Rfl: 0    Cholecalciferol (VITAMIN D3) 008557 UNIT/GM POWD, by Does not apply route, Disp: , Rfl:     Dapagliflozin Propanediol 10 MG TABS, Take 1 tablet (10 mg total) by mouth daily (Patient not taking: Reported on 4/19/2021), Disp: 90 tablet, Rfl: 1    glimepiride (AMARYL) 4 mg tablet, Two po q day , Disp: 180 tablet, Rfl: 0    glucose blood (Contour Next Test) test strip, Check blood glucose BID, Disp: 100 each, Rfl: 1    metoprolol succinate (TOPROL-XL) 100 mg 24 hr tablet, Take 0 5 tablets (50 mg total) by mouth daily, Disp: 90 tablet, Rfl: 1    Allergies   Allergen Reactions    Codeine Nausea Only    Lisinopril      swelling    Metformin      GI    Penicillins Hives       Physical Exam:    /74 (BP Location: Left arm, Patient Position: Sitting, Cuff Size: Large)   Pulse 59   Temp 97 7 °F (36 5 °C)   Ht 5' 11" (1 803 m)   Wt 132 kg (290 lb 3 2 oz)   BMI 40 47 kg/m²     Constitutional:normal, well developed, well nourished, alert, in no distress and non-toxic and no overt pain behavior  and obese  Eyes:anicteric  HEENT:grossly intact  Neck:supple, symmetric, trachea midline and no masses   Pulmonary:even and unlabored  Cardiovascular:No edema or pitting edema present  Skin:Normal without rashes or lesions and well hydrated  Psychiatric:Mood and affect appropriate  Neurologic:Cranial Nerves II-XII grossly intact  Musculoskeletal:normal      Lumbar/Sacral Spine examination demonstrates  Decreased range of motion lumbar spine with pain upon: flexion, lateral rotation to the left/right, and bending to the left/right  Bilateral lumbar paraspinals tender to palpation  Muscle spasms noted in the lumbar area bilaterally  4/5 lower extremity strength in all muscle groups bilaterally  Positive seated straight leg raise for bilateral lower extremities  Sensitivity to light touch intact bilateral lower extremities  2+ reflexes in the patella and Achilles  No ankle clonus     Imaging  MRI LUMBAR SPINE WITHOUT CONTRAST     INDICATION: Radiculopathy      COMPARISON:  CT lumbar spine 3/13/2020     TECHNIQUE:  Sagittal T1, sagittal T2, sagittal inversion recovery, axial T1 and axial T2, coronal T2     IMAGE QUALITY:  Diagnostic     FINDINGS:     VERTEBRAL BODIES:  There are 5 lumbar type vertebral bodies  There is mild dextroscoliosis with the apex at L3-4  Modic type II endplate degenerative marrow changes at level L2-3 and L3-4  Vertebral heights are maintained      SACRUM:  Normal signal within the sacrum   No evidence of insufficiency or stress fracture      DISTAL CORD AND CONUS:  Normal size and signal within the distal cord and conus      PARASPINAL SOFT TISSUES:  Paraspinal soft tissues are unremarkable      Small right kidney T2 hyperintense focus statistically favoring benign cyst      LOWER THORACIC DISC SPACES:  Noncompressive lower thoracic degenerative change      LUMBAR DISC SPACES:     L1-L2:  There is right eccentric disc bulge  Mild bilateral facet arthropathy  There is right lateral recess narrowing and moderate canal stenosis  Mild to moderate right foraminal narrowing      L2-L3:  There is right eccentric disc bulge  Mild facet arthropathy  There is severe canal stenosis  Mild-to-moderate bilateral foraminal narrowing      L3-L4:  There is central disc protrusion with marginal spurring along with moderate bilateral facet arthropathy resulting in moderate left lateral recess narrowing with potential impact upon left L4 nerve root  There is mild canal stenosis  There is   left greater than right moderate bilateral foraminal stenosis      L4-L5:  Disc bulge and superimposed right paracentral/foraminal disc protrusion  Severe right and moderate left facet arthropathy  There is right lateral recess narrowing with slight displacement of right L5 nerve root  There is mild canal stenosis  Moderate bilateral foraminal stenosis      L5-S1:  There is central/right paracentral disc protrusion with marginal osteophytes  Moderate right facet arthropathy  There is right lateral recess narrowing with abutment of right S1 nerve root  Mild canal stenosis  Moderate right foraminal   stenosis      IMPRESSION:     1  Severe degenerative canal stenosis at L2-3      2  Right lateral recess narrowing and moderate canal stenosis at level L1-2      3   Lateral recesses narrowing and mild canal stenosis in the remaining levels as detailed above      4   Multilevel mild to moderate foraminal stenosis as described

## 2021-05-20 ENCOUNTER — TELEPHONE (OUTPATIENT)
Dept: PAIN MEDICINE | Facility: CLINIC | Age: 51
End: 2021-05-20

## 2021-05-20 NOTE — TELEPHONE ENCOUNTER
S/w pt and scheduled his procedure with Dr Nadia Yoon on 6/3  Went over pre procedure instructions and mailed out instruction form as well  No eating/drinking 1 hour prior - pt advised the OR will call him the day before to let him know what time he has to be there  Appropriate clothing   is required  Take medications as usual - pt confirmed no blood thinners  If you get sick or abx, call  No vaccines 2 weeks before/after  Pt verbalized understanding & is scheduled for a f/u w Lianne on 7/1

## 2021-05-28 DIAGNOSIS — E11.9 TYPE 2 DIABETES MELLITUS WITHOUT COMPLICATION, WITHOUT LONG-TERM CURRENT USE OF INSULIN (HCC): ICD-10-CM

## 2021-05-28 DIAGNOSIS — Z78.9 DRUG INTOLERANCE: ICD-10-CM

## 2021-05-28 DIAGNOSIS — I10 ESSENTIAL HYPERTENSION: ICD-10-CM

## 2021-05-28 RX ORDER — GLIMEPIRIDE 4 MG/1
TABLET ORAL
Qty: 180 TABLET | Refills: 0 | Status: SHIPPED | OUTPATIENT
Start: 2021-05-28 | End: 2021-08-19

## 2021-06-03 ENCOUNTER — APPOINTMENT (OUTPATIENT)
Dept: RADIOLOGY | Facility: HOSPITAL | Age: 51
End: 2021-06-03
Payer: COMMERCIAL

## 2021-06-03 ENCOUNTER — HOSPITAL ENCOUNTER (OUTPATIENT)
Facility: HOSPITAL | Age: 51
Setting detail: OUTPATIENT SURGERY
Discharge: HOME | End: 2021-06-03
Attending: ANESTHESIOLOGY | Admitting: ANESTHESIOLOGY
Payer: COMMERCIAL

## 2021-06-03 VITALS
TEMPERATURE: 98.6 F | HEART RATE: 67 BPM | SYSTOLIC BLOOD PRESSURE: 160 MMHG | OXYGEN SATURATION: 97 % | RESPIRATION RATE: 18 BRPM | DIASTOLIC BLOOD PRESSURE: 77 MMHG

## 2021-06-03 DIAGNOSIS — M51.16 LUMBAR DISC DISEASE WITH RADICULOPATHY: ICD-10-CM

## 2021-06-03 PROCEDURE — 64483 NJX AA&/STRD TFRM EPI L/S 1: CPT | Performed by: ANESTHESIOLOGY

## 2021-06-03 RX ORDER — DEXAMETHASONE SODIUM PHOSPHATE 10 MG/ML
INJECTION, SOLUTION INTRAMUSCULAR; INTRAVENOUS AS NEEDED
Status: DISCONTINUED | OUTPATIENT
Start: 2021-06-03 | End: 2021-06-03 | Stop reason: HOSPADM

## 2021-06-03 RX ORDER — LIDOCAINE HYDROCHLORIDE 10 MG/ML
INJECTION, SOLUTION EPIDURAL; INFILTRATION; INTRACAUDAL; PERINEURAL AS NEEDED
Status: DISCONTINUED | OUTPATIENT
Start: 2021-06-03 | End: 2021-06-03 | Stop reason: HOSPADM

## 2021-06-03 NOTE — OP NOTE
OPERATIVE REPORT  PATIENT NAME: Eddie Conrad    :  1970  MRN: [id number]  Pt Location: [facility] OR ROOM 01    SURGERY DATE: 6/3/2021    Surgeon(s) and Role:     * Graham Griffin MD - Primary    Preop Diagnosis:  Lumbar disc disease with radiculopathy [M51 16]    Post-Op Diagnosis Codes:     * Lumbar disc disease with radiculopathy [M51 16]    Procedure(s) (LRB):  Left L3-4 transforaminal epidural steroid injection (Left)    Specimen(s):  * No specimens in log *    Estimated Blood Loss:   Minimal    Drains:  * No LDAs found *    Anesthesia Type:   Local    Operative Indications:  Lumbar disc disease with radiculopathy [M51 16]      Operative Findings:  same    Complications:   None    Procedure and Technique:  Fluoroscopically-guided left L3-4 transforaminal epidural steroid injection under fluoroscopy      After discussing the risks, benefits, and alternatives to the procedure, the patient expressed understanding and wished to proceed  The patient was brought to the fluoroscopy suite and placed in the prone position  A procedural pause was conducted to verify:  correct patient identity, procedure to be performed and as applicable, correct side and site, correct patient position, and availability of implants, special equipment and special requirements  After identifying the left L3 pedicle fluoroscopically with an oblique view, the skin was sterilely prepped and draped in the usual fashion using Chloraprep skin prep  The skin and subcutaneous tissue were anesthetized with 0 5% lidocaine  A  5 inch 22 gauge spinal needle was then advanced under fluoroscopic guidance to the posterior aspect of the left L3-4 neural foramen  Appropriate foraminal depth was determined with a lateral fluoroscopic view, and AP visualization confirmed needle positioning at approximately the 6 oclock position relative to the pedicle    After negative aspiration, 1 mL Omnipaque 300 contrast was injected using live fluoroscopy/digital subtraction angiography, confirming appropriate transforaminal spread without evidence of intravascular or intrathecal uptake  Next, a local anesthetic test dose consisting of 1 mL of 2% lidocaine was injected through the needle  After an appropriate period of observation, a directed neurological exam was performed which revealed no new neurologic deficits  Next, a 1 5 ml solution consisting of 7 5 mg of dexamethasone in sterile saline was injected slowly and incrementally into the epidural space  Following the injection the needle was withdrawn slightly and flushed with lidocaine as it was fully extracted  The patient tolerated the procedure well and there were no apparent complications  The patient did not develop any new neurologic deficits  After appropriate observation, the patient was dismissed from the clinic in good condition under their own power  COMMENTS  The patient received a total steroid dose of 7 5 mg of dexamethasone     I was present for the entire procedure    Patient Disposition:  hemodynamically stable    SIGNATURE: Alon Edmonds MD  DATE: Michell 3, 2021  TIME: 10:47 AM

## 2021-06-03 NOTE — DISCHARGE INSTRUCTIONS
Epidural Steroid Injection   WHAT YOU NEED TO KNOW:   An epidural steroid injection (NADINE) is a procedure to inject steroid medicine into the epidural space  The epidural space is between your spinal cord and vertebrae  Steroids reduce inflammation and fluid buildup in your spine that may be causing pain  You may be given pain medicine along with the steroids  ACTIVITY  · Do not drive or operate machinery today  · No strenuous activity today - bending, lifting, etc   · You may resume normal activites starting tomorrow - start slowly and as tolerated  · You may shower today, but no tub baths or hot tubs  · You may have numbness for several hours from the local anesthetic  Please use caution and common sense, especially with weight-bearing activities  CARE OF THE INJECTION SITE  · If you have soreness or pain, apply ice to the area today (20 minutes on/20 minutes off)  · Starting tomorrow, you may use warm, moist heat or ice if needed  · You may have an increase or change in your discomfort for 36-48 hours after your treatment  · Apply ice and continue with any pain medication you have been prescribed  · Notify [facility] if you have any of the following: redness, drainage, swelling, headache, stiff neck or fever above 100°F     SPECIAL INSTRUCTIONS  · Our office will contact you in approximately 7 days for a progress report  MEDICATIONS  · Continue to take all routine medications  · Our office may have instructed you to hold some medications  As no general anesthesia was used in today's procedure, you should not experience any side effects related to anesthesia  If you have a problem specifically related to your procedure, please call our office at [phone]  Problems not related to your procedure should be directed to your primary care physician

## 2021-06-03 NOTE — INTERVAL H&P NOTE
H&P reviewed  After examining the patient I find no changes in the patients condition since the H&P had been written      Vitals:    06/03/21 1038   BP: 140/74   Pulse: 71   Resp: 18   Temp: 98 6 °F (37 °C)   SpO2: 94%

## 2021-06-10 ENCOUNTER — TELEPHONE (OUTPATIENT)
Dept: PAIN MEDICINE | Facility: CLINIC | Age: 51
End: 2021-06-10

## 2021-06-22 ENCOUNTER — OFFICE VISIT (OUTPATIENT)
Dept: INTERNAL MEDICINE CLINIC | Facility: CLINIC | Age: 51
End: 2021-06-22
Payer: COMMERCIAL

## 2021-06-22 VITALS
SYSTOLIC BLOOD PRESSURE: 132 MMHG | TEMPERATURE: 97.6 F | HEART RATE: 60 BPM | WEIGHT: 286.13 LBS | DIASTOLIC BLOOD PRESSURE: 78 MMHG | HEIGHT: 71 IN | OXYGEN SATURATION: 95 % | BODY MASS INDEX: 40.06 KG/M2

## 2021-06-22 DIAGNOSIS — D69.6 THROMBOCYTOPENIA (HCC): ICD-10-CM

## 2021-06-22 DIAGNOSIS — E78.2 MIXED HYPERLIPIDEMIA: ICD-10-CM

## 2021-06-22 DIAGNOSIS — I10 ESSENTIAL HYPERTENSION: ICD-10-CM

## 2021-06-22 DIAGNOSIS — E11.9 TYPE 2 DIABETES MELLITUS WITHOUT COMPLICATION, WITHOUT LONG-TERM CURRENT USE OF INSULIN (HCC): Primary | ICD-10-CM

## 2021-06-22 DIAGNOSIS — G89.4 CHRONIC PAIN SYNDROME: ICD-10-CM

## 2021-06-22 DIAGNOSIS — Z11.59 NEED FOR HEPATITIS C SCREENING TEST: ICD-10-CM

## 2021-06-22 LAB — SL AMB POCT HEMOGLOBIN AIC: 9.1 (ref ?–6.5)

## 2021-06-22 PROCEDURE — 83036 HEMOGLOBIN GLYCOSYLATED A1C: CPT | Performed by: INTERNAL MEDICINE

## 2021-06-22 PROCEDURE — 3008F BODY MASS INDEX DOCD: CPT | Performed by: INTERNAL MEDICINE

## 2021-06-22 PROCEDURE — 1036F TOBACCO NON-USER: CPT | Performed by: INTERNAL MEDICINE

## 2021-06-22 PROCEDURE — 3725F SCREEN DEPRESSION PERFORMED: CPT | Performed by: INTERNAL MEDICINE

## 2021-06-22 PROCEDURE — 99214 OFFICE O/P EST MOD 30 MIN: CPT | Performed by: INTERNAL MEDICINE

## 2021-06-22 PROCEDURE — 3078F DIAST BP <80 MM HG: CPT | Performed by: INTERNAL MEDICINE

## 2021-06-22 PROCEDURE — 3075F SYST BP GE 130 - 139MM HG: CPT | Performed by: INTERNAL MEDICINE

## 2021-06-22 NOTE — PATIENT INSTRUCTIONS
Hyperlipidemia   WHAT YOU NEED TO KNOW:   What is hyperlipidemia? Hyperlipidemia is a high level of lipids (fats) in your blood  These lipids include cholesterol or triglycerides  Lipids are made by your body  They also come from the foods you eat  Your body needs lipids to work properly, but high levels increase your risk for heart disease, heart attack, and stroke  What increases my risk for hyperlipidemia? · Family history of high lipid levels    · Diet high in saturated fats, cholesterol, or calories     · High alcohol intake or smoking    · Lack of regular physical activity    · Medical conditions such as hypothyroidism, obesity, or type 2 diabetes    · Certain medicines, such as blood pressure medicines, hormones, and steroids    How is hyperlipidemia managed and treated? Your healthcare provider may first recommend that you make lifestyle changes to help decrease your lipid levels  You may also need to take medicine to lower your lipid levels  Some of the lifestyle changes you may need to make include the following:  · Maintain a healthy weight  Ask your healthcare provider how much you should weigh  Ask him or her to help you create a weight loss plan if you are overweight  Weight loss can decrease your cholesterol and triglyceride levels  · Exercise as directed  Exercise lowers your cholesterol levels and helps you maintain a healthy weight  Get 30 minutes or more of aerobic exercise 4 to 6 days each week  You can split your exercise into four 10-minute workouts instead of 30 minutes at one time  Examples of aerobic exercises include walking briskly, swimming, or riding a bike  Work with your healthcare provider to plan the best exercise program for you  · Do not smoke  Nicotine and other chemicals in cigarettes and cigars can increase your risk for a heart attack and stroke  Ask your healthcare provider for information if you currently smoke and need help to quit   E-cigarettes or smokeless tobacco still contain nicotine  Talk to your healthcare provider before you use these products  · Eat heart-healthy foods  Talk to your dietitian about a heart-healthy diet  The following will help you manage hyperlipidemia:     ? Decrease the total amount of fat you eat  Choose lean meats, fat-free or 1% fat milk, and low-fat dairy products, such as yogurt and cheese  Limit or do not eat red meat  Red meats are high in fat and cholesterol  ? Replace unhealthy fats with healthy fats  Unhealthy fats include saturated fat, trans fat, and cholesterol  Choose soft margarines that are low in saturated fat and have little or no trans fat  Monounsaturated fats are healthy fats  These are found in olive oil, canola oil, avocado, and nuts  Polyunsaturated fats are also healthy  These are found in fish, flaxseed, walnuts, and soybeans  ? Eat 5 or more servings of fruits and vegetables every day  They are low in calories and fat, and a good source of essential vitamins  Include dark green, red, and orange vegetables  Examples include spinach, kale, broccoli, and carrots  ? Eat foods high in fiber  Fiber can help lower your cholesterol levels  Choose whole grain, high-fiber foods  Good choices include whole-wheat breads or cereals, beans, peas, fruits, and vegetables  · Ask your healthcare provider if it is safe for you to drink alcohol  Alcohol can increase your cholesterol and triglyceride levels  A drink of alcohol is 12 ounces of beer, 5 ounces of wine, or 1½ ounces of liquor  Call 911 for any of the following:   · You have any of the following signs of a heart attack:      ? Squeezing, pressure, or pain in your chest    ? You may  also have any of the following:     ? Discomfort or pain in your back, neck, jaw, stomach, or arm    ? Shortness of breath    ? Nausea or vomiting    ?  Lightheadedness or a sudden cold sweat    · You have any of the following signs of a stroke:      ? Numbness or drooping on one side of your face     ? Weakness in an arm or leg    ? Confusion or difficulty speaking    ? Dizziness, a severe headache, or vision loss    When should I contact my healthcare provider? · You have questions or concerns about your condition or care  CARE AGREEMENT:   You have the right to help plan your care  Learn about your health condition and how it may be treated  Discuss treatment options with your healthcare providers to decide what care you want to receive  You always have the right to refuse treatment  The above information is an  only  It is not intended as medical advice for individual conditions or treatments  Talk to your doctor, nurse or pharmacist before following any medical regimen to see if it is safe and effective for you  © Copyright [address] Information is for End User's use only and may not be sold, redistributed or otherwise used for commercial purposes   All illustrations and images included in CareNotes® are the copyrighted property of [employer]  or [address] [place] [address]

## 2021-06-22 NOTE — PROGRESS NOTES
Assessment/Plan:  Problem List Items Addressed This Visit        Endocrine    Type 2 diabetes mellitus without complication ([facility] [address] ) - Primary    Relevant Orders    Comprehensive metabolic panel    Lipid Panel with Direct LDL reflex    POCT hemoglobin A1c (Completed)       Cardiovascular and Mediastinum    Essential hypertension       Other    Thrombocytopenia (HCC)    Mixed hyperlipidemia    Chronic pain syndrome           Diagnoses and all orders for this visit:    Type 2 diabetes mellitus without complication, without long-term current use of insulin (HCC)  -     Comprehensive metabolic panel; Future  -     Lipid Panel with Direct LDL reflex; Future  -     POCT hemoglobin A1c    Essential hypertension    Mixed hyperlipidemia    Chronic pain syndrome    Thrombocytopenia (HCC)        No problem-specific Assessment & Plan notes found for this encounter  A/P: Doing ok but HgA1c was 9 1  Doesn't correspond to his home readings  Pt insists none of his readings are over 200  Will check a serum HgA1c as well  Check routine labs  Reports problems with getting the SGLT2  Will re try and if insurance doesn't cover, may need injectable  Did have some epidurals, but sugars should not still be elevated  Continue current treatment and RTC one month for f/u, but will call in two days to see if pt was able to get the meds  Subjective:      Patient ID: Jairo Breaux is a 54 y o  male  WM RTC for f/u DM, HTN etc  Doing ok and no c/o's  Remains active w/o difficulty and no falls  Sugars less than 140 and no low sugars  Chronic pain is manageable  Due for labs  The following portions of the patient's history were reviewed and updated as appropriate:   He has a past medical history of Diabetes mellitus ([facility] [address] ), Hyperlipidemia, Hypertension, and Kidney stones  ,  does not have any pertinent problems on file  ,   has a past surgical history that includes Neck surgery; Cholecystectomy;  Epidural block injection (Left, 6/3/2021); and FL guided needle plac bx/asp/inj (6/3/2021)  ,  family history includes Breast cancer in his mother; Diabetes in his father, mother, and sister; Fibromyalgia in his sister; Hypertension in his mother  ,   reports that he has never smoked  He has never used smokeless tobacco  He reports that he does not drink alcohol and does not use drugs  ,  is allergic to codeine, lisinopril, metformin, and penicillins     Current Outpatient Medications   Medication Sig Dispense Refill    Ascorbic Acid (VITAMIN C) 1000 MG tablet Take 1,000 mg by mouth daily      atorvastatin (LIPITOR) 20 mg tablet Take 1 tablet (20 mg total) by mouth daily 90 tablet 3    Cholecalciferol (VITAMIN D3) 671988 UNIT/GM POWD by Does not apply route      glimepiride (AMARYL) 4 mg tablet Two po q day  180 tablet 0    metoprolol succinate (TOPROL-XL) 100 mg 24 hr tablet Take 0 5 tablets (50 mg total) by mouth daily 90 tablet 1    Stacey Microlet Lancets lancets Use as instructed 100 each 1    Blood Glucose Monitoring Suppl ([employer] CONTOUR MONITOR) TRACEY by Does not apply route daily 1 Device 0    Dapagliflozin Propanediol 10 MG TABS Take 1 tablet (10 mg total) by mouth daily (Patient not taking: Reported on 4/19/2021) 90 tablet 1    glucose blood (Contour Next Test) test strip Check blood glucose  each 1     No current facility-administered medications for this visit  Review of Systems   Constitutional: Negative for activity change, chills, diaphoresis, fatigue and fever  HENT: Negative  Eyes: Negative for visual disturbance  Respiratory: Negative for cough, chest tightness, shortness of breath and wheezing  Cardiovascular: Negative for chest pain, palpitations and leg swelling  Gastrointestinal: Negative for abdominal pain, constipation, diarrhea, nausea and vomiting  Endocrine: Negative for cold intolerance and heat intolerance  Genitourinary: Negative for difficulty urinating, dysuria and frequency  Musculoskeletal: Positive for back pain  Negative for arthralgias, gait problem and myalgias  Neurological: Negative for dizziness, seizures, syncope, weakness, light-headedness and headaches  Psychiatric/Behavioral: Negative for confusion, dysphoric mood and sleep disturbance  The patient is not nervous/anxious  PHQ-9 Depression Screening    PHQ-9:   Frequency of the following problems over the past two weeks:      Little interest or pleasure in doing things: 0 - not at all  Feeling down, depressed, or hopeless: 0 - not at all  PHQ-2 Score: 0        Objective:  Vitals:    06/22/21 0800   BP: 132/78   Pulse: 60   Temp: 97 6 °F (36 4 °C)   SpO2: 95%   Weight: 130 kg (286 lb 2 oz)   Height: 5' 11" (1 803 m)     Body mass index is 39 91 kg/m²  Physical Exam  Vitals and nursing note reviewed  Constitutional:       General: He is not in acute distress  Appearance: Normal appearance  He is not ill-appearing  HENT:      Head: Normocephalic and atraumatic  Mouth/Throat:      Mouth: Mucous membranes are moist    Eyes:      Extraocular Movements: Extraocular movements intact  Conjunctiva/sclera: Conjunctivae normal       Pupils: Pupils are equal, round, and reactive to light  Neck:      Vascular: No carotid bruit  Cardiovascular:      Rate and Rhythm: Normal rate and regular rhythm  Heart sounds: Normal heart sounds  Pulmonary:      Effort: Pulmonary effort is normal  No respiratory distress  Breath sounds: Normal breath sounds  No wheezing or rales  Abdominal:      General: Bowel sounds are normal  There is no distension  Palpations: Abdomen is soft  Tenderness: There is no abdominal tenderness  Musculoskeletal:      Cervical back: Neck supple  Right lower leg: No edema  Left lower leg: No edema  Neurological:      General: No focal deficit present  Mental Status: He is alert and oriented to person, place, and time   Mental status is at baseline  Psychiatric:         Mood and Affect: Mood normal          Behavior: Behavior normal          Thought Content:  Thought content normal          Judgment: Judgment normal

## 2021-06-24 ENCOUNTER — TELEPHONE (OUTPATIENT)
Dept: INTERNAL MEDICINE CLINIC | Facility: CLINIC | Age: 51
End: 2021-06-24

## 2021-06-24 NOTE — TELEPHONE ENCOUNTER
----- Message from Carmen Winslow DO sent at 6/22/2021  8:22 AM EDT -----  Call pt in two days and find out if the insurance covered the jardiance

## 2021-06-26 ENCOUNTER — APPOINTMENT (OUTPATIENT)
Dept: LAB | Facility: HOSPITAL | Age: 51
End: 2021-06-26
Attending: INTERNAL MEDICINE
Payer: COMMERCIAL

## 2021-06-26 DIAGNOSIS — E11.9 TYPE 2 DIABETES MELLITUS WITHOUT COMPLICATION, WITHOUT LONG-TERM CURRENT USE OF INSULIN (HCC): ICD-10-CM

## 2021-06-26 DIAGNOSIS — Z11.59 NEED FOR HEPATITIS C SCREENING TEST: ICD-10-CM

## 2021-06-26 LAB
ALBUMIN SERPL BCP-MCNC: 3.2 G/DL (ref 3.5–5)
ALP SERPL-CCNC: 162 U/L (ref 46–116)
ALT SERPL W P-5'-P-CCNC: 55 U/L (ref 12–78)
ANION GAP SERPL CALCULATED.3IONS-SCNC: 5 MMOL/L (ref 4–13)
AST SERPL W P-5'-P-CCNC: 33 U/L (ref 5–45)
BILIRUB SERPL-MCNC: 0.68 MG/DL (ref 0.2–1)
BUN SERPL-MCNC: 12 MG/DL (ref 5–25)
CALCIUM ALBUM COR SERPL-MCNC: 9.4 MG/DL (ref 8.3–10.1)
CALCIUM SERPL-MCNC: 8.8 MG/DL (ref 8.3–10.1)
CHLORIDE SERPL-SCNC: 107 MMOL/L (ref 100–108)
CHOLEST SERPL-MCNC: 102 MG/DL (ref 50–200)
CO2 SERPL-SCNC: 32 MMOL/L (ref 21–32)
CREAT SERPL-MCNC: 0.96 MG/DL (ref 0.6–1.3)
EST. AVERAGE GLUCOSE BLD GHB EST-MCNC: 214 MG/DL
GFR SERPL CREATININE-BSD FRML MDRD: 92 ML/MIN/1.73SQ M
GLUCOSE P FAST SERPL-MCNC: 165 MG/DL (ref 65–99)
HBA1C MFR BLD: 9.1 %
HCV AB SER QL: NORMAL
HDLC SERPL-MCNC: 51 MG/DL
LDLC SERPL CALC-MCNC: 44 MG/DL (ref 0–100)
POTASSIUM SERPL-SCNC: 4.5 MMOL/L (ref 3.5–5.3)
PROT SERPL-MCNC: 7 G/DL (ref 6.4–8.2)
SODIUM SERPL-SCNC: 144 MMOL/L (ref 136–145)
TRIGL SERPL-MCNC: 35 MG/DL

## 2021-06-26 PROCEDURE — 36415 COLL VENOUS BLD VENIPUNCTURE: CPT

## 2021-06-26 PROCEDURE — 83036 HEMOGLOBIN GLYCOSYLATED A1C: CPT

## 2021-06-26 PROCEDURE — 86803 HEPATITIS C AB TEST: CPT

## 2021-06-26 PROCEDURE — 3046F HEMOGLOBIN A1C LEVEL >9.0%: CPT | Performed by: INTERNAL MEDICINE

## 2021-06-26 PROCEDURE — 80053 COMPREHEN METABOLIC PANEL: CPT

## 2021-06-26 PROCEDURE — 80061 LIPID PANEL: CPT

## 2021-07-21 DIAGNOSIS — I10 ESSENTIAL HYPERTENSION: ICD-10-CM

## 2021-07-21 DIAGNOSIS — E78.2 MIXED HYPERLIPIDEMIA: ICD-10-CM

## 2021-07-21 DIAGNOSIS — E11.9 TYPE 2 DIABETES MELLITUS WITHOUT COMPLICATION, WITHOUT LONG-TERM CURRENT USE OF INSULIN (HCC): ICD-10-CM

## 2021-07-21 RX ORDER — ATORVASTATIN CALCIUM 20 MG/1
TABLET, FILM COATED ORAL
Qty: 90 TABLET | Refills: 3 | Status: SHIPPED | OUTPATIENT
Start: 2021-07-21 | End: 2022-03-17 | Stop reason: SDUPTHER

## 2021-07-21 RX ORDER — METOPROLOL SUCCINATE 100 MG/1
TABLET, EXTENDED RELEASE ORAL
Qty: 45 TABLET | Refills: 3 | Status: SHIPPED | OUTPATIENT
Start: 2021-07-21 | End: 2022-05-09

## 2021-07-23 ENCOUNTER — OFFICE VISIT (OUTPATIENT)
Dept: INTERNAL MEDICINE CLINIC | Facility: CLINIC | Age: 51
End: 2021-07-23
Payer: COMMERCIAL

## 2021-07-23 VITALS
BODY MASS INDEX: 40.51 KG/M2 | HEART RATE: 77 BPM | HEIGHT: 71 IN | SYSTOLIC BLOOD PRESSURE: 128 MMHG | TEMPERATURE: 98 F | WEIGHT: 289.38 LBS | OXYGEN SATURATION: 97 % | DIASTOLIC BLOOD PRESSURE: 78 MMHG

## 2021-07-23 DIAGNOSIS — E88.09 SERUM ALBUMIN DECREASED: ICD-10-CM

## 2021-07-23 DIAGNOSIS — E11.9 TYPE 2 DIABETES MELLITUS WITHOUT COMPLICATION, WITHOUT LONG-TERM CURRENT USE OF INSULIN (HCC): Primary | ICD-10-CM

## 2021-07-23 PROCEDURE — 99213 OFFICE O/P EST LOW 20 MIN: CPT | Performed by: INTERNAL MEDICINE

## 2021-07-23 PROCEDURE — 1036F TOBACCO NON-USER: CPT | Performed by: INTERNAL MEDICINE

## 2021-07-23 PROCEDURE — 3074F SYST BP LT 130 MM HG: CPT | Performed by: INTERNAL MEDICINE

## 2021-07-23 PROCEDURE — 3078F DIAST BP <80 MM HG: CPT | Performed by: INTERNAL MEDICINE

## 2021-07-23 PROCEDURE — 3008F BODY MASS INDEX DOCD: CPT | Performed by: INTERNAL MEDICINE

## 2021-07-23 RX ORDER — NAPROXEN SODIUM 220 MG
TABLET ORAL DAILY
Qty: 100 EACH | Refills: 5 | Status: SHIPPED | OUTPATIENT
Start: 2021-07-23 | End: 2022-03-17

## 2021-07-23 RX ORDER — INSULIN GLARGINE 100 [IU]/ML
10 INJECTION, SOLUTION SUBCUTANEOUS
Qty: 10 ML | Refills: 3 | Status: SHIPPED | OUTPATIENT
Start: 2021-07-23 | End: 2022-03-17

## 2021-07-23 NOTE — PROGRESS NOTES
Assessment/Plan:  Problem List Items Addressed This Visit        Endocrine    Type 2 diabetes mellitus without complication (HCC) - Primary    Relevant Medications    insulin glargine (LANTUS) 100 units/mL subcutaneous injection      Other Visit Diagnoses     Serum albumin decreased               Diagnoses and all orders for this visit:    Type 2 diabetes mellitus without complication, without long-term current use of insulin (HCC)  -     insulin glargine (LANTUS) 100 units/mL subcutaneous injection; Inject 10 Units under the skin daily at bedtime    Serum albumin decreased        No problem-specific Assessment & Plan notes found for this encounter  A/P: Doing ok, but sugars remain up and insurance issues causing a problem with meds  OAD options that are left, probably wouldn't get pt to goal  Will start basal insulin, low dose and continue current OAD  Pt to continue to check sugars and write them down  Discussed the need for a bedtime snack  RTC two weeks for med adjustment  Subjective:      Patient ID: Ivan Lawson is a 54 y o  male  Diabetic WM RTC for f/u uncontrolled DM after again attempting to get SGLT2 approved and labs  Labs ok except confirmed high HgA1c and low serum albumin  Pt called several times to see if he was able to get the med and he never called back  Reports it wasn't covered  Sugars continue to be elevated  No s/s  No new issues  The following portions of the patient's history were reviewed and updated as appropriate:   He has a past medical history of Diabetes mellitus ([address] ), Hyperlipidemia, Hypertension, and Kidney stones  ,  does not have any pertinent problems on file  ,   has a past surgical history that includes Neck surgery; Cholecystectomy; Epidural block injection (Left, 6/3/2021); and FL guided needle plac bx/asp/inj (6/3/2021)  ,  family history includes Breast cancer in his mother; Diabetes in his father, mother, and sister; Fibromyalgia in his sister; Hypertension in his mother  ,   reports that he has never smoked  He has never used smokeless tobacco  He reports that he does not drink alcohol and does not use drugs  ,  is allergic to codeine, lisinopril, metformin, and penicillins     Current Outpatient Medications   Medication Sig Dispense Refill    Ascorbic Acid (VITAMIN C) 1000 MG tablet Take 1,000 mg by mouth daily      atorvastatin (LIPITOR) 20 mg tablet TAKE 1 TABLET BY MOUTH EVERY DAY 90 tablet 3    Cholecalciferol (VITAMIN D3) 236706 UNIT/GM POWD by Does not apply route      glimepiride (AMARYL) 4 mg tablet Two po q day  180 tablet 0    metoprolol succinate (TOPROL-XL) 100 mg 24 hr tablet TAKE 1/2 TABLET BY MOUTH DAILY 45 tablet 3    Stacey Microlet Lancets lancets Use as instructed 100 each 1    Blood Glucose Monitoring Suppl ([employer] CONTOUR MONITOR) TRACEY by Does not apply route daily 1 Device 0    Empagliflozin 10 MG TABS Take 1 tablet (10 mg total) by mouth every morning (Patient not taking: Reported on 7/23/2021) 90 tablet 1    glucose blood (Contour Next Test) test strip Check blood glucose  each 1    insulin glargine (LANTUS) 100 units/mL subcutaneous injection Inject 10 Units under the skin daily at bedtime 10 mL 3     No current facility-administered medications for this visit  Review of Systems   Constitutional: Negative for activity change, chills, diaphoresis, fatigue and fever  Respiratory: Negative for cough, chest tightness, shortness of breath and wheezing  Cardiovascular: Negative for chest pain, palpitations and leg swelling  Gastrointestinal: Negative for abdominal pain, constipation, diarrhea, nausea and vomiting  Genitourinary: Negative for difficulty urinating, dysuria and frequency  Musculoskeletal: Negative for arthralgias, gait problem and myalgias  Neurological: Negative for dizziness, seizures, syncope, weakness, light-headedness and headaches     Psychiatric/Behavioral: Negative for confusion, dysphoric mood and sleep disturbance  The patient is not nervous/anxious  PHQ-9 Depression Screening    PHQ-9:   Frequency of the following problems over the past two weeks:            Objective:  Vitals:    07/23/21 0812   BP: 128/78   Pulse: 77   Temp: 98 °F (36 7 °C)   SpO2: 97%   Weight: 131 kg (289 lb 6 oz)   Height: 5' 11" (1 803 m)     Body mass index is 40 36 kg/m²  Physical Exam  Vitals and nursing note reviewed  Constitutional:       General: He is not in acute distress  Appearance: Normal appearance  He is not ill-appearing  HENT:      Head: Normocephalic and atraumatic  Mouth/Throat:      Mouth: Mucous membranes are moist    Eyes:      Extraocular Movements: Extraocular movements intact  Conjunctiva/sclera: Conjunctivae normal       Pupils: Pupils are equal, round, and reactive to light  Cardiovascular:      Rate and Rhythm: Normal rate and regular rhythm  Heart sounds: Normal heart sounds  Pulmonary:      Effort: Pulmonary effort is normal  No respiratory distress  Breath sounds: Normal breath sounds  No wheezing or rales  Neurological:      General: No focal deficit present  Mental Status: He is alert and oriented to person, place, and time  Mental status is at baseline  Psychiatric:         Mood and Affect: Mood normal          Behavior: Behavior normal          Thought Content:  Thought content normal          Judgment: Judgment normal

## 2021-07-23 NOTE — PATIENT INSTRUCTIONS
Type 2 Diabetes in the Older Adult   WHAT YOU NEED TO KNOW:   The risk for type 2 diabetes increases as a person gets older  Type 2 diabetes means your pancreas does not make enough insulin, or your body does not use insulin well  Insulin helps move sugar out of the blood so it can be used for energy  Diabetes cannot be cured, but it can be managed  DISCHARGE INSTRUCTIONS:   Call or have someone close to you call your local emergency number (911 in the [address] [facility]) for any of the following:   · You have any of the following signs of a stroke:      ? Numbness or drooping on one side of your face     ? Weakness in an arm or leg    ? Confusion or difficulty speaking    ? Dizziness, a severe headache, or vision loss    · You have any of the following signs of a heart attack:      ? Squeezing, pressure, or pain in your chest    ? You may  also have any of the following:     § Discomfort or pain in your back, neck, jaw, stomach, or arm    § Shortness of breath    § Nausea or vomiting    § Lightheadedness or a sudden cold sweat    Return to the emergency department if:   · Your blood sugar level is higher than your goal and does not come down with treatment  · You have signs of a high blood sugar level, such as blurred or double vision  · You have signs of a high ketone level, such as fruity, sweet smelling breath, or shallow breathing  · You have symptoms of a low blood sugar level, such as trouble thinking, sweating, or a pounding heartbeat  · Your blood sugar level is lower than normal and does not improve with treatment  Call your doctor or diabetes care team if:   · You are vomiting or have diarrhea  · You have an upset stomach and cannot eat the foods on your meal plan  · You feel weak or more tired than usual     · You feel dizzy, have headaches, or are easily irritated  · Your skin is red, warm, dry, or swollen      · You have a wound that does not heal     · You have numbness in your arms or legs     · You have trouble coping with diabetes, or you feel anxious or depressed  · You have problems with your memory  · You have changes in your vision  · You have questions or concerns about your condition or care  Diabetes education:  Diabetes education will start right away, if the diagnosis is new  You may need diabetes education at a later time to refresh your memory  Members of your care team can help you, your family, and caregivers with a plan for the following:  · How to check your blood sugar level: You will learn when to check your blood sugar level and what the level should be  You will learn what to do if your level is too high or too low  Write down the times of your checks and your levels  Take them to all follow-up appointments  · About diabetes medicine:  Oral diabetes medicine may be given to help control your blood sugar levels  Your healthcare provider will teach you how and when to take your diabetes medicine  You will also be taught when not to take the medicine  You will also be taught about side effects oral diabetes medicine can cause  · If you need insulin:  Insulin may be added if oral diabetes medicine becomes less effective over time  You and your family members will be taught how to draw up and give insulin, if needed  You will learn how much insulin you need and what time to inject insulin  You will be taught when not to give insulin  You will also be taught what to do if your blood sugar level drops too low  This may happen if you take insulin and do not eat the right amount of carbohydrates  Your team will also teach you how to dispose of needles and syringes  · About nutrition:  A dietitian will help you make a meal plan to keep your blood sugar level steady  You will learn why protein in your diet is important  You will learn how food affects your blood sugar levels  You will also learn to keep track of sugar and starchy foods (carbohydrates)   Do not skip meals  Your blood sugar level may drop too low if you have taken insulin and do not eat  · How to prevent complications:  Diabetes that is not well controlled can lead to health problems  Examples include foot sores, retinopathy (vision loss), and peripheral neuropathy (loss of feeling in your hands and feet)  Also, risk for dementia can increase when blood sugar levels that are too high or too low for long periods of time  Your team will help you know when to get regular checkups, such as vision checks  They will teach you how to watch for problems and when to get a problem checked  Manage diabetes and prevent problems:  Sometimes type 2 diabetes can be managed with changes in nutrition and physical activity  · Work with your diabetes care team to create plans to meet your needs  Your diabetes care team may include a physician, nurse practitioner, and physician assistant  It may also include a diabetes nurse educator, dietitian, and an exercise specialist  Family members, or others who are close to you, may also be part of the team  You and your team will make goals and plans to manage diabetes and other health problems  For example, the plan will include how to manage medicines you may take for diabetes and for other health conditions  The plans and goals will be specific to your needs and abilities  Your plan will change as your needs and abilities change  · Manage other health issues as directed  Health issues may include high blood pressure, high cholesterol levels, and heart problems  Health issues may also include depression  Together you and your care team can create a plan to manage any other health issues  · Try to be physically active for 30 to 60 minutes most days of the week  Physical activity, such as exercise, helps keep your blood sugar level steady and lowers your risk for heart disease  Physical activity can help improve your balance and strength and lower your risk for falls  Start slowly  Activity can be done in 10-minute intervals  ? Set a goal for 30 minutes of aerobic activity at least 5 times a week  Aerobic activity helps your heart stay strong  Aerobic activity includes walking, bicycling, dancing, swimming, and raking leaves  ? Set a goal for strength training 2 times a week  Strength training helps you keep the muscles you have and build new muscles  Strength training includes lifting weights, climbing stairs, and doing yoga or stewart chi          ? Stay steady on your on your feet with balancing activities  These include walking backwards, standing on one foot, and walking heel to toe in a straight line  · Maintain a healthy weight  Ask your provider what a healthy weight is for you  A healthy weight can help you control diabetes and prevent heart disease  Ask your provider to help you create a weight loss plan if you are overweight  Weight loss of 10 to 15 pounds can help make a difference in managing diabetes  Together you and your care team can set manageable weight loss goals  · Know the risks if you choose to drink alcohol  Alcohol can cause your blood sugar levels to be low if you use insulin  Alcohol can cause high blood sugar levels and weight gain if you drink too much  Women 21 years or older and men 72 years or older should limit alcohol to 1 drink a day  Men 21 to 64 years should limit alcohol to 2 drinks a day  A drink of alcohol is 12 ounces of beer, 5 ounces of wine, or 1½ ounces of liquor  · Do not smoke  Nicotine and other chemicals in cigarettes can cause lung disease and other health problems  It can also cause blood vessel damage that makes diabetes more difficult to manage  Ask your healthcare provider for information if you currently smoke and need help to quit  Do not use e-cigarettes or smokeless tobacco in place of cigarettes or to help you quit  They still contain nicotine      Other ways to manage diabetes:   · Check your feet every day for sores  Look at your whole foot, including the bottom, and between and under your toes  Check for wounds, corns, and calluses  Use a mirror to see the bottom of your feet  The skin on your feet may be shiny, tight, dry, or darker than normal  Your feet may also be cold and pale  Feel your feet by running your hands along the tops, bottoms, sides, and between your toes  Redness, swelling, and warmth are signs of blood flow problems that can lead to a foot ulcer  Do not try to remove corns or calluses yourself  · Wear medical alert identification  Wear medical alert jewelry or carry a card that says you have type 2 diabetes  Ask your healthcare provider where to get these items  · Ask about vaccines  You have a higher risk for serious illness if you get the flu, pneumonia, or hepatitis  Ask your healthcare provider if you should get a flu, pneumonia, shingles, or hepatitis B vaccine, and when to get the vaccine  · Get help from family and friends  You may need help checking your blood sugar level, giving insulin injections, or preparing your meals  You may also need help to check your feet for sores  Ask your family and friends to help you with these tasks  Talk to your care team if you need someone at home to help you  Follow up with your doctor or diabetes care team as directed: You will need to return to meet with different care team members  You may need tests to monitor for problems  Write down your questions so you remember to ask them during your visits  Talk to your care team or doctor if you cannot afford your medicines  © Copyright [address] 2021 Information is for End User's use only and may not be sold, redistributed or otherwise used for commercial purposes  All illustrations and images included in CareNotes® are the copyrighted property of A D A M [employer] Inc  or Arturo Rossi  The above information is an  only   It is not intended as medical advice for individual conditions or treatments  Talk to your doctor, nurse or pharmacist before following any medical regimen to see if it is safe and effective for you

## 2021-08-03 ENCOUNTER — HOSPITAL ENCOUNTER (EMERGENCY)
Facility: HOSPITAL | Age: 51
Discharge: HOME | End: 2021-08-03
Attending: EMERGENCY MEDICINE
Payer: COMMERCIAL

## 2021-08-03 VITALS
WEIGHT: 286.6 LBS | OXYGEN SATURATION: 99 % | SYSTOLIC BLOOD PRESSURE: 168 MMHG | TEMPERATURE: 98 F | DIASTOLIC BLOOD PRESSURE: 82 MMHG | RESPIRATION RATE: 18 BRPM | BODY MASS INDEX: 40.12 KG/M2 | HEIGHT: 71 IN | HEART RATE: 80 BPM

## 2021-08-03 DIAGNOSIS — R21 RASH: Primary | ICD-10-CM

## 2021-08-03 PROCEDURE — 99282 EMERGENCY DEPT VISIT SF MDM: CPT

## 2021-08-03 PROCEDURE — 99284 EMERGENCY DEPT VISIT MOD MDM: CPT | Performed by: PHYSICIAN ASSISTANT

## 2021-08-03 RX ORDER — PREDNISONE 50 MG/1
50 TABLET ORAL DAILY
Qty: 4 TABLET | Refills: 0 | Status: SHIPPED | OUTPATIENT
Start: 2021-08-03 | End: 2022-03-17

## 2021-08-03 RX ORDER — CLINDAMYCIN HYDROCHLORIDE 300 MG/1
300 CAPSULE ORAL 4 TIMES DAILY
Qty: 28 CAPSULE | Refills: 0 | Status: SHIPPED | OUTPATIENT
Start: 2021-08-03 | End: 2021-08-10

## 2021-08-03 NOTE — ED PROVIDER NOTES
History  Chief Complaint   Patient presents with    Rash     bilateral rash on legs/buttocks that started 4 days ago after patient was in [facility]      Patient presents to the emergency department today offering a chief complaint a rash on the superior aspect of the bilateral posterior upper legs  He states that the symptoms have been present over the last 4-5 days [place]  States it is itchy however not very painful  He states he has scratched some of the lesions open  He states it feels as has poison ivy  He denies any systemic symptoms such as fevers chills sweats arthralgias or myalgias  Prior to Admission Medications   Prescriptions Last Dose Informant Patient Reported? Taking? Ascorbic Acid (VITAMIN C) 1000 MG tablet  Self Yes No   Sig: Take 1,000 mg by mouth daily   Navigenics Microlet Lancets lancets  Self No No   Sig: Use as instructed   Blood Glucose Monitoring Suppl (Speedment CONTOUR MONITOR) TRACEY  Self No No   Sig: by Does not apply route daily   Cholecalciferol (VITAMIN D3) 956390 UNIT/GM POWD  Self Yes No   Sig: by Does not apply route   Empagliflozin 10 MG TABS   No No   Sig: Take 1 tablet (10 mg total) by mouth every morning   Patient not taking: Reported on 7/23/2021   Insulin Syringe-Needle U-100 (INSULIN SYRINGE  3CC/30GX5/16") 30G X 5/16" 0 3 ML MISC   No No   Sig: Use daily Use 1 time daily as directed   atorvastatin (LIPITOR) 20 mg tablet   No No   Sig: TAKE 1 TABLET BY MOUTH EVERY DAY   glimepiride (AMARYL) 4 mg tablet   No No   Sig: Two po q day     glucose blood (Contour Next Test) test strip  Self No No   Sig: Check blood glucose BID   insulin glargine (LANTUS) 100 units/mL subcutaneous injection   No No   Sig: Inject 10 Units under the skin daily at bedtime   metoprolol succinate (TOPROL-XL) 100 mg 24 hr tablet   No No   Sig: TAKE 1/2 TABLET BY MOUTH DAILY      Facility-Administered Medications: None       Past Medical History:   Diagnosis Date    Diabetes mellitus ([address] )     Hyperlipidemia     Hypertension     Kidney stones        Past Surgical History:   Procedure Laterality Date    CHOLECYSTECTOMY      EPIDURAL BLOCK INJECTION Left 6/3/2021    Procedure: Left L3-4 transforaminal epidural steroid injection;  Surgeon: Carie Mathew MD;  Location: [facility];  Service: Pain Management     FL GUIDED NEEDLE PLAC BX/ASP/INJ  6/3/2021    NECK SURGERY         Family History   Problem Relation Age of Onset    Hypertension Mother     Diabetes Mother    Adrián Malhotra Breast cancer Mother     Diabetes Father     Diabetes Sister     Fibromyalgia Sister      I have reviewed and agree with the history as documented  E-Cigarette/Vaping    E-Cigarette Use Never User      E-Cigarette/Vaping Substances     Social History     Tobacco Use    Smoking status: Never Smoker    Smokeless tobacco: Never Used   Vaping Use    Vaping Use: Never used   Substance Use Topics    Alcohol use: Never    Drug use: Never       Review of Systems   Constitutional: Negative for chills and fever  HENT: Negative for ear pain and sore throat  Eyes: Negative for pain and visual disturbance  Respiratory: Negative for cough and shortness of breath  Cardiovascular: Negative for chest pain and palpitations  Gastrointestinal: Negative for abdominal pain and vomiting  Genitourinary: Negative for dysuria and hematuria  Musculoskeletal: Negative for arthralgias and back pain  Skin: Positive for rash  Negative for color change  Neurological: Negative for seizures and syncope  All other systems reviewed and are negative  Physical Exam  Physical Exam  Vitals reviewed  Constitutional:       General: He is not in acute distress  Appearance: He is not ill-appearing, toxic-appearing or diaphoretic  Eyes:      General: No scleral icterus  Right eye: No discharge  Left eye: No discharge  Extraocular Movements: Extraocular movements intact  Conjunctiva/sclera: Conjunctivae normal    Cardiovascular:      Rate and Rhythm: Normal rate  Pulses: Normal pulses  Heart sounds: Normal heart sounds  Pulmonary:      Effort: Pulmonary effort is normal       Breath sounds: Normal breath sounds  Musculoskeletal:         General: No swelling or tenderness  Normal range of motion  Skin:     Capillary Refill: Capillary refill takes less than 2 seconds  Findings: Rash present  Comments: Patient has papular type somewhat linear rash of the bilateral posterior superior upper legs  There is no evidence of petechiae or purpura  Neurological:      General: No focal deficit present  Mental Status: He is alert and oriented to person, place, and time  Mental status is at baseline  Psychiatric:         Mood and Affect: Mood normal          Behavior: Behavior normal          Vital Signs  ED Triage Vitals [08/03/21 1748]   Temperature Pulse Respirations Blood Pressure SpO2   98 °F (36 7 °C) 80 18 168/82 99 %      Temp Source Heart Rate Source Patient Position - Orthostatic VS BP Location FiO2 (%)   Temporal Monitor -- Right arm --      Pain Score       No Pain           Vitals:    08/03/21 1748   BP: 168/82   Pulse: 80         Visual Acuity      ED Medications  Medications - No data to display    Diagnostic Studies  Results Reviewed     None                 No orders to display              Procedures  Procedures         ED Course                                           MDM    Disposition  Final diagnoses:   Rash     Time reflects when diagnosis was documented in both MDM as applicable and the Disposition within this note     Time User Action Codes Description Comment    8/3/2021  6:05 PM Orval Ready Add [R21] Rash       ED Disposition     ED Disposition Condition Date/Time Comment    Discharge Stable Tue Aug 3, 2021  6:05 PM Orlin Salas discharge to home/self care              Follow-up Information     Follow up With Specialties Details Why Contact Donovan Love,  Internal Medicine Schedule an appointment as soon as possible for a visit  As needed [address] [facility]  [address]   [facility]  [phone]            Patient's Medications   Discharge Prescriptions    CLINDAMYCIN (CLEOCIN) 300 MG CAPSULE    Take 1 capsule (300 mg total) by mouth 4 (four) times a day for 7 days       Start Date: 8/3/2021  End Date: 8/10/2021       Order Dose: 300 mg       Quantity: 28 capsule    Refills: 0    PREDNISONE 50 MG TABLET    Take 1 tablet (50 mg total) by mouth daily       Start Date: 8/3/2021  End Date: --       Order Dose: 50 mg       Quantity: 4 tablet    Refills: 0     No discharge procedures on file      PDMP Review       Value Time User    PDMP Reviewed  Yes 3/13/2020 10:08 AM Cait Crouch PA-C          ED Provider  Electronically Signed by           Diamond Love PA-C  08/03/21 7580

## 2021-08-11 DIAGNOSIS — E11.9 TYPE 2 DIABETES MELLITUS WITHOUT COMPLICATION, WITHOUT LONG-TERM CURRENT USE OF INSULIN (HCC): Primary | ICD-10-CM

## 2021-08-11 RX ORDER — DAPAGLIFLOZIN 10 MG/1
10 TABLET, FILM COATED ORAL DAILY
Qty: 90 TABLET | Refills: 0 | Status: SHIPPED | COMMUNITY
Start: 2021-08-11 | End: 2021-09-08 | Stop reason: SDUPTHER

## 2021-08-19 DIAGNOSIS — Z78.9 DRUG INTOLERANCE: ICD-10-CM

## 2021-08-19 DIAGNOSIS — E11.9 TYPE 2 DIABETES MELLITUS WITHOUT COMPLICATION, WITHOUT LONG-TERM CURRENT USE OF INSULIN (HCC): ICD-10-CM

## 2021-08-19 DIAGNOSIS — I10 ESSENTIAL HYPERTENSION: ICD-10-CM

## 2021-08-19 RX ORDER — GLIMEPIRIDE 4 MG/1
TABLET ORAL
Qty: 180 TABLET | Refills: 0 | Status: SHIPPED | OUTPATIENT
Start: 2021-08-19 | End: 2021-09-08

## 2021-08-23 DIAGNOSIS — E11.9 TYPE 2 DIABETES MELLITUS WITHOUT COMPLICATION, WITHOUT LONG-TERM CURRENT USE OF INSULIN (HCC): ICD-10-CM

## 2021-08-23 RX ORDER — BLOOD SUGAR DIAGNOSTIC
STRIP MISCELLANEOUS
Qty: 100 STRIP | Refills: 1 | Status: SHIPPED | OUTPATIENT
Start: 2021-08-23

## 2021-09-03 ENCOUNTER — OFFICE VISIT (OUTPATIENT)
Dept: PAIN MEDICINE | Facility: CLINIC | Age: 51
End: 2021-09-03
Payer: COMMERCIAL

## 2021-09-03 VITALS
WEIGHT: 282 LBS | DIASTOLIC BLOOD PRESSURE: 83 MMHG | HEART RATE: 64 BPM | SYSTOLIC BLOOD PRESSURE: 148 MMHG | HEIGHT: 71 IN | BODY MASS INDEX: 39.48 KG/M2

## 2021-09-03 DIAGNOSIS — M54.16 LUMBAR RADICULOPATHY: ICD-10-CM

## 2021-09-03 DIAGNOSIS — G89.4 CHRONIC PAIN SYNDROME: Primary | ICD-10-CM

## 2021-09-03 PROCEDURE — 99214 OFFICE O/P EST MOD 30 MIN: CPT | Performed by: NURSE PRACTITIONER

## 2021-09-03 RX ORDER — GABAPENTIN 100 MG/1
CAPSULE ORAL
Qty: 90 CAPSULE | Refills: 1 | Status: SHIPPED | OUTPATIENT
Start: 2021-09-03 | End: 2021-10-20

## 2021-09-03 NOTE — PROGRESS NOTES
Assessment:  1  Chronic pain syndrome    2  Lumbar radiculopathy        Plan:   While the patient was in the office today, I did have a thorough conversation regarding their chronic pain syndrome, medication management, and treatment plan options  Patient is being seen for follow-up visit  He was brought in the office today for complaints of increased pain  He states that his pain increased 3 days ago after getting out of a car  Pain is located in the low back and radiates down the anterior aspect of his left leg to his shin area  He had a previous  Left-sided L3-4 transforaminal epidural steroid injection performed in June which provided him with 60-70% improvement until just now  At this point, we will schedule him for repeat left-sided L3-4 transforaminal epidural steroid injection in the near future  I discussed with the patient that I feel a medication such as Neurontin would be helpful in treating her pain  I discussed with patient the type of medication it is, however works, and that it requires a titration process that is specific to each individual   I reviewed with the patient that it may take 3-4 weeks for the medications side effects to be noticed and it should never be abruptly stop  Possible side effects include, but are not limited to:  Vertigo, lethargy, nausea, and edema of the extremities  Advised the patient to call our office if he experience any side effects  The patient verbalized an understanding  Start gabapentin titrating 100 mg 3 times daily  A prescription was sent to his pharmacy  Follow-up 1 month after the injection  History of Present Illness: The patient is a 54 y o  male who presents for a follow up office visit in regards to Buttocks Pain, Back Pain, and Leg Pain  The patients current symptoms include   Complaints of low back and left leg pain  Current pain level is an 8/10  Quality pain is described as burning, sharp, pressure-like      Current pain medications includes:   Over-the-counter Tylenol or Advil if needed   The patient reports that this regimen is providing  0% pain relief  The patient is reporting no side effects from this pain medication regimen  I have personally reviewed and/or updated the patient's past medical history, past surgical history, family history, social history, current medications, allergies, and vital signs today  Review of Systems  Review of Systems   Constitutional: Negative for fatigue  HENT: Negative for ear pain and hearing loss  Eyes: Negative for redness  Respiratory: Negative for cough  Cardiovascular: Negative for leg swelling  Gastrointestinal: Negative for anal bleeding and rectal pain  Endocrine: Negative for polydipsia  Genitourinary: Negative for hematuria  Musculoskeletal: Positive for back pain, gait problem, joint swelling (back) and myalgias (left hip/leg)  Skin: Negative for rash  Neurological: Positive for weakness  Negative for headaches  Hematological: Does not bruise/bleed easily  Psychiatric/Behavioral: Negative for behavioral problems and hallucinations           Past Medical History:   Diagnosis Date    Diabetes mellitus ([address] )     Hyperlipidemia     Hypertension     Kidney stones        Past Surgical History:   Procedure Laterality Date    CHOLECYSTECTOMY      EPIDURAL BLOCK INJECTION Left 6/3/2021    Procedure: Left L3-4 transforaminal epidural steroid injection;  Surgeon: Carie Mathew MD;  [facility];  Service: Pain Management     FL GUIDED NEEDLE PLAC BX/ASP/INJ  6/3/2021    NECK SURGERY         Family History   Problem Relation Age of Onset    Hypertension Mother     Diabetes Mother    Adrián Malhotra Breast cancer Mother    Adrián Malhotra Diabetes Father     Diabetes Sister     Fibromyalgia Sister        Social History     Occupational History    Occupation: EMPLOYED   Tobacco Use    Smoking status: Never Smoker    Smokeless tobacco: Never Used   Vaping Use  Vaping Use: Never used   Substance and Sexual Activity    Alcohol use: Never    Drug use: Never    Sexual activity: Not Currently         Current Outpatient Medications:     Ascorbic Acid (VITAMIN C) 1000 MG tablet, Take 1,000 mg by mouth daily, Disp: , Rfl:     atorvastatin (LIPITOR) 20 mg tablet, TAKE 1 TABLET BY MOUTH EVERY DAY, Disp: 90 tablet, Rfl: 3    Cholecalciferol (VITAMIN D3) 120027 UNIT/GM POWD, by Does not apply route, Disp: , Rfl:     Dapagliflozin Propanediol (Farxiga) 10 MG TABS, Take 1 tablet (10 mg total) by mouth daily, Disp: 90 tablet, Rfl: 0    glimepiride (AMARYL) 4 mg tablet, TAKE 2 TABLETS DAILY, Disp: 180 tablet, Rfl: 0    metoprolol succinate (TOPROL-XL) 100 mg 24 hr tablet, TAKE 1/2 TABLET BY MOUTH DAILY, Disp: 45 tablet, Rfl: 3    Stacey Microlet Lancets lancets, Use as instructed, Disp: 100 each, Rfl: 1    Blood Glucose Monitoring Suppl (Kony CONTOUR MONITOR) TRACEY, by Does not apply route daily, Disp: 1 Device, Rfl: 0    Empagliflozin 10 MG TABS, Take 1 tablet (10 mg total) by mouth every morning (Patient not taking: Reported on 7/23/2021), Disp: 90 tablet, Rfl: 1    gabapentin (NEURONTIN) 100 mg capsule, 1 PO QHS x 1 day, then 1 PO BID x 1 day, then 1 PO TID, Disp: 90 capsule, Rfl: 1    glucose blood (Contour Next Test) test strip, CHECK BLOOD GLUCOSE TWICE A DAY, Disp: 100 strip, Rfl: 1    insulin glargine (LANTUS) 100 units/mL subcutaneous injection, Inject 10 Units under the skin daily at bedtime (Patient not taking: Reported on 9/3/2021), Disp: 10 mL, Rfl: 3    Insulin Syringe-Needle U-100 (INSULIN SYRINGE  3CC/30GX5/16") 30G X 5/16" 0 3 ML MISC, Use daily Use 1 time daily as directed, Disp: 100 each, Rfl: 5    predniSONE 50 mg tablet, Take 1 tablet (50 mg total) by mouth daily (Patient not taking: Reported on 9/3/2021), Disp: 4 tablet, Rfl: 0    Allergies   Allergen Reactions    Codeine Nausea Only    Lisinopril      swelling    Metformin      GI    Penicillins Hives       Physical Exam:    /83 (BP Location: Left arm, Patient Position: Sitting, Cuff Size: Large)   Pulse 64   Ht 5' 11" (1 803 m)   Wt 128 kg (282 lb)   BMI 39 33 kg/m²     Constitutional:normal, well developed, well nourished, alert, in no distress and non-toxic and no overt pain behavior  and overweight  Eyes:anicteric  HEENT:grossly intact  Neck:supple, symmetric, trachea midline and no masses   Pulmonary:even and unlabored  Cardiovascular:No edema or pitting edema present  Skin:Normal without rashes or lesions and well hydrated  Psychiatric:Mood and affect appropriate  Neurologic:Cranial Nerves II-XII grossly intact  Musculoskeletal: gait is slow and guarded  Imaging  No orders to display       No orders of the defined types were placed in this encounter

## 2021-09-03 NOTE — H&P (VIEW-ONLY)
Assessment:  1  Chronic pain syndrome    2  Lumbar radiculopathy        Plan:   While the patient was in the office today, I did have a thorough conversation regarding their chronic pain syndrome, medication management, and treatment plan options  Patient is being seen for follow-up visit  He was brought in the office today for complaints of increased pain  He states that his pain increased 3 days ago after getting out of a car  Pain is located in the low back and radiates down the anterior aspect of his left leg to his shin area  He had a previous  Left-sided L3-4 transforaminal epidural steroid injection performed in June which provided him with 60-70% improvement until just now  At this point, we will schedule him for repeat left-sided L3-4 transforaminal epidural steroid injection in the near future  I discussed with the patient that I feel a medication such as Neurontin would be helpful in treating her pain  I discussed with patient the type of medication it is, however works, and that it requires a titration process that is specific to each individual   I reviewed with the patient that it may take 3-4 weeks for the medications side effects to be noticed and it should never be abruptly stop  Possible side effects include, but are not limited to:  Vertigo, lethargy, nausea, and edema of the extremities  Advised the patient to call our office if he experience any side effects  The patient verbalized an understanding  Start gabapentin titrating 100 mg 3 times daily  A prescription was sent to his pharmacy  Follow-up 1 month after the injection  History of Present Illness: The patient is a 54 y o  male who presents for a follow up office visit in regards to Buttocks Pain, Back Pain, and Leg Pain  The patients current symptoms include   Complaints of low back and left leg pain  Current pain level is an 8/10  Quality pain is described as burning, sharp, pressure-like      Current pain medications includes:   Over-the-counter Tylenol or Advil if needed   The patient reports that this regimen is providing  0% pain relief  The patient is reporting no side effects from this pain medication regimen  I have personally reviewed and/or updated the patient's past medical history, past surgical history, family history, social history, current medications, allergies, and vital signs today  Review of Systems  Review of Systems   Constitutional: Negative for fatigue  HENT: Negative for ear pain and hearing loss  Eyes: Negative for redness  Respiratory: Negative for cough  Cardiovascular: Negative for leg swelling  Gastrointestinal: Negative for anal bleeding and rectal pain  Endocrine: Negative for polydipsia  Genitourinary: Negative for hematuria  Musculoskeletal: Positive for back pain, gait problem, joint swelling (back) and myalgias (left hip/leg)  Skin: Negative for rash  Neurological: Positive for weakness  Negative for headaches  Hematological: Does not bruise/bleed easily  Psychiatric/Behavioral: Negative for behavioral problems and hallucinations           Past Medical History:   Diagnosis Date    Diabetes mellitus ([address] )     Hyperlipidemia     Hypertension     Kidney stones        Past Surgical History:   Procedure Laterality Date    CHOLECYSTECTOMY      EPIDURAL BLOCK INJECTION Left 6/3/2021    Procedure: Left L3-4 transforaminal epidural steroid injection;  Surgeon: Ramana Robertson MD;  Location: [facility];  Service: Pain Management     FL GUIDED NEEDLE PLAC BX/ASP/INJ  6/3/2021    NECK SURGERY         Family History   Problem Relation Age of Onset    Hypertension Mother     Diabetes Mother    Tim Cruz Breast cancer Mother    Tim Cruz Diabetes Father     Diabetes Sister     Fibromyalgia Sister        Social History     Occupational History    Occupation: EMPLOYED   Tobacco Use    Smoking status: Never Smoker    Smokeless tobacco: Never Used   Vaping Use  Vaping Use: Never used   Substance and Sexual Activity    Alcohol use: Never    Drug use: Never    Sexual activity: Not Currently         Current Outpatient Medications:     Ascorbic Acid (VITAMIN C) 1000 MG tablet, Take 1,000 mg by mouth daily, Disp: , Rfl:     atorvastatin (LIPITOR) 20 mg tablet, TAKE 1 TABLET BY MOUTH EVERY DAY, Disp: 90 tablet, Rfl: 3    Cholecalciferol (VITAMIN D3) 618682 UNIT/GM POWD, by Does not apply route, Disp: , Rfl:     Dapagliflozin Propanediol (Farxiga) 10 MG TABS, Take 1 tablet (10 mg total) by mouth daily, Disp: 90 tablet, Rfl: 0    glimepiride (AMARYL) 4 mg tablet, TAKE 2 TABLETS DAILY, Disp: 180 tablet, Rfl: 0    metoprolol succinate (TOPROL-XL) 100 mg 24 hr tablet, TAKE 1/2 TABLET BY MOUTH DAILY, Disp: 45 tablet, Rfl: 3    Stacey Microlet Lancets lancets, Use as instructed, Disp: 100 each, Rfl: 1    Blood Glucose Monitoring Suppl ([employer] CONTOUR MONITOR) TRACEY, by Does not apply route daily, Disp: 1 Device, Rfl: 0    Empagliflozin 10 MG TABS, Take 1 tablet (10 mg total) by mouth every morning (Patient not taking: Reported on 7/23/2021), Disp: 90 tablet, Rfl: 1    gabapentin (NEURONTIN) 100 mg capsule, 1 PO QHS x 1 day, then 1 PO BID x 1 day, then 1 PO TID, Disp: 90 capsule, Rfl: 1    glucose blood (Contour Next Test) test strip, CHECK BLOOD GLUCOSE TWICE A DAY, Disp: 100 strip, Rfl: 1    insulin glargine (LANTUS) 100 units/mL subcutaneous injection, Inject 10 Units under the skin daily at bedtime (Patient not taking: Reported on 9/3/2021), Disp: 10 mL, Rfl: 3    Insulin Syringe-Needle U-100 (INSULIN SYRINGE  3CC/30GX5/16") 30G X 5/16" 0 3 ML MISC, Use daily Use 1 time daily as directed, Disp: 100 each, Rfl: 5    predniSONE 50 mg tablet, Take 1 tablet (50 mg total) by mouth daily (Patient not taking: Reported on 9/3/2021), Disp: 4 tablet, Rfl: 0    Allergies   Allergen Reactions    Codeine Nausea Only    Lisinopril      swelling    Metformin      GI    Penicillins Hives       Physical Exam:    /83 (BP Location: Left arm, Patient Position: Sitting, Cuff Size: Large)   Pulse 64   Ht 5' 11" (1 803 m)   Wt 128 kg (282 lb)   BMI 39 33 kg/m²     Constitutional:normal, well developed, well nourished, alert, in no distress and non-toxic and no overt pain behavior  and overweight  Eyes:anicteric  HEENT:grossly intact  Neck:supple, symmetric, trachea midline and no masses   Pulmonary:even and unlabored  Cardiovascular:No edema or pitting edema present  Skin:Normal without rashes or lesions and well hydrated  Psychiatric:Mood and affect appropriate  Neurologic:Cranial Nerves II-XII grossly intact  Musculoskeletal: gait is slow and guarded  Imaging  No orders to display       No orders of the defined types were placed in this encounter

## 2021-09-08 DIAGNOSIS — I10 ESSENTIAL HYPERTENSION: ICD-10-CM

## 2021-09-08 DIAGNOSIS — Z78.9 DRUG INTOLERANCE: ICD-10-CM

## 2021-09-08 DIAGNOSIS — E11.9 TYPE 2 DIABETES MELLITUS WITHOUT COMPLICATION, WITHOUT LONG-TERM CURRENT USE OF INSULIN (HCC): ICD-10-CM

## 2021-09-08 RX ORDER — GLIMEPIRIDE 4 MG/1
TABLET ORAL
Qty: 180 TABLET | Refills: 1 | Status: SHIPPED | OUTPATIENT
Start: 2021-09-08 | End: 2022-05-09

## 2021-09-08 RX ORDER — DAPAGLIFLOZIN 10 MG/1
10 TABLET, FILM COATED ORAL DAILY
Qty: 90 TABLET | Refills: 1 | Status: SHIPPED | OUTPATIENT
Start: 2021-09-08 | End: 2022-03-17 | Stop reason: SDUPTHER

## 2021-09-08 NOTE — TELEPHONE ENCOUNTER
Pt needs refill on Dapagliflozin Propanediol (Farxiga) 10 MG TABS please send to [facility] in [place]   Pt says they are completely out and need them ASAP

## 2021-09-16 ENCOUNTER — APPOINTMENT (OUTPATIENT)
Dept: RADIOLOGY | Facility: HOSPITAL | Age: 51
End: 2021-09-16
Payer: COMMERCIAL

## 2021-09-16 ENCOUNTER — HOSPITAL ENCOUNTER (OUTPATIENT)
Facility: HOSPITAL | Age: 51
Setting detail: OUTPATIENT SURGERY
Discharge: HOME | End: 2021-09-16
Attending: ANESTHESIOLOGY | Admitting: ANESTHESIOLOGY
Payer: COMMERCIAL

## 2021-09-16 VITALS
OXYGEN SATURATION: 97 % | SYSTOLIC BLOOD PRESSURE: 142 MMHG | TEMPERATURE: 97.7 F | RESPIRATION RATE: 18 BRPM | DIASTOLIC BLOOD PRESSURE: 71 MMHG | HEART RATE: 55 BPM

## 2021-09-16 DIAGNOSIS — M54.16 LUMBAR RADICULOPATHY: ICD-10-CM

## 2021-09-16 PROCEDURE — 64483 NJX AA&/STRD TFRM EPI L/S 1: CPT | Performed by: ANESTHESIOLOGY

## 2021-09-16 RX ORDER — DEXAMETHASONE SODIUM PHOSPHATE 10 MG/ML
INJECTION, SOLUTION INTRAMUSCULAR; INTRAVENOUS AS NEEDED
Status: DISCONTINUED | OUTPATIENT
Start: 2021-09-16 | End: 2021-09-16 | Stop reason: HOSPADM

## 2021-09-16 RX ORDER — LIDOCAINE HYDROCHLORIDE 10 MG/ML
INJECTION, SOLUTION EPIDURAL; INFILTRATION; INTRACAUDAL; PERINEURAL AS NEEDED
Status: DISCONTINUED | OUTPATIENT
Start: 2021-09-16 | End: 2021-09-16 | Stop reason: HOSPADM

## 2021-09-16 NOTE — INTERVAL H&P NOTE
H&P reviewed  After examining the patient I find no changes in the patients condition since the H&P had been written      Vitals:    09/16/21 0723   BP: 148/81   Pulse: (!) 54   Resp: 18   Temp: 97 7 °F (36 5 °C)   SpO2: 95%

## 2021-09-16 NOTE — OP NOTE
OPERATIVE REPORT  PATIENT NAME: Gladis Baker    :  1970  MRN: [id number]  Pt Location: [facility] ROOM 01    SURGERY DATE: 2021    Surgeon(s) and Role:     * Pamela Shea MD - Primary    Preop Diagnosis:  Chronic pain syndrome [G89 4]  Lumbar radiculopathy [M54 16]    Post-Op Diagnosis Codes:     * Chronic pain syndrome [G89 4]     * Lumbar radiculopathy [M54 16]    Procedure(s) (LRB):  L3-4 TRANSFORAMINAL EPIDURAL STERIOD INJECTION (Left)    Specimen(s):  * No specimens in log *    Estimated Blood Loss:   Minimal    Drains:  * No LDAs found *    Anesthesia Type:   Local    Operative Indications:  Chronic pain syndrome [G89 4]  Lumbar radiculopathy [M54 16]      Operative Findings:  same    Complications:   None    Procedure and Technique:  Fluoroscopically-guided left L3-4 transforaminal epidural steroid injection under fluoroscopy      After discussing the risks, benefits, and alternatives to the procedure, the patient expressed understanding and wished to proceed  The patient was brought to the fluoroscopy suite and placed in the prone position  A procedural pause was conducted to verify:  correct patient identity, procedure to be performed and as applicable, correct side and site, correct patient position, and availability of implants, special equipment and special requirements  After identifying the left L3 pedicle fluoroscopically with an oblique view, the skin was sterilely prepped and draped in the usual fashion using Chloraprep skin prep  The skin and subcutaneous tissue were anesthetized with 0 5% lidocaine  A 5 inch 22 gauge spinal needle was then advanced under fluoroscopic guidance to the posterior aspect of the left L3-4 neural foramen  Appropriate foraminal depth was determined with a lateral fluoroscopic view, and AP visualization confirmed needle positioning at approximately the 6 oclock position relative to the pedicle    After negative aspiration, 1 mL Omnipaque 300 contrast was injected using live fluoroscopy/digital subtraction angiography, confirming appropriate transforaminal spread without evidence of intravascular or intrathecal uptake  Next, a local anesthetic test dose consisting of 1 mL of 2% lidocaine was injected through the needle  After an appropriate period of observation, a directed neurological exam was performed which revealed no new neurologic deficits  Next, a 1 5 ml solution consisting of 7 5 mg of dexamethasone in sterile saline was injected slowly and incrementally into the epidural space  Following the injection the needle was withdrawn slightly and flushed with lidocaine as it was fully extracted  The patient tolerated the procedure well and there were no apparent complications  The patient did not develop any new neurologic deficits  After appropriate observation, the patient was dismissed from the clinic in good condition under their own power  COMMENTS  The patient received a total steroid dose of 7 5 mg of dexamethasone     I was present for the entire procedure    Patient Disposition:  hemodynamically stable    SIGNATURE: Pamela Tipton MD  DATE: September 16, 2021  TIME: 8:47 AM

## 2021-09-16 NOTE — DISCHARGE INSTRUCTIONS
Epidural Steroid Injection   WHAT YOU NEED TO KNOW:   An epidural steroid injection (NADINE) is a procedure to inject steroid medicine into the epidural space  The epidural space is between your spinal cord and vertebrae  Steroids reduce inflammation and fluid buildup in your spine that may be causing pain  You may be given pain medicine along with the steroids  ACTIVITY  · Do not drive or operate machinery today  · No strenuous activity today - bending, lifting, etc   · You may resume normal activites starting tomorrow - start slowly and as tolerated  · You may shower today, but no tub baths or hot tubs  · You may have numbness for several hours from the local anesthetic  Please use caution and common sense, especially with weight-bearing activities  CARE OF THE INJECTION SITE  · If you have soreness or pain, apply ice to the area today (20 minutes on/20 minutes off)  · Starting tomorrow, you may use warm, moist heat or ice if needed  · You may have an increase or change in your discomfort for 36-48 hours after your treatment  · Apply ice and continue with any pain medication you have been prescribed  · Notify [facility] if you have any of the following: redness, drainage, swelling, headache, stiff neck or fever above 100°F     SPECIAL INSTRUCTIONS  · Our office will contact you in approximately 7 days for a progress report  MEDICATIONS  · Continue to take all routine medications  · Our office may have instructed you to hold some medications  As no general anesthesia was used in today's procedure, you should not experience any side effects related to anesthesia  If you have a problem specifically related to your procedure, please call our office at [phone]  Problems not related to your procedure should be directed to your primary care physician

## 2021-09-23 ENCOUNTER — TELEPHONE (OUTPATIENT)
Dept: PAIN MEDICINE | Facility: CLINIC | Age: 51
End: 2021-09-23

## 2021-09-23 NOTE — TELEPHONE ENCOUNTER
S/w pt, he reports 50% relief from the injection, he said this one worked better than the last  Pt said he still gets pain down his legs  Confirmed f/u appt

## 2021-10-20 ENCOUNTER — OFFICE VISIT (OUTPATIENT)
Dept: PAIN MEDICINE | Facility: CLINIC | Age: 51
End: 2021-10-20
Payer: COMMERCIAL

## 2021-10-20 VITALS — BODY MASS INDEX: 39.26 KG/M2 | HEIGHT: 71 IN | WEIGHT: 280.4 LBS

## 2021-10-20 DIAGNOSIS — M54.16 LUMBAR RADICULOPATHY: ICD-10-CM

## 2021-10-20 DIAGNOSIS — M79.18 MYOFASCIAL PAIN SYNDROME: ICD-10-CM

## 2021-10-20 DIAGNOSIS — M54.50 CHRONIC BILATERAL LOW BACK PAIN WITHOUT SCIATICA: ICD-10-CM

## 2021-10-20 DIAGNOSIS — G89.4 CHRONIC PAIN SYNDROME: Primary | ICD-10-CM

## 2021-10-20 DIAGNOSIS — G89.29 CHRONIC BILATERAL LOW BACK PAIN WITHOUT SCIATICA: ICD-10-CM

## 2021-10-20 PROCEDURE — 99214 OFFICE O/P EST MOD 30 MIN: CPT | Performed by: NURSE PRACTITIONER

## 2021-10-20 RX ORDER — DOXYCYCLINE 100 MG/1
CAPSULE ORAL
COMMUNITY
Start: 2021-09-30 | End: 2022-03-17

## 2021-10-20 RX ORDER — METHOCARBAMOL 500 MG/1
500 TABLET, FILM COATED ORAL 3 TIMES DAILY
Qty: 90 TABLET | Refills: 0 | Status: SHIPPED | OUTPATIENT
Start: 2021-10-20 | End: 2022-03-17

## 2021-10-20 RX ORDER — PEN NEEDLE, DIABETIC 29 G X1/2"
NEEDLE, DISPOSABLE MISCELLANEOUS
COMMUNITY
Start: 2021-07-23

## 2022-01-17 ENCOUNTER — TELEPHONE (OUTPATIENT)
Dept: INTERNAL MEDICINE CLINIC | Facility: CLINIC | Age: 52
End: 2022-01-17

## 2022-03-08 ENCOUNTER — TELEPHONE (OUTPATIENT)
Dept: INTERNAL MEDICINE CLINIC | Facility: CLINIC | Age: 52
End: 2022-03-08

## 2022-03-17 ENCOUNTER — OFFICE VISIT (OUTPATIENT)
Dept: INTERNAL MEDICINE CLINIC | Facility: CLINIC | Age: 52
End: 2022-03-17
Payer: COMMERCIAL

## 2022-03-17 VITALS
OXYGEN SATURATION: 98 % | HEART RATE: 60 BPM | HEIGHT: 71 IN | WEIGHT: 275.4 LBS | BODY MASS INDEX: 38.56 KG/M2 | SYSTOLIC BLOOD PRESSURE: 130 MMHG | TEMPERATURE: 99.2 F | DIASTOLIC BLOOD PRESSURE: 70 MMHG

## 2022-03-17 DIAGNOSIS — G89.4 CHRONIC PAIN SYNDROME: ICD-10-CM

## 2022-03-17 DIAGNOSIS — E78.2 MIXED HYPERLIPIDEMIA: ICD-10-CM

## 2022-03-17 DIAGNOSIS — Z12.5 SCREENING FOR PROSTATE CANCER: ICD-10-CM

## 2022-03-17 DIAGNOSIS — G89.29 CHRONIC BILATERAL LOW BACK PAIN WITHOUT SCIATICA: ICD-10-CM

## 2022-03-17 DIAGNOSIS — D69.6 THROMBOCYTOPENIA (HCC): ICD-10-CM

## 2022-03-17 DIAGNOSIS — I10 ESSENTIAL HYPERTENSION: ICD-10-CM

## 2022-03-17 DIAGNOSIS — M54.50 CHRONIC BILATERAL LOW BACK PAIN WITHOUT SCIATICA: ICD-10-CM

## 2022-03-17 DIAGNOSIS — E11.9 TYPE 2 DIABETES MELLITUS WITHOUT COMPLICATION, WITHOUT LONG-TERM CURRENT USE OF INSULIN (HCC): Primary | ICD-10-CM

## 2022-03-17 DIAGNOSIS — E11.9 ENCOUNTER FOR DIABETIC FOOT EXAM (HCC): ICD-10-CM

## 2022-03-17 LAB — SL AMB POCT HEMOGLOBIN AIC: 6.9 (ref ?–6.5)

## 2022-03-17 PROCEDURE — 83036 HEMOGLOBIN GLYCOSYLATED A1C: CPT | Performed by: INTERNAL MEDICINE

## 2022-03-17 PROCEDURE — 99214 OFFICE O/P EST MOD 30 MIN: CPT | Performed by: INTERNAL MEDICINE

## 2022-03-17 PROCEDURE — 3078F DIAST BP <80 MM HG: CPT | Performed by: INTERNAL MEDICINE

## 2022-03-17 PROCEDURE — 3044F HG A1C LEVEL LT 7.0%: CPT | Performed by: INTERNAL MEDICINE

## 2022-03-17 PROCEDURE — 3075F SYST BP GE 130 - 139MM HG: CPT | Performed by: INTERNAL MEDICINE

## 2022-03-17 RX ORDER — ATORVASTATIN CALCIUM 20 MG/1
20 TABLET, FILM COATED ORAL DAILY
Qty: 90 TABLET | Refills: 3 | Status: SHIPPED | OUTPATIENT
Start: 2022-03-17

## 2022-03-17 NOTE — PATIENT INSTRUCTIONS
Basic Carbohydrate Counting   AMBULATORY CARE:   Carbohydrate counting  is a way to plan your meals by counting the amount of carbohydrate in foods  Carbohydrates are the sugars, starches, and fiber found in fruit, grains, vegetables, and milk products  Carbohydrates increase your blood sugar levels  Carbohydrate counting can help you eat the right amount of carbohydrate to keep your blood sugar levels under control  What you need to know about planning meals using carbohydrate counting:  · A dietitian or healthcare provider will help you develop a healthy meal plan that works best for you  You will be taught how much carbohydrate to eat or drink for each meal and snack  Your meal plan will be based on your age, weight, usual food intake, and physical activity level  If you have diabetes, it will also include your blood sugar levels and diabetes medicine  Once you know how much carbohydrate you should eat, you can decide what type of food you want to eat  · You will need to know what foods contain carbohydrate and how much they contain  Keep track of the amount of carbohydrate in meals and snacks in order to follow your meal plan  Do not avoid carbohydrates or skip meals  Your blood sugar may fall too low if you do not eat enough carbohydrate or you skip meals  Foods that contain carbohydrate:   · Breads:  Each serving of food listed below contains about 15 g of carbohydrate   ? 1 slice of bread (1 ounce) or 1 flour or corn tortilla (6 inch)    ? ½ of a hamburger bun or ¼ of a large bagel (about 1 ounce)    ? 1 pancake (about 4 inches across and ¼ inch thick)    · Cereals and grains:  Serving sizes of ready-to-eat cereals vary  Look at the serving size and the total carbohydrate amount listed on the food label  Each serving of food listed below contains about 15 g of carbohydrate   ? ¾ cup of dry, unsweetened, ready-to-eat cereal or ¼ cup of low-fat granola     ?  ½ cup of oatmeal or other cooked cereal     ? ? cup of cooked rice or pasta    · Starchy vegetables and beans:  Each serving of food listed below contains about 15 g of carbohydrate   ? ½ cup of corn, green peas, sweet potatoes, or mashed potatoes    ? ¼ of a large baked potato    ? ½ cup of beans, lentils, and peas (garbanzo, mcginnis, kidney, white, split, black-eyed)    · Crackers and snacks:  Each serving of food listed below contains about 15 g of carbohydrate   ? 3 larry cracker squares or 8 animal crackers     ? 6 saltine-type crackers    ? 3 cups of popcorn or ¾ ounce of pretzels, potato chips, or tortilla chips    · Fruit:  Each serving of food listed below contains about 15 g of carbohydrate   ? 1 small (4 ounce) piece of fresh fruit or ¾ to 1 cup of fresh fruit    ? ½ cup of canned or frozen fruit, packed in natural juice    ? ½ cup (4 ounces) of unsweetened fruit juice    ? 2 tablespoons of dried fruit    · Desserts or sugary foods:  Each serving of food listed below contains about 15 g of carbohydrate   ? 2-inch square unfrosted cake or brownie     ? 2 small cookies    ? ½ cup of ice cream, frozen yogurt, or nondairy frozen yogurt    ? ¼ cup of sherbet or sorbet    ? 1 tablespoon of regular syrup, jam, or jelly    ? 2 tablespoons of light syrup    · Milk and yogurt:  Foods from the milk group contain about 12 g of carbohydrate per serving  ? 1 cup of fat-free or low-fat milk    ? 1 cup of soy milk    ? ? cup of fat-free, yogurt sweetened with artificial sweetener    · Non-starchy vegetables:  Each serving contains about 5 g of carbohydrate   Three servings of non-starch vegetables count as 1 carbohydrate serving  ? ½ cup of cooked vegetables or 1 cup of raw vegetables  This includes beets, broccoli, cabbage, cauliflower, cucumber, mushrooms, tomatoes, and zucchini    ?  ½ cup of vegetable juice    How to use carbohydrate counting to plan meals:   · Count carbohydrate amounts using serving sizes:      ? Pasta dinner example: You plan to have pasta, tossed salad, and an 8-ounce glass of milk  Your healthcare provider tells you that you may have 4 carbohydrate servings for dinner  One carbohydrate serving of pasta is ? cup  One cup of pasta will equal 3 carbohydrate servings  An 8-ounce glass of milk will count as 1 carbohydrate serving  These amounts of food would equal 4 carbohydrate servings  One cup of tossed salad does not count toward your carbohydrate servings  · Count carbohydrate amounts using food labels:  Find the total amount of carbohydrate in a packaged food by reading the food label  Food labels tell you the serving size of the food and the total carbohydrate amount in each serving  Find the serving size on the food label and then decide how many servings you will eat  Multiply the number of servings you plan to eat by the carbohydrate amount per serving  ? Granola bar snack example: Your meal plan allows you to have 2 carbohydrate servings (30 grams) of carbohydrate for a snack  You plan to eat 1 package of granola bars, which contains 2 bars  According to the food label, the serving size of food in this package is 1 bar  Each serving (1 bar) contains 25 grams of carbohydrate  The total amount of carbohydrate in this package of granola bars would be 50 g  Based on your meal plan, you should eat only 1 bar  Follow up with your doctor as directed:  Write down your questions so you remember to ask them during your visits  © Copyright [employer] 2022 Information is for End User's use only and may not be sold, redistributed or otherwise used for commercial purposes  All illustrations and images included in CareNotes® are the copyrighted property of A D A M , Inc  or [facility] Celine Coronado   The above information is an  only  It is not intended as medical advice for individual conditions or treatments   Talk to your doctor, nurse or pharmacist before following any medical regimen to see if it is safe and effective for you  Obesity   AMBULATORY CARE:   Obesity  means your body mass index (BMI) is greater than 30  Your healthcare provider will use your height and weight to measure your BMI  The risks of obesity include  many health problems, including injuries or physical disability  · Diabetes (high blood sugar level)    · High blood pressure or high cholesterol    · Heart disease    · Stroke    · Gallbladder or liver disease    · Cancer of the colon, breast, prostate, liver, or kidney    · Sleep apnea    · Arthritis or gout    Screening  is done to check for health conditions before you have signs or symptoms  If you are 28to 79years old, your blood sugar level may be checked every 3 years for signs of prediabetes or diabetes  Your healthcare provider will check your blood pressure at each visit  High blood pressure can lead to a stroke or other problems  Your provider may check for signs of heart disease, cancer, or other health problems  Seek care immediately if:   · You have a severe headache, confusion, or difficulty speaking  · You have weakness on one side of your body  · You have chest pain, sweating, or shortness of breath  Call your doctor if:   · You have symptoms of gallbladder or liver disease, such as pain in your upper abdomen  · You have knee or hip pain and discomfort while walking  · You have symptoms of diabetes, such as intense hunger and thirst, and frequent urination  · You have symptoms of sleep apnea, such as snoring or daytime sleepiness  · You have questions or concerns about your condition or care  Treatment for obesity  focuses on helping you lose weight to improve your health  Even a small decrease in BMI can reduce the risk for many health problems  Your healthcare provider will help you set a weight-loss goal   · Lifestyle changes  are the first step in treating obesity   These include making healthy food choices and getting regular physical activity  Your healthcare provider may suggest a weight-loss program that involves coaching, education, and therapy  · Medicine  may help you lose weight when it is used with a healthy foods and physical activity  · Surgery  can help you lose weight if you are very obese and have other health problems  There are several types of weight-loss surgery  Ask your healthcare provider for more information  Tips for safe weight loss:   · Set small, realistic goals  An example of a small goal is to walk for 20 minutes 5 days a week  Anther goal is to lose 5% of your body weight  · Tell friends, family members, and coworkers about your goals  and ask for their support  Ask a friend to lose weight with you, or join a weight-loss support group  · Identify foods or triggers that may cause you to overeat , and find ways to avoid them  Remove tempting high-calorie foods from your home and workplace  Place a bowl of fresh fruit on your kitchen counter  If stress causes you to eat, then find other ways to cope with stress  A counselor or therapist may be able to help you  · Keep a diary to track what you eat and drink  Also write down how many minutes of physical activity you do each day  Weigh yourself once a week and record it in your diary  Eating changes: You will need to eat 500 to 1,000 fewer calories each day than you currently eat to lose 1 to 2 pounds a week  The following changes will help you cut calories:  · Eat smaller portions  Use small plates, no larger than 9 inches in diameter  Fill your plate half full of fruits and vegetables  Measure your food using measuring cups until you know what a serving size looks like  · Eat 3 meals and 1 or 2 snacks each day  Plan your meals in advance  [place] and eat at home most of the time  Eat slowly  Do not skip meals  Skipping meals can lead to overeating later in the day  This can make it harder for you to lose weight   Talk with a dietitian to help you make a meal plan and schedule that is right for you  · Eat fruits and vegetables at every meal   They are low in calories and high in fiber, which makes you feel full  Do not add butter, margarine, or cream sauce to vegetables  Use herbs to season steamed vegetables  · Eat less fat and fewer fried foods  Eat more baked or grilled chicken and fish  These protein sources are lower in calories and fat than red meat  Limit fast food  Dress your salads with olive oil and vinegar instead of bottled dressing  · Limit the amount of sugar you eat  Do not drink sugary beverages  Limit alcohol  Activity changes:  Physical activity is good for your body in many ways  It helps you burn calories and build strong muscles  It decreases stress and depression, and improves your mood  It can also help you sleep better  Talk to your healthcare provider before you begin an exercise program   · Exercise for at least 30 minutes 5 days a week  Start slowly  Set aside time each day for physical activity that you enjoy and that is convenient for you  It is best to do both weight training and an activity that increases your heart rate, such as walking, bicycling, or swimming  · Find ways to be more active  Do yard work and housecleaning  Walk up the stairs instead of using elevators  Spend your leisure time going to events that require walking, such as outdoor festivals or fairs  This extra physical activity can help you lose weight and keep it off  Follow up with your doctor as directed: You may need to meet with a dietitian  Write down your questions so you remember to ask them during your visits  © [employer] 2022 Information is for End User's use only and may not be sold, redistributed or otherwise used for commercial purposes   All illustrations and images included in CareNotes® are the copyrighted property of A D A M , Inc  or Arturo Rossi  The above information is an  only  It is not intended as medical advice for individual conditions or treatments  Talk to your doctor, nurse or pharmacist before following any medical regimen to see if it is safe and effective for you  Low Fat Diet   AMBULATORY CARE:   A low-fat diet  is an eating plan that is low in total fat, unhealthy fat, and cholesterol  You may need to follow a low-fat diet if you have trouble digesting or absorbing fat  You may also need to follow this diet if you have high cholesterol  You can also lower your cholesterol by increasing the amount of fiber in your diet  Soluble fiber is a type of fiber that helps to decrease cholesterol levels  Different types of fat in food:   · Limit unhealthy fats  A diet that is high in cholesterol, saturated fat, and trans fat may cause unhealthy cholesterol levels  Unhealthy cholesterol levels increase your risk of heart disease  ? Cholesterol:  Limit intake of cholesterol to less than 200 mg per day  Cholesterol is found in meat, eggs, and dairy  ? Saturated fat:  Limit saturated fat to less than 7% of your total daily calories  Ask your dietitian how many calories you need each day  Saturated fat is found in butter, cheese, ice cream, whole milk, and palm oil  Saturated fat is also found in meat, such as beef, pork, chicken skin, and processed meats  Processed meats include sausage, hot dogs, and bologna  ? Trans fat:  Avoid trans fat as much as possible  Trans fat is used in fried and baked foods  Foods that say trans fat free on the label may still have up to 0 5 grams of trans fat per serving  · Include healthy fats  Replace foods that are high in saturated and trans fat with foods high in healthy fats  This may help to decrease high cholesterol levels  ? Monounsaturated fats: These are found in avocados, nuts, and vegetable oils, such as olive, canola, and sunflower oil  ? Polyunsaturated fats:   These can be found in vegetable oils, such as soybean or corn oil  Omega-3 fats can help to decrease the risk of heart disease  Omega-3 fats are found in fish, such as salmon, herring, trout, and tuna  Omega-3 fats can also be found in plant foods, such as walnuts, flaxseed, soybeans, and canola oil  Foods to limit or avoid:   · Grains:      ? Snacks that are made with partially hydrogenated oils, such as chips, regular crackers, and butter-flavored popcorn    ? High-fat baked goods, such as biscuits, croissants, doughnuts, pies, cookies, and pastries    · Dairy:      ? Whole milk, 2% milk, and yogurt and ice cream made with whole milk    ? Half and half creamer, heavy cream, and whipping cream    ? Cheese, cream cheese, and sour cream    · Meats and proteins:      ? High-fat cuts of meat (T-bone steak, regular hamburger, and ribs)    ? Fried meat, poultry (turkey and chicken), and fish    ? Poultry (chicken and turkey) with skin    ? Cold cuts (salami or bologna), hot dogs, kerns, and sausage    ? Whole eggs and egg yolks    · Vegetables and fruits with added fat:      ? Fried vegetables or vegetables in butter or high-fat sauces, such as cream or cheese sauces    ? Fried fruit or fruit served with butter or cream    · Fats:      ? Butter, stick margarine, and shortening    ? Coconut, palm oil, and palm kernel oil    Foods to include:   · Grains:      ? Whole-grain breads, cereals, pasta, and brown rice    ? Low-fat crackers and pretzels    · Vegetables and fruits:      ? Fresh, frozen, or canned vegetables (no salt or low-sodium)    ? Fresh, frozen, dried, or canned fruit (canned in light syrup or fruit juice)    ? Avocado    · Low-fat dairy products:      ? Nonfat (skim) or 1% milk    ? Nonfat or low-fat cheese, yogurt, and cottage cheese    · Meats and proteins:      ? Chicken or turkey with no skin    ? Baked or broiled fish    ? Lean beef and pork (loin, round, extra lean hamburger)    ?  Beans and peas, unsalted nuts, soy products    ? Egg whites and substitutes    ? Seeds and nuts    · Fats:      ? Unsaturated oil, such as canola, olive, peanut, soybean, or sunflower oil    ? Soft or liquid margarine and vegetable oil spread    ? Low-fat salad dressing    Other ways to decrease fat:   · Read food labels before you buy foods  Choose foods that have less than 30% of calories from fat  Choose low-fat or fat-free dairy products  Remember that fat free does not mean calorie free  These foods still contain calories, and too many calories can lead to weight gain  · Trim fat from meat and avoid fried food  Trim all visible fat from meat before you cook it  Remove the skin from poultry  Do not vivas meat, fish, or poultry  Bake, roast, boil, or broil these foods instead  Avoid fried foods  Eat a baked potato instead of [place] fries  Steam vegetables instead of sautéing them in butter  · Add less fat to foods  Use imitation kerns bits on salads and baked potatoes instead of regular kerns bits  Use fat-free or low-fat salad dressings instead of regular dressings  Use low-fat or nonfat butter-flavored topping instead of regular butter or margarine on popcorn and other foods  Ways to decrease fat in recipes:  Replace high-fat ingredients with low-fat or nonfat ones  This may cause baked goods to be drier than usual  You may need to use nonfat cooking spray on pans to prevent food from sticking  You also may need to change the amount of other ingredients, such as water, in the recipe  Try the following:  · Use low-fat or light margarine instead of regular margarine or shortening  · Use lean ground turkey breast or chicken, or lean ground beef (less than 5% fat) instead of hamburger  · Add 1 teaspoon of canola oil to 8 ounces of skim milk instead of using cream or half and half  · Use grated zucchini, carrots, or apples in breads instead of coconut      · Use blenderized, low-fat cottage cheese, plain tofu, or low-fat ricotta cheese instead of cream cheese  · Use 1 egg white and 1 teaspoon of canola oil, or use ¼ cup (2 ounces) of fat-free egg substitute instead of a whole egg  · Replace half of the oil that is called for in a recipe with applesauce when you bake  Use 3 tablespoons of cocoa powder and 1 tablespoon of canola oil instead of a square of baking chocolate  How to increase fiber:  Eat enough high-fiber foods to get 20 to 30 grams of fiber every day  Slowly increase your fiber intake to avoid stomach cramps, gas, and other problems  · Eat 3 ounces of whole-grain foods each day  An ounce is about 1 slice of bread  Eat whole-grain breads, such as whole-wheat bread  Whole wheat, whole-wheat flour, or other whole grains should be listed as the first ingredient on the food label  Replace white flour with whole-grain flour or use half of each in recipes  Whole-grain flour is heavier than white flour, so you may have to add more yeast or baking powder  · Eat a high-fiber cereal for breakfast   Oatmeal is a good source of soluble fiber  Look for cereals that have bran or fiber in the name  Choose whole-grain products, such as brown rice, barley, and whole-wheat pasta  · Eat more beans, peas, and lentils  For example, add beans to soups or salads  Eat at least 5 cups of fruits and vegetables each day  Eat fruits and vegetables with the peel because the peel is high in fiber  © Copyright [employer] 2022 Information is for End User's use only and may not be sold, redistributed or otherwise used for commercial purposes  All illustrations and images included in CareNotes® are the copyrighted property of A D A M , Inc  or [facility] Celine Coronado   The above information is an  only  It is not intended as medical advice for individual conditions or treatments  Talk to your doctor, nurse or pharmacist before following any medical regimen to see if it is safe and effective for you      Heart Healthy Diet   AMBULATORY CARE:   A heart healthy diet  is an eating plan low in unhealthy fats and sodium (salt)  The plan is high in healthy fats and fiber  A heart healthy diet helps improve your cholesterol levels and lowers your risk for heart disease and stroke  A dietitian will teach you how to read and understand food labels  Heart healthy diet guidelines to follow:   · Choose foods that contain healthy fats  ? Unsaturated fats  include monounsaturated and polyunsaturated fats  Unsaturated fat is found in foods such as soybean, canola, olive, corn, and safflower oils  It is also found in soft tub margarine that is made with liquid vegetable oil  ? Omega-3 fat  is found in certain fish, such as salmon, tuna, and trout, and in walnuts and flaxseed  Eat fish high in omega-3 fats at least 2 times a week  · Get 20 to 30 grams of fiber each day  Fruits, vegetables, whole-grain foods, and legumes (cooked beans) are good sources of fiber  · Limit or do not have unhealthy fats  ? Cholesterol  is found in animal foods, such as eggs and lobster, and in dairy products made from whole milk  Limit cholesterol to less than 200 mg each day  ? Saturated fat  is found in meats, such as kerns and hamburger  It is also found in chicken or turkey skin, whole milk, and butter  Limit saturated fat to less than 7% of your total daily calories  ? Trans fat  is found in packaged foods, such as potato chips and cookies  It is also in hard margarine, some fried foods, and shortening  Do not eat foods that contain trans fats  · Limit sodium as directed  You may be told to limit sodium to 2,000 to 2,300 mg each day  Choose low-sodium or no-salt-added foods  Add little or no salt to food you prepare  Use herbs and spices in place of salt         Include the following in your heart healthy plan:  Ask your dietitian or healthcare provider how many servings to have from each of the following food groups:  · Grains:      ? Whole-wheat breads, cereals, and pastas, and brown rice    ? Low-fat, low-sodium crackers and chips    · Vegetables:      ? Broccoli, green beans, green peas, and spinach    ? Collards, kale, and lima beans    ? Carrots, sweet potatoes, tomatoes, and peppers    ? Canned vegetables with no salt added    · Fruits:      ? Bananas, peaches, pears, and pineapple    ? Grapes, raisins, and dates    ? Oranges, tangerines, grapefruit, orange juice, and grapefruit juice    ? Apricots, mangoes, melons, and papaya    ? Raspberries and strawberries    ? Canned fruit with no added sugar    · Low-fat dairy:      ? Nonfat (skim) milk, 1% milk, and low-fat almond, cashew, or soy milks fortified with calcium    ? Low-fat cheese, regular or frozen yogurt, and cottage cheese    · Meats and proteins:      ? Lean cuts of beef and pork (loin, leg, round), skinless chicken and turkey    ? Legumes, soy products, egg whites, or nuts    Limit or do not include the following in your heart healthy plan:   · Unhealthy fats and oils:      ? Whole or 2% milk, cream cheese, sour cream, or cheese    ? High-fat cuts of beef (T-bone steaks, ribs), chicken or turkey with skin, and organ meats such as liver    ? Butter, stick margarine, shortening, and cooking oils such as coconut or palm oil    · Foods and liquids high in sodium:      ? Packaged foods, such as frozen dinners, cookies, macaroni and cheese, and cereals with more than 300 mg of sodium per serving    ? Vegetables with added sodium, such as instant potatoes, vegetables with added sauces, or regular canned vegetables    ? Cured or smoked meats, such as hot dogs, kerns, and sausage    ? High-sodium ketchup, barbecue sauce, salad dressing, pickles, olives, soy sauce, or miso    · Foods and liquids high in sugar:      ? Candy, cake, cookies, pies, or doughnuts    ? Soft drinks (soda), sports drinks, or sweetened tea    ?  Canned or dry mixes for cakes, soups, sauces, or gravies    Other healthy heart guidelines:   · Do not smoke  Nicotine and other chemicals in cigarettes and cigars can cause lung and heart damage  Ask your healthcare provider for information if you currently smoke and need help to quit  E-cigarettes or smokeless tobacco still contain nicotine  Talk to your healthcare provider before you use these products  · Limit or do not drink alcohol as directed  Alcohol can damage your heart and raise your blood pressure  Your healthcare provider may give you specific daily and weekly limits  The general recommended limit is 1 drink a day for women 21 or older and for men 72 or older  Do not have more than 3 drinks in a day or 7 in a week  The recommended limit is 2 drinks a day for men 24to 59years of age  Do not have more than 4 drinks in a day or 14 in a week  A drink of alcohol is 12 ounces of beer, 5 ounces of wine, or 1½ ounces of liquor  · Exercise regularly  Exercise can help you maintain a healthy weight and improve your blood pressure and cholesterol levels  Regular exercise can also decrease your risk for heart problems  Ask your healthcare provider about the best exercise plan for you  Do not start an exercise program without asking your healthcare provider  Follow up with your doctor or cardiologist as directed:  Write down your questions so you remember to ask them during your visits  © [employer] 2022 Information is for End User's use only and may not be sold, redistributed or otherwise used for commercial purposes  All illustrations and images included in CareNotes® are the copyrighted property of [employer]  or Arturo Coronado   The above information is an  only  It is not intended as medical advice for individual conditions or treatments  Talk to your doctor, nurse or pharmacist before following any medical regimen to see if it is safe and effective for you

## 2022-03-17 NOTE — PROGRESS NOTES
BMI Counseling: There is no height or weight on file to calculate BMI  The BMI is above normal  Nutrition recommendations include decreasing portion sizes and encouraging healthy choices of fruits and vegetables  Exercise recommendations include moderate physical activity 150 minutes/week  No pharmacotherapy was ordered  Rationale for BMI follow-up plan is due to patient being overweight or obese  Assessment/Plan:  Problem List Items Addressed This Visit        Endocrine    Type 2 diabetes mellitus without complication ([facility] [address] ) - Primary    Relevant Orders    CBC and differential    Comprehensive metabolic panel    Lipid Panel with Direct LDL reflex    TSH, 3rd generation with Free T4 reflex    Microalbumin / creatinine urine ratio    POCT hemoglobin A1c       Cardiovascular and Mediastinum    Essential hypertension    Relevant Orders    Comprehensive metabolic panel       Other    Thrombocytopenia (HCC)    Relevant Orders    CBC and differential    Mixed hyperlipidemia    Chronic pain syndrome    Chronic bilateral low back pain without sciatica      Other Visit Diagnoses     Screening for prostate cancer        Relevant Orders    PSA, Total Screen           Diagnoses and all orders for this visit:    Type 2 diabetes mellitus without complication, without long-term current use of insulin (HCC)  -     CBC and differential; Future  -     Comprehensive metabolic panel; Future  -     Lipid Panel with Direct LDL reflex; Future  -     TSH, 3rd generation with Free T4 reflex; Future  -     Microalbumin / creatinine urine ratio  -     POCT hemoglobin A1c    Essential hypertension  -     Comprehensive metabolic panel; Future    Mixed hyperlipidemia    Thrombocytopenia (HCC)  -     CBC and differential; Future    Chronic pain syndrome    Chronic bilateral low back pain without sciatica    Screening for prostate cancer  -     PSA, Total Screen;  Future        No problem-specific Assessment & Plan notes found for this encounter  A/P: Doing well and will check labs  In office HgA1c was much better at 6 9    Discussed BMI and will give info on diet and exercise  Continue current treatment and RTC in three months for routine  Subjective:      Patient ID: Michelle Mon is a 54 y o  male  WM RTC for f/u DM, HTN, etc  Doing well and no new issues  Remains active and no falls  Sugars are less than 140 and no low sugar events  Chronic pain is manageable  Due for labs  The following portions of the patient's history were reviewed and updated as appropriate:   He has a past medical history of Diabetes mellitus ([facility] [address] ), Hyperlipidemia, Hypertension, and Kidney stones  ,  does not have any pertinent problems on file  ,   has a past surgical history that includes Neck surgery; Cholecystectomy; Epidural block injection (Left, 6/3/2021); FL guided needle plac bx/asp/inj (6/3/2021); Epidural block injection (Left, 9/16/2021); and FL guided needle plac bx/asp/inj (9/16/2021)  ,  family history includes Breast cancer in his mother; Diabetes in his father, mother, and sister; Fibromyalgia in his sister; Hypertension in his mother  ,   reports that he has never smoked  He has never used smokeless tobacco  He reports that he does not drink alcohol and does not use drugs  ,  is allergic to codeine, lisinopril, metformin, neurontin [gabapentin], and penicillins     Current Outpatient Medications   Medication Sig Dispense Refill    Ascorbic Acid (VITAMIN C) 1000 MG tablet Take 1,000 mg by mouth daily      atorvastatin (LIPITOR) 20 mg tablet TAKE 1 TABLET BY MOUTH EVERY DAY 90 tablet 3    Stacey Microlet Lancets lancets Use as instructed 100 each 1    BD Insulin Syringe U/F 31G X 5/16" 0 3 ML MISC USE DAILY USE 1 TIME DAILY AS DIRECTED      Blood Glucose Monitoring Suppl ([employer] CONTOUR MONITOR) TRACEY by Does not apply route daily 1 Device 0    Cholecalciferol (VITAMIN D3) 360888 UNIT/GM POWD by Does not apply route      Dapagliflozin Propanediol (Farxiga) 10 MG TABS Take 1 tablet (10 mg total) by mouth daily 90 tablet 1    doxycycline monohydrate (MONODOX) 100 mg capsule       Empagliflozin 10 MG TABS Take 1 tablet (10 mg total) by mouth every morning 90 tablet 1    glimepiride (AMARYL) 4 mg tablet TAKE 2 TABLETS DAILY 180 tablet 1    glucose blood (Contour Next Test) test strip CHECK BLOOD GLUCOSE TWICE A  strip 1    insulin glargine (LANTUS) 100 units/mL subcutaneous injection Inject 10 Units under the skin daily at bedtime 10 mL 3    Insulin Syringe-Needle U-100 (INSULIN SYRINGE  3CC/30GX5/16") 30G X 5/16" 0 3 ML MISC Use daily Use 1 time daily as directed 100 each 5    methocarbamol (ROBAXIN) 500 mg tablet Take 1 tablet (500 mg total) by mouth 3 (three) times a day As needed for pain/spasms 90 tablet 0    metoprolol succinate (TOPROL-XL) 100 mg 24 hr tablet TAKE 1/2 TABLET BY MOUTH DAILY 45 tablet 3    predniSONE 50 mg tablet Take 1 tablet (50 mg total) by mouth daily 4 tablet 0     No current facility-administered medications for this visit  Review of Systems   Constitutional: Negative for activity change, chills, diaphoresis, fatigue and fever  HENT: Negative  Eyes: Negative for visual disturbance  Respiratory: Negative for cough, chest tightness, shortness of breath and wheezing  Cardiovascular: Negative for chest pain, palpitations and leg swelling  Gastrointestinal: Negative for abdominal pain, constipation, diarrhea, nausea and vomiting  Endocrine: Negative for cold intolerance and heat intolerance  Genitourinary: Negative for difficulty urinating, dysuria and frequency  Musculoskeletal: Negative for arthralgias, gait problem and myalgias  Neurological: Negative for dizziness, seizures, syncope, weakness, light-headedness and headaches  Psychiatric/Behavioral: Negative for confusion, dysphoric mood and sleep disturbance  The patient is not nervous/anxious          PHQ-2/9 Depression Screening Objective: There were no vitals filed for this visit  There is no height or weight on file to calculate BMI  Physical Exam  Vitals and nursing note reviewed  Constitutional:       General: He is not in acute distress  Appearance: Normal appearance  He is not ill-appearing  HENT:      Head: Normocephalic and atraumatic  Mouth/Throat:      Mouth: Mucous membranes are moist    Eyes:      Extraocular Movements: Extraocular movements intact  Conjunctiva/sclera: Conjunctivae normal       Pupils: Pupils are equal, round, and reactive to light  Neck:      Vascular: No carotid bruit  Cardiovascular:      Rate and Rhythm: Normal rate and regular rhythm  Pulses: no weak pulses          Dorsalis pedis pulses are 1+ on the right side and 1+ on the left side  Posterior tibial pulses are 1+ on the right side and 1+ on the left side  Heart sounds: Normal heart sounds  Pulmonary:      Effort: Pulmonary effort is normal  No respiratory distress  Breath sounds: Normal breath sounds  No wheezing or rales  Abdominal:      General: Bowel sounds are normal  There is no distension  Palpations: Abdomen is soft  Tenderness: There is no abdominal tenderness  Musculoskeletal:      Cervical back: Neck supple  Right lower leg: No edema  Left lower leg: No edema  Feet:      Right foot:      Skin integrity: No ulcer, skin breakdown, erythema, warmth, callus or dry skin  Left foot:      Skin integrity: No ulcer, skin breakdown, erythema, warmth, callus or dry skin  Neurological:      General: No focal deficit present  Mental Status: He is alert and oriented to person, place, and time  Mental status is at baseline  Psychiatric:         Mood and Affect: Mood normal          Behavior: Behavior normal          Thought Content: Thought content normal          Judgment: Judgment normal            Patient's shoes and socks removed      Right Foot/Ankle Right Foot Inspection  Skin Exam: skin normal and skin intact  No dry skin, no warmth, no callus, no erythema, no maceration, no abnormal color, no pre-ulcer, no ulcer and no callus  Toe Exam: ROM and strength within normal limits  No swelling, no tenderness, erythema and  no right toe deformity    Sensory   Monofilament testing: intact    Vascular  Capillary refills: < 3 seconds  The right DP pulse is 1+  The right PT pulse is 1+  Left Foot/Ankle  Left Foot Inspection  Skin Exam: skin normal and skin intact  No dry skin, no warmth, no erythema, no maceration, normal color, no pre-ulcer, no ulcer and no callus  Toe Exam: ROM and strength within normal limits  No swelling, no tenderness, no erythema and no left toe deformity  Sensory   Monofilament testing: intact    Vascular  Capillary refills: < 3 seconds  The left DP pulse is 1+  The left PT pulse is 1+  Assign Risk Category  No deformity present  No loss of protective sensation  No weak pulses  Risk: 0    BMI Counseling: Body mass index is 38 41 kg/m²  The BMI is above normal  Nutrition recommendations include reducing portion sizes, decreasing overall calorie intake, reducing intake of saturated fat and trans fat and reducing intake of cholesterol  Exercise recommendations include moderate aerobic physical activity for 150 minutes/week

## 2022-05-09 DIAGNOSIS — I10 ESSENTIAL HYPERTENSION: ICD-10-CM

## 2022-05-09 DIAGNOSIS — E11.9 TYPE 2 DIABETES MELLITUS WITHOUT COMPLICATION, WITHOUT LONG-TERM CURRENT USE OF INSULIN (HCC): ICD-10-CM

## 2022-05-09 DIAGNOSIS — Z78.9 DRUG INTOLERANCE: ICD-10-CM

## 2022-05-09 RX ORDER — METOPROLOL SUCCINATE 100 MG/1
TABLET, EXTENDED RELEASE ORAL
Qty: 45 TABLET | Refills: 3 | Status: SHIPPED | OUTPATIENT
Start: 2022-05-09 | End: 2022-07-21 | Stop reason: SDUPTHER

## 2022-05-09 RX ORDER — GLIMEPIRIDE 4 MG/1
TABLET ORAL
Qty: 180 TABLET | Refills: 1 | Status: SHIPPED | OUTPATIENT
Start: 2022-05-09

## 2022-06-11 NOTE — PROGRESS NOTES
Daily Note     Today's date: 3/31/2021  Patient name: Azucena Li  : 1970  MRN: [id number]  Referring provider: Willim Gitelman, DO  Dx:   Encounter Diagnosis     ICD-10-CM    1  DDD (degenerative disc disease), lumbar  M51 36                   Subjective: Pain at L knee today and it feels like my foot wants to turn outward when I walk  Pt reports some tightness at L LB  Objective: See treatment diary below    Assessment: Tolerated treatment well  No advancements today as pt reports feeling challenged with current program  He reports SL ABD to be most difficult with hip muscle soreness and fatigue  Pt denied increased LBP with TE and self stretch  Patient would benefit from continued PT    Plan: Continue per plan of care        Re-eval Date: 21    Date 3/24 3/26/21 3-31-21     Visit Count 6 7 8     FOTO              Precautions: chronic back pain       Manuals 3/24 3/26/21 3-31-21  3-18-21    Self HS   30" x 4 jamshid  Self HS   30" x 4 Jamshid Self HS    30" x 4 Jamshid  Self stretch with vcs   20" x 3   Neuro Re-Ed         TA      5" x 10   TA + marches      1  x10   TA + MTP/LTP Green 1 x 15 Green 1 x 20  Green 1  x20  Green 1  x10   Palloff press                                 Ther Ex        NuStep  L3 10 min  L3  10 min  L 3  10'  L 3  10'   LTR  HS stretch         SBB 5" x 10  5" x 10  5" x 10  5" 1 x 10   Standing hip flex/abd/ext  3 way   2 x 10  3 way  2 x 10 Foam  Foam   2 x 10 ea  3-way  1 x 15   Standing marches  2 x 10  2 x 10  Foam  Foam 2  x10  1 x 10   Squats with postural awareness         Step-ups         SLR 1 x 10 Jamshid 1 x 15 Jamshid 1  x15 Jamshid  1 x 10 jamshid   S/L SLR  1 x 10 Jamshid 1 x 15 Jamshid 1 x 15  1 x 10 Jamshid   Clamshells 1 x 10  1 x 15  1  x15  1  x10   Hip add  2 x 10  2 x 10  2 x 10  3" 1 x 15   Bridges  1 x 15  1 x 15  1 x 15   1 x 10   Hip Abd TBand Green x 20  Green x 20 Green x 20  Green x 15   Cat and camel         Quad alt UE        Quad alt LE                 Ther Activity Patient's FSGs are controlled on current medication regimen.  Last A1c reviewed-   Lab Results   Component Value Date    HGBA1C 6.7 (H) 12/02/2021     Most recent fingerstick glucose reviewed-   Recent Labs   Lab 06/09/22  2348   POCTGLUCOSE 132*     Current correctional scale  Low  Maintain anti-hyperglycemic dose as follows-   Antihyperglycemics (From admission, onward)            Start     Stop Route Frequency Ordered    06/08/22 2113  insulin aspart U-100 pen 0-5 Units         -- SubQ Before meals & nightly PRN 06/08/22 2015        Hold Oral hypoglycemics while patient is in the hospital.       Gait Training                        Modalities

## 2022-06-24 LAB
LEFT EYE DIABETIC RETINOPATHY: NORMAL
RIGHT EYE DIABETIC RETINOPATHY: NORMAL

## 2022-07-21 ENCOUNTER — TELEPHONE (OUTPATIENT)
Dept: INTERNAL MEDICINE CLINIC | Facility: CLINIC | Age: 52
End: 2022-07-21

## 2022-07-21 DIAGNOSIS — I10 ESSENTIAL HYPERTENSION: ICD-10-CM

## 2022-07-21 RX ORDER — METOPROLOL SUCCINATE 100 MG/1
50 TABLET, EXTENDED RELEASE ORAL DAILY
Qty: 45 TABLET | Refills: 3 | Status: SHIPPED | OUTPATIENT
Start: 2022-07-21

## 2022-08-13 DIAGNOSIS — E11.9 TYPE 2 DIABETES MELLITUS WITHOUT COMPLICATION, WITHOUT LONG-TERM CURRENT USE OF INSULIN (HCC): ICD-10-CM

## 2022-08-14 RX ORDER — PERPHENAZINE 16 MG/1
TABLET, FILM COATED ORAL
Qty: 100 STRIP | Refills: 1 | Status: SHIPPED | OUTPATIENT
Start: 2022-08-14

## 2022-08-22 ENCOUNTER — HOSPITAL ENCOUNTER (EMERGENCY)
Facility: HOSPITAL | Age: 52
Discharge: HOME | End: 2022-08-22
Attending: EMERGENCY MEDICINE
Payer: COMMERCIAL

## 2022-08-22 VITALS
WEIGHT: 260.58 LBS | OXYGEN SATURATION: 95 % | TEMPERATURE: 97.6 F | DIASTOLIC BLOOD PRESSURE: 87 MMHG | BODY MASS INDEX: 35.3 KG/M2 | HEART RATE: 85 BPM | HEIGHT: 72 IN | RESPIRATION RATE: 16 BRPM | SYSTOLIC BLOOD PRESSURE: 178 MMHG

## 2022-08-22 DIAGNOSIS — G89.29 CHRONIC BILATERAL LOW BACK PAIN WITHOUT SCIATICA: Primary | ICD-10-CM

## 2022-08-22 DIAGNOSIS — M54.50 CHRONIC BILATERAL LOW BACK PAIN WITHOUT SCIATICA: Primary | ICD-10-CM

## 2022-08-22 DIAGNOSIS — M54.50 LOWER BACK PAIN: ICD-10-CM

## 2022-08-22 DIAGNOSIS — E80.6 HYPERBILIRUBINEMIA: ICD-10-CM

## 2022-08-22 LAB
ALBUMIN SERPL BCP-MCNC: 3.5 G/DL (ref 3.5–5)
ALP SERPL-CCNC: 96 U/L (ref 46–116)
ALT SERPL W P-5'-P-CCNC: 71 U/L (ref 12–78)
ANION GAP SERPL CALCULATED.3IONS-SCNC: 7 MMOL/L (ref 4–13)
AST SERPL W P-5'-P-CCNC: 61 U/L (ref 5–45)
BASOPHILS # BLD AUTO: 0.03 THOUSANDS/ΜL (ref 0–0.1)
BASOPHILS NFR BLD AUTO: 1 % (ref 0–1)
BILIRUB SERPL-MCNC: 1.24 MG/DL (ref 0.2–1)
BUN SERPL-MCNC: 12 MG/DL (ref 5–25)
CALCIUM SERPL-MCNC: 8.8 MG/DL (ref 8.3–10.1)
CHLORIDE SERPL-SCNC: 104 MMOL/L (ref 96–108)
CO2 SERPL-SCNC: 30 MMOL/L (ref 21–32)
CREAT SERPL-MCNC: 0.91 MG/DL (ref 0.6–1.3)
EOSINOPHIL # BLD AUTO: 0.04 THOUSAND/ΜL (ref 0–0.61)
EOSINOPHIL NFR BLD AUTO: 1 % (ref 0–6)
ERYTHROCYTE [DISTWIDTH] IN BLOOD BY AUTOMATED COUNT: 13.9 % (ref 11.6–15.1)
GFR SERPL CREATININE-BSD FRML MDRD: 97 ML/MIN/1.73SQ M
GLUCOSE SERPL-MCNC: 152 MG/DL (ref 65–140)
HCT VFR BLD AUTO: 52.8 % (ref 36.5–49.3)
HGB BLD-MCNC: 16.8 G/DL (ref 12–17)
IMM GRANULOCYTES # BLD AUTO: 0 THOUSAND/UL (ref 0–0.2)
IMM GRANULOCYTES NFR BLD AUTO: 0 % (ref 0–2)
LYMPHOCYTES # BLD AUTO: 0.78 THOUSANDS/ΜL (ref 0.6–4.47)
LYMPHOCYTES NFR BLD AUTO: 22 % (ref 14–44)
MCH RBC QN AUTO: 28.3 PG (ref 26.8–34.3)
MCHC RBC AUTO-ENTMCNC: 31.8 G/DL (ref 31.4–37.4)
MCV RBC AUTO: 89 FL (ref 82–98)
MONOCYTES # BLD AUTO: 0.58 THOUSAND/ΜL (ref 0.17–1.22)
MONOCYTES NFR BLD AUTO: 16 % (ref 4–12)
NEUTROPHILS # BLD AUTO: 2.17 THOUSANDS/ΜL (ref 1.85–7.62)
NEUTS SEG NFR BLD AUTO: 60 % (ref 43–75)
NRBC BLD AUTO-RTO: 0 /100 WBCS
PLATELET # BLD AUTO: 55 THOUSANDS/UL (ref 149–390)
PMV BLD AUTO: 11.2 FL (ref 8.9–12.7)
POTASSIUM SERPL-SCNC: 3.6 MMOL/L (ref 3.5–5.3)
PROT SERPL-MCNC: 7.5 G/DL (ref 6.4–8.4)
RBC # BLD AUTO: 5.93 MILLION/UL (ref 3.88–5.62)
SODIUM SERPL-SCNC: 141 MMOL/L (ref 135–147)
WBC # BLD AUTO: 3.6 THOUSAND/UL (ref 4.31–10.16)

## 2022-08-22 PROCEDURE — 96372 THER/PROPH/DIAG INJ SC/IM: CPT

## 2022-08-22 PROCEDURE — 99285 EMERGENCY DEPT VISIT HI MDM: CPT | Performed by: PHYSICIAN ASSISTANT

## 2022-08-22 PROCEDURE — 99283 EMERGENCY DEPT VISIT LOW MDM: CPT

## 2022-08-22 PROCEDURE — 85025 COMPLETE CBC W/AUTO DIFF WBC: CPT | Performed by: PHYSICIAN ASSISTANT

## 2022-08-22 PROCEDURE — 80053 COMPREHEN METABOLIC PANEL: CPT | Performed by: PHYSICIAN ASSISTANT

## 2022-08-22 PROCEDURE — 36415 COLL VENOUS BLD VENIPUNCTURE: CPT | Performed by: PHYSICIAN ASSISTANT

## 2022-08-22 RX ORDER — LIDOCAINE 50 MG/G
1 PATCH TOPICAL ONCE
Status: DISCONTINUED | OUTPATIENT
Start: 2022-08-22 | End: 2022-08-22 | Stop reason: HOSPADM

## 2022-08-22 RX ORDER — DIAZEPAM 5 MG/ML
5 INJECTION, SOLUTION INTRAMUSCULAR; INTRAVENOUS ONCE
Status: COMPLETED | OUTPATIENT
Start: 2022-08-22 | End: 2022-08-22

## 2022-08-22 RX ORDER — CYCLOBENZAPRINE HCL 10 MG
10 TABLET ORAL 3 TIMES DAILY PRN
Qty: 9 TABLET | Refills: 0 | Status: SHIPPED | OUTPATIENT
Start: 2022-08-22 | End: 2022-08-26

## 2022-08-22 RX ORDER — MELOXICAM 7.5 MG/1
7.5 TABLET ORAL DAILY
Qty: 5 TABLET | Refills: 0 | Status: SHIPPED | OUTPATIENT
Start: 2022-08-22 | End: 2022-08-26

## 2022-08-22 RX ORDER — KETOROLAC TROMETHAMINE 30 MG/ML
15 INJECTION, SOLUTION INTRAMUSCULAR; INTRAVENOUS ONCE
Status: COMPLETED | OUTPATIENT
Start: 2022-08-22 | End: 2022-08-22

## 2022-08-22 RX ADMIN — DIAZEPAM 5 MG: 10 INJECTION, SOLUTION INTRAMUSCULAR; INTRAVENOUS at 13:51

## 2022-08-22 RX ADMIN — KETOROLAC TROMETHAMINE 15 MG: 30 INJECTION, SOLUTION INTRAMUSCULAR at 13:53

## 2022-08-22 NOTE — Clinical Note
Moisés Aburto was seen and treated in our emergency department on 8/22/2022  Diagnosis: low back pain    [place]obe    He may return on this date: 08/25/2022         If you have any questions or concerns, please don't hesitate to call        Patito Garay PA-C    ______________________________           _______________          _______________  Hospital Representative                              Date                                Time

## 2022-08-22 NOTE — ED PROVIDER NOTES
History  Chief Complaint   Patient presents with    Back Pain     Patient reporting lower back pain since Thursday 8/18/2022; denies injury; reports this occurring last year around the same time and received Cortisone injections last September     This is a 42-year-old started with low back pain August 18, 2020 right sided low back no radicular symptoms no change in bowel bladder no saddle anesthesia no IV drug use  No fever no chills  Had similar symptoms approximately 1 year ago and underwent epidural steroid injections with limited his pain  He did call pain management and may not able to see him until September 8th approximately  Patient did take a home oxycodone without improvement  Made worse when he walks any moves made better when he lays down  Denies any fever chills headache blurred vision double vision cough congestion sore throat no chest pain or shortness of breath no nausea vomiting diarrhea abdominal pain no urinary symptoms no testicular pain  Patient walked in under his own power  His son drove him here  At this time patient will receive Toradol, Valium and ambulatory referral to the spine center for repeat injections if necessary  Prior to Admission Medications   Prescriptions Last Dose Informant Patient Reported? Taking?    Ascorbic Acid (VITAMIN C) 1000 MG tablet  Self Yes No   Sig: Take 1,000 mg by mouth daily   BD Insulin Syringe U/F 31G X 5/16" 0 3 ML MISC   Yes No   Sig: USE DAILY USE 1 TIME DAILY AS DIRECTED   [employer] Microlet Lancets lancets  Self No No   Sig: Use as instructed   Patient taking differently: Check sugars daily    Blood Glucose Monitoring Suppl ([employer] CONTOUR MONITOR) TRACEY  Self No No   Sig: by Does not apply route daily   Cholecalciferol (VITAMIN D3) 361902 UNIT/GM POWD  Self Yes No   Sig: Use 1 capsule daily    Contour Next Test test strip   No No   Sig: CHECK BLOOD GLUCOSE TWICE A DAY   Dapagliflozin Propanediol (Farxiga) 10 MG TABS   No No   Sig: Take 1 tablet (10 mg total) by mouth daily   atorvastatin (LIPITOR) 20 mg tablet   No No   Sig: Take 1 tablet (20 mg total) by mouth daily   glimepiride (AMARYL) 4 mg tablet   No No   Sig: TAKE 2 TABLETS DAILY   metoprolol succinate (TOPROL-XL) 100 mg 24 hr tablet   No No   Sig: Take 0 5 tablets (50 mg total) by mouth daily      Facility-Administered Medications: None       Past Medical History:   Diagnosis Date    Diabetes mellitus ([address] )     Hyperlipidemia     Hypertension     Kidney stones        Past Surgical History:   Procedure Laterality Date    CHOLECYSTECTOMY      EPIDURAL BLOCK INJECTION Left 6/3/2021    Procedure: Left L3-4 transforaminal epidural steroid injection;  Surgeon: Stephen Gallegos MD;  Location: [facility];  Service: Pain Management     EPIDURAL BLOCK INJECTION Left 9/16/2021    Procedure: L3-4 TRANSFORAMINAL EPIDURAL STERIOD INJECTION;  Surgeon: Stephen Gallegos MD;  Location: [facility];  Service: Pain Management     FL GUIDED NEEDLE PLAC BX/ASP/INJ  6/3/2021    FL GUIDED NEEDLE PLAC BX/ASP/INJ  9/16/2021    NECK SURGERY         Family History   Problem Relation Age of Onset    Hypertension Mother     Diabetes Mother     Breast cancer Mother     Diabetes Father     Diabetes Sister     Fibromyalgia Sister      I have reviewed and agree with the history as documented  E-Cigarette/Vaping    E-Cigarette Use Never User      E-Cigarette/Vaping Substances     Social History     Tobacco Use    Smoking status: Never Smoker    Smokeless tobacco: Never Used   Vaping Use    Vaping Use: Never used   Substance Use Topics    Alcohol use: Never    Drug use: Never       Review of Systems   Constitutional: Negative for chills, diaphoresis, fatigue and fever  HENT: Negative for congestion, ear pain, nosebleeds and sore throat  Eyes: Negative for photophobia, pain, discharge and visual disturbance     Respiratory: Negative for cough, choking, chest tightness, shortness of breath and wheezing  Cardiovascular: Negative for chest pain and palpitations  Gastrointestinal: Negative for abdominal distention, abdominal pain, diarrhea and vomiting  Genitourinary: Negative for dysuria, flank pain, frequency and hematuria  Musculoskeletal: Positive for back pain  Negative for arthralgias, gait problem and joint swelling  Skin: Negative for color change and rash  Neurological: Negative for dizziness, seizures, syncope and headaches  Psychiatric/Behavioral: Negative for behavioral problems and confusion  The patient is not nervous/anxious  All other systems reviewed and are negative  Physical Exam  Physical Exam  Vitals and nursing note reviewed  Constitutional:       General: He is not in acute distress  Appearance: He is well-developed  He is not ill-appearing, toxic-appearing or diaphoretic  HENT:      Head: Normocephalic and atraumatic  Right Ear: Tympanic membrane, ear canal and external ear normal       Left Ear: Tympanic membrane, ear canal and external ear normal       Nose: Nose normal       Mouth/Throat:      Mouth: Mucous membranes are moist       Pharynx: Oropharynx is clear  No oropharyngeal exudate or posterior oropharyngeal erythema  Eyes:      General: No scleral icterus  Right eye: No discharge  Left eye: No discharge  Conjunctiva/sclera: Conjunctivae normal       Pupils: Pupils are equal, round, and reactive to light  Cardiovascular:      Rate and Rhythm: Normal rate and regular rhythm  Pulmonary:      Effort: Pulmonary effort is normal       Breath sounds: Normal breath sounds  Abdominal:      General: Bowel sounds are normal       Palpations: Abdomen is soft  Tenderness: There is no abdominal tenderness  Musculoskeletal:      Cervical back: Normal range of motion and neck supple  Back:       Right lower leg: No edema  Left lower leg: No edema  Skin:     General: Skin is warm        Capillary Refill: Capillary refill takes less than 2 seconds  Neurological:      General: No focal deficit present  Mental Status: He is alert and oriented to person, place, and time  Mental status is at baseline     Psychiatric:         Mood and Affect: Mood normal          Behavior: Behavior normal          Vital Signs  ED Triage Vitals [08/22/22 1229]   Temperature Pulse Respirations Blood Pressure SpO2   97 6 °F (36 4 °C) 85 16 (!) 178/87 95 %      Temp Source Heart Rate Source Patient Position - Orthostatic VS BP Location FiO2 (%)   Temporal Monitor -- -- --      Pain Score       6           Vitals:    08/22/22 1229   BP: (!) 178/87   Pulse: 85         Visual Acuity      ED Medications  Medications   ketorolac (TORADOL) injection 15 mg (15 mg Intramuscular Given 8/22/22 1353)   diazepam (VALIUM) injection 5 mg (5 mg Intramuscular Given 8/22/22 1351)       Diagnostic Studies  Results Reviewed     Procedure Component Value Units Date/Time    CBC and differential [[id number]]  (Abnormal) Collected: 08/22/22 1350    Lab Status: Final result Specimen: Blood from Arm, Left Updated: 08/22/22 1424     WBC 3 60 Thousand/uL      RBC 5 93 Million/uL      Hemoglobin 16 8 g/dL      Hematocrit 52 8 %      MCV 89 fL      MCH 28 3 pg      MCHC 31 8 g/dL      RDW 13 9 %      MPV 11 2 fL      Platelets 55 Thousands/uL      nRBC 0 /100 WBCs      Neutrophils Relative 60 %      Immat GRANS % 0 %      Lymphocytes Relative 22 %      Monocytes Relative 16 %      Eosinophils Relative 1 %      Basophils Relative 1 %      Neutrophils Absolute 2 17 Thousands/µL      Immature Grans Absolute 0 00 Thousand/uL      Lymphocytes Absolute 0 78 Thousands/µL      Monocytes Absolute 0 58 Thousand/µL      Eosinophils Absolute 0 04 Thousand/µL      Basophils Absolute 0 03 Thousands/µL     Comprehensive metabolic panel [[id number]]  (Abnormal) Collected: 08/22/22 1350    Lab Status: Final result Specimen: Blood from Arm, Left Updated: 08/22/22 1421     Sodium 141 mmol/L      Potassium 3 6 mmol/L      Chloride 104 mmol/L      CO2 30 mmol/L      ANION GAP 7 mmol/L      BUN 12 mg/dL      Creatinine 0 91 mg/dL      Glucose 152 mg/dL      Calcium 8 8 mg/dL      AST 61 U/L      ALT 71 U/L      Alkaline Phosphatase 96 U/L      Total Protein 7 5 g/dL      Albumin 3 5 g/dL      Total Bilirubin 1 24 mg/dL      eGFR 97 ml/min/1 73sq m     Narrative:      National Kidney Disease Foundation guidelines for Chronic Kidney Disease (CKD):     Stage 1 with normal or high GFR (GFR > 90 mL/min/1 73 square meters)    Stage 2 Mild CKD (GFR = 60-89 mL/min/1 73 square meters)    Stage 3A Moderate CKD (GFR = 45-59 mL/min/1 73 square meters)    Stage 3B Moderate CKD (GFR = 30-44 mL/min/1 73 square meters)    Stage 4 Severe CKD (GFR = 15-29 mL/min/1 73 square meters)    Stage 5 End Stage CKD (GFR <15 mL/min/1 73 square meters)  Note: GFR calculation is accurate only with a steady state creatinine                 No orders to display              Procedures  Procedures         ED Course                                             MDM    Disposition  Final diagnoses:   Chronic bilateral low back pain without sciatica - Acute exacerbation   Lower back pain   Hyperbilirubinemia     Time reflects when diagnosis was documented in both MDM as applicable and the Disposition within this note     Time User Action Codes Description Comment    8/22/2022  1:23 PM Delio Winters Add [M54 50,  G89 29] Chronic bilateral low back pain without sciatica     8/22/2022  2:22 PM DinTwan davidson Add [M54 50] Acute low back pain     8/22/2022  2:22 PM Twan Sawyer Remove [M54 50] Acute low back pain     8/22/2022  2:22 PM DinTwan davidson Add [M54 50] Lower back pain     8/22/2022  2:22 PM Twan Sawyer Modify [M54 50,  G89 29] Chronic bilateral low back pain without sciatica Acute exacerbation    8/22/2022  2:23 PM [place], [address] Add [E80 6] Hyperbilirubinemia       ED Disposition     ED Disposition   Discharge    Condition   Stable    Date/Time   Mon Aug 22, 2022  2:22 PM    Comment   Dayne Tobar [facility] discharge to home/self care                 Follow-up Information     Follow up With Specialties Details Why Contact Info Additional Information    Grayson Rodriguez DO Internal Medicine Schedule an appointment as soon as possible for a visit   [address]  [address]  [facility] [address] Pain Medicine Schedule an appointment as soon as possible for a visit   [address] [facility] [address]  [address] [facility], [address] [place] [address], [place], [place], [address], [phone]          Discharge Medication List as of 8/22/2022  2:38 PM      START taking these medications    Details   cyclobenzaprine (FLEXERIL) 10 mg tablet Take 1 tablet (10 mg total) by mouth 3 (three) times a day as needed for muscle spasms, Starting Mon 8/22/2022, Normal      meloxicam (Mobic) 7 5 mg tablet Take 1 tablet (7 5 mg total) by mouth daily, Starting Mon 8/22/2022, Normal         CONTINUE these medications which have NOT CHANGED    Details   metoprolol succinate (TOPROL-XL) 100 mg 24 hr tablet Take 0 5 tablets (50 mg total) by mouth daily, Starting Thu 7/21/2022, Normal      Ascorbic Acid (VITAMIN C) 1000 MG tablet Take 1,000 mg by mouth daily, Historical Med      atorvastatin (LIPITOR) 20 mg tablet Take 1 tablet (20 mg total) by mouth daily, Starting Thu 3/17/2022, Normal      [employer] Microlet Lancets lancets Use as instructed, Normal      BD Insulin Syringe U/F 31G X 5/16" 0 3 ML MISC USE DAILY USE 1 TIME DAILY AS DIRECTED, Historical Med      Blood Glucose Monitoring Suppl ([employer] CONTOUR MONITOR) Bandar Hernández by Does not apply route daily, Starting Thu 7/30/2020, Normal      Cholecalciferol (VITAMIN D3) 351343 UNIT/GM POWD Use 1 capsule daily , Historical Med      Contour Next Test test strip CHECK BLOOD GLUCOSE TWICE A DAY, Normal      Dapagliflozin Propanediol (Farxiga) 10 MG TABS Take 1 tablet (10 mg total) by mouth daily, Starting Thu 3/17/2022, Normal      glimepiride (AMARYL) 4 mg tablet TAKE 2 TABLETS DAILY, Normal                 PDMP Review       Value Time User    PDMP Reviewed  Yes 3/13/2020 10:08 AM Lolita Espitia PA-C          ED Provider  Electronically Signed by           Antony Lugo PA-C  08/22/22 2617

## 2022-08-22 NOTE — Clinical Note
Nagi Olivares was seen and treated in our emergency department on 8/22/2022  Diagnosis: low back pain    Robe    He may return on this date: 08/25/2022         If you have any questions or concerns, please don't hesitate to call        Lashaun Taylor PA-C    ______________________________           _______________          _______________  Hospital Representative                              Date                                Time

## 2022-08-22 NOTE — Clinical Note
Robby Gonzáles was seen and treated in our emergency department on 8/22/2022  Diagnosis: low back pain    [place]obe    He may return on this date: 08/25/2022         If you have any questions or concerns, please don't hesitate to call        Wong Mistry PA-C    ______________________________           _______________          _______________  Hospital Representative                              Date                                Time

## 2022-08-22 NOTE — DISCHARGE INSTRUCTIONS
Your bilirubin is slightly elevated follow-up with your family doctor for further evaluation without fell    Call today for an appointment this week    Follow-up with pain management doctor listed I did place an ambulatory referral    Return with any worsening symptoms questions comments or concerns

## 2022-08-23 ENCOUNTER — TELEPHONE (OUTPATIENT)
Dept: PHYSICAL THERAPY | Facility: OTHER | Age: 52
End: 2022-08-23

## 2022-08-23 NOTE — TELEPHONE ENCOUNTER
Message left for patient regarding referral entered for [facility]      Contact information, hours of operation and VM instructions left  Nurse requested patient to CB if needed and leave Full Name &  on VM       Referral deferred for f/u attempt per protocol    Note: Pt scheduled with [facility]

## 2022-08-25 ENCOUNTER — OFFICE VISIT (OUTPATIENT)
Dept: PAIN MEDICINE | Facility: CLINIC | Age: 52
End: 2022-08-25
Payer: COMMERCIAL

## 2022-08-25 VITALS
SYSTOLIC BLOOD PRESSURE: 138 MMHG | WEIGHT: 260 LBS | DIASTOLIC BLOOD PRESSURE: 85 MMHG | HEART RATE: 103 BPM | HEIGHT: 72 IN | BODY MASS INDEX: 35.21 KG/M2

## 2022-08-25 DIAGNOSIS — M79.18 MYOFASCIAL PAIN SYNDROME: ICD-10-CM

## 2022-08-25 DIAGNOSIS — M54.16 LUMBAR RADICULOPATHY: ICD-10-CM

## 2022-08-25 DIAGNOSIS — G89.29 CHRONIC BILATERAL LOW BACK PAIN WITHOUT SCIATICA: Primary | ICD-10-CM

## 2022-08-25 DIAGNOSIS — M54.50 CHRONIC BILATERAL LOW BACK PAIN WITHOUT SCIATICA: Primary | ICD-10-CM

## 2022-08-25 PROCEDURE — 3075F SYST BP GE 130 - 139MM HG: CPT | Performed by: NURSE PRACTITIONER

## 2022-08-25 PROCEDURE — 99214 OFFICE O/P EST MOD 30 MIN: CPT | Performed by: NURSE PRACTITIONER

## 2022-08-25 PROCEDURE — 3079F DIAST BP 80-89 MM HG: CPT | Performed by: NURSE PRACTITIONER

## 2022-08-25 RX ORDER — PREGABALIN 50 MG/1
CAPSULE ORAL
Qty: 90 CAPSULE | Refills: 1 | Status: SHIPPED | OUTPATIENT
Start: 2022-08-25

## 2022-08-25 NOTE — PATIENT INSTRUCTIONS
Pregabalin (By mouth)   Pregabalin (pre-GA-ba-rene)  Treats nerve and muscle pain, including fibromyalgia  Also treats partial-onset seizures  Brand Name(s): Lyrica, Lyrica CR   There may be other brand names for this medicine  When This Medicine Should Not Be Used: This medicine is not right for everyone  Do not use it if you had an allergic reaction to pregabalin  How to Use This Medicine:   Capsule, Liquid, Long Acting Tablet  Take your medicine as directed  Your dose may need to be changed several times to find what works best for you  Extended-release tablet: Swallow the extended-release tablet whole  Do not crush, break, or chew it  Take it after an evening meal   Oral liquid: Measure the oral liquid medicine with a marked measuring spoon, oral syringe, or medicine cup  This medicine should come with a Medication Guide  Ask your pharmacist for a copy if you do not have one  Missed dose: Take a dose as soon as you remember  If it is almost time for your next dose, wait until then and take a regular dose  Do not take extra medicine to make up for a missed dose  If you miss a dose of the extended-release tablet after your evening meal, take it before bedtime after a snack  If you miss the dose before bedtime, take it after your morning meal  If you do not take the dose the following morning, then take the next dose at your regular time after your evening meal  Do not take 2 doses at the same time  Store the medicine in a closed container at room temperature, away from heat, moisture, and direct light  Drugs and Foods to Avoid:   Ask your doctor or pharmacist before using any other medicine, including over-the-counter medicines, vitamins, and herbal products  Some medicines can affect how pregabalin works   Tell your doctor if you are using any of the following:   ACE inhibitor (including benazepril, enalapril, lisinopril, quinapril, ramipril)  Oral diabetes medicine (including metformin, pioglitazone, rosiglitazone)  Do not drink alcohol while you are using this medicine  Tell your doctor if you use anything else that makes you sleepy  Some examples are allergy medicine, narcotic pain medicine, and alcohol  Tell your doctor if you are also using oxycodone, lorazepam, or zolpidem  Warnings While Using This Medicine:   Tell your doctor if you are pregnant or breastfeeding, or if you have kidney disease, heart failure, heart rhythm problems, lung or breathing problems, a bleeding disorder, diabetes, sores or skin problems, or a low blood platelet count  Tell your doctor if you have a history of angioedema (severe swelling), alcohol or drug abuse, depression, or other mood problems  This medicine may cause the following problems:   Angioedema (severe swelling), which may be life-threatening  Changes in mood or behavior, including suicidal thoughts or behavior  Respiratory depression (serious breathing problem that can be life-threatening), when used with narcotic pain medicines  Peripheral edema (swelling of your hands, ankles, feet, or lower legs)  Increased risk for cancer and bleeding  Serious muscle problems  Heart rhythm changes  This medicine may make you dizzy or drowsy  It may also cause blurry or double vision  Do not drive or do anything else that could be dangerous until you know how this medicine affects you  Do not stop using this medicine suddenly  Your doctor will need to slowly decrease your dose before you stop it completely  Your doctor will do lab tests at regular visits to check on the effects of this medicine  Keep all appointments  Keep all medicine out of the reach of children  Never share your medicine with anyone  Possible Side Effects While Using This Medicine:   Call your doctor right away if you notice any of these side effects:   Allergic reaction: Itching or hives, swelling in your face or hands, swelling or tingling in your mouth or throat, chest tightness, trouble breathing  Blistering, peeling, red skin rash  Blue lips, fingernails, or skin, trouble breathing, chest pain  Blurry or double vision  Fever, chills, cough, sore throat, body aches  Muscle pain, tenderness, or weakness, general feeling of illness  Rapid weight gain, swelling in your hands, ankles, or feet  Severe dizziness or drowsiness  Sudden mood changes, unusual moods or behavior, including extreme happiness or depression, thoughts or attempts of killing oneself  Swelling in your throat, head, or neck  Uneven heartbeat  Unusual bleeding, bruising, or weakness  If you notice these less serious side effects, talk with your doctor:   Confusion, trouble concentrating  Constipation  Dry mouth  If you notice other side effects that you think are caused by this medicine, tell your doctor  Call your doctor for medical advice about side effects  You may report side effects to FDA at 0-752-FDA-6192    © Copyright [employer] 2022 Information is for End User's use only and may not be sold, redistributed or otherwise used for commercial purposes  The above information is an  only  It is not intended as medical advice for individual conditions or treatments  Talk to your doctor, nurse or pharmacist before following any medical regimen to see if it is safe and effective for you

## 2022-08-25 NOTE — H&P (VIEW-ONLY)
Assessment:  1  Chronic bilateral low back pain without sciatica    2  Lumbar radiculopathy    3  Myofascial pain syndrome        Plan:  While the patient was in the office today, I did have a thorough conversation regarding their chronic pain syndrome, medication management, and treatment plan options  Patient is being seen for a follow-up visit  He was last seen here on 10/20/2021 following a left-sided L3-4 transforaminal epidural steroid injection that was performed on 09/16/2021  Patient states that his pain was up to 75% improved until last week  Pain increased without inciting event  Pain was so bad that he went to the emergency room 3 days ago  He was treated with a Toradol injection and sent home on Mobic and Flexeril  He denies any improvement  Patient previously participated in a course of physical therapy from 03/05/2021 until 05/07/2021  Patient continues to do lumbar core strengthening/stretching exercises at home every day  Lately, there has been no relief  Will plan to repeat the left-sided L3-4 transforaminal epidural steroid injection in the near future  Complete risks and benefits including bleeding, infection, tissue reaction, nerve injury and allergic reaction were discussed  The approach was demonstrated using models and literature was provided  Verbal and written consent was obtained  I discussed with the patient that I feel a medication such as Lyrica would be helpful in treating his pain  I discussed with patient the type of medication it is, however works, and that it requires a titration process that is specific to each individual   I reviewed with the patient that it may take 3-4 weeks for the medications side effects to be noticed and it should never be abruptly stop  Possible side effects include, but are not limited to:  Vertigo, lethargy, nausea, and edema of the extremities  Advised the patient to call our office if he experience any side effects    The patient verbalized an understanding  A prescription for Lyrica titrating to 50 mg 3 times daily was sent to his pharmacy  Patient previously tried gabapentin which was discontinued due to rash  Follow-up 1 month after the injection  History of Present Illness: The patient is a 50 y o  male who presents for a follow up office visit in regards to Back Pain, Leg Pain, and Foot Pain  The patients current symptoms include complaints of low back pain that radiates to the left lateral and anterior thigh  Current pain level is a 9/10  Quality pain is described as burning, sharp, pressure-like, pins and needles  Current pain medications includes:  Mobic and Flexeril as prescribed by ER provider    The patient reports that this regimen is providing 0 % pain relief  The patient is reporting no side effects from this pain medication regimen  I have personally reviewed and/or updated the patient's past medical history, past surgical history, family history, social history, current medications, allergies, and vital signs today  Review of Systems  Review of Systems   Constitutional: Negative for chills and fever  HENT: Negative for ear pain and sore throat  Eyes: Negative for pain and visual disturbance  Respiratory: Negative for cough and shortness of breath  Cardiovascular: Negative for chest pain and palpitations  Gastrointestinal: Negative for abdominal pain and vomiting  Genitourinary: Negative for dysuria and hematuria  Musculoskeletal: Positive for gait problem  Negative for arthralgias and back pain  Decreased range of motion  Pain in extremity- left leg      Skin: Negative for color change and rash  Neurological: Negative for seizures and syncope  All other systems reviewed and are negative          Past Medical History:   Diagnosis Date    Diabetes mellitus ([address] )     Hyperlipidemia     Hypertension     Kidney stones        Past Surgical History:   Procedure Laterality Date    CHOLECYSTECTOMY      EPIDURAL BLOCK INJECTION Left 6/3/2021    Procedure: Left L3-4 transforaminal epidural steroid injection;  Surgeon: Adri Oliver MD;  [facility]: [facility];  Service: Pain Management     EPIDURAL BLOCK INJECTION Left 9/16/2021    Procedure: L3-4 TRANSFORAMINAL EPIDURAL STERIOD INJECTION;  Surgeon: Adri Oliver MD;  Location: [facility];  Service: Pain Management     FL GUIDED NEEDLE PLAC BX/ASP/INJ  6/3/2021    FL GUIDED NEEDLE PLAC BX/ASP/INJ  9/16/2021    NECK SURGERY         Family History   Problem Relation Age of Onset    Hypertension Mother     Diabetes Mother    [facility] Breast cancer Mother    [facility] Diabetes Father     Diabetes Sister     Fibromyalgia Sister        Social History     Occupational History    Occupation: EMPLOYED   Tobacco Use    Smoking status: Never Smoker    Smokeless tobacco: Never Used   Vaping Use    Vaping Use: Never used   Substance and Sexual Activity    Alcohol use: Never    Drug use: Never    Sexual activity: Not Currently         Current Outpatient Medications:     Ascorbic Acid (VITAMIN C) 1000 MG tablet, Take 1,000 mg by mouth daily, Disp: , Rfl:     atorvastatin (LIPITOR) 20 mg tablet, Take 1 tablet (20 mg total) by mouth daily, Disp: 90 tablet, Rfl: 3    niiu Microlet Lancets lancets, Use as instructed (Patient taking differently: Check sugars daily), Disp: 100 each, Rfl: 1    BD Insulin Syringe U/F 31G X 5/16" 0 3 ML MISC, USE DAILY USE 1 TIME DAILY AS DIRECTED, Disp: , Rfl:     Blood Glucose Monitoring Suppl (Fwd: Power CONTOUR MONITOR) TRACEY, by Does not apply route daily, Disp: 1 Device, Rfl: 0    Cholecalciferol (VITAMIN D3) 259756 UNIT/GM POWD, Use 1 capsule daily , Disp: , Rfl:     Contour Next Test test strip, CHECK BLOOD GLUCOSE TWICE A DAY, Disp: 100 strip, Rfl: 1    cyclobenzaprine (FLEXERIL) 10 mg tablet, Take 1 tablet (10 mg total) by mouth 3 (three) times a day as needed for muscle spasms, Disp: 9 tablet, Rfl: 0    Dapagliflozin Propanediol (Farxiga) 10 MG TABS, Take 1 tablet (10 mg total) by mouth daily, Disp: 90 tablet, Rfl: 1    glimepiride (AMARYL) 4 mg tablet, TAKE 2 TABLETS DAILY, Disp: 180 tablet, Rfl: 1    meloxicam (Mobic) 7 5 mg tablet, Take 1 tablet (7 5 mg total) by mouth daily, Disp: 5 tablet, Rfl: 0    metoprolol succinate (TOPROL-XL) 100 mg 24 hr tablet, Take 0 5 tablets (50 mg total) by mouth daily, Disp: 45 tablet, Rfl: 3    pregabalin (LYRICA) 50 mg capsule, 1 PO QHS x 1 day, then 1 PO BID x 1 day, then 1 PO TID, Disp: 90 capsule, Rfl: 1    Allergies   Allergen Reactions    Codeine Nausea Only    Lisinopril      swelling    Metformin      GI    Neurontin [Gabapentin] Hives    Penicillins Hives       Physical Exam:    /85   Pulse 103   Ht 6' (1 829 m)   Wt 118 kg (260 lb)   BMI 35 26 kg/m²     Constitutional:normal, well developed, well nourished, alert, in no distress and non-toxic and no overt pain behavior  and overweight  Eyes:anicteric  HEENT:grossly intact  Neck:supple, symmetric, trachea midline and no masses   Pulmonary:even and unlabored  Cardiovascular:No edema or pitting edema present  Skin:Normal without rashes or lesions and well hydrated  Psychiatric:Mood and affect appropriate  Neurologic:Cranial Nerves II-XII grossly intact  Musculoskeletal:ambulates with cane and Gait is slow and guarded  Imaging  No orders to display       No orders of the defined types were placed in this encounter

## 2022-08-25 NOTE — PROGRESS NOTES
Assessment:  1  Chronic bilateral low back pain without sciatica    2  Lumbar radiculopathy    3  Myofascial pain syndrome        Plan:  While the patient was in the office today, I did have a thorough conversation regarding their chronic pain syndrome, medication management, and treatment plan options  Patient is being seen for a follow-up visit  He was last seen here on 10/20/2021 following a left-sided L3-4 transforaminal epidural steroid injection that was performed on 09/16/2021  Patient states that his pain was up to 75% improved until last week  Pain increased without inciting event  Pain was so bad that he went to the emergency room 3 days ago  He was treated with a Toradol injection and sent home on Mobic and Flexeril  He denies any improvement  Patient previously participated in a course of physical therapy from 03/05/2021 until 05/07/2021  Patient continues to do lumbar core strengthening/stretching exercises at home every day  Lately, there has been no relief  Will plan to repeat the left-sided L3-4 transforaminal epidural steroid injection in the near future  Complete risks and benefits including bleeding, infection, tissue reaction, nerve injury and allergic reaction were discussed  The approach was demonstrated using models and literature was provided  Verbal and written consent was obtained  I discussed with the patient that I feel a medication such as Lyrica would be helpful in treating his pain  I discussed with patient the type of medication it is, however works, and that it requires a titration process that is specific to each individual   I reviewed with the patient that it may take 3-4 weeks for the medications side effects to be noticed and it should never be abruptly stop  Possible side effects include, but are not limited to:  Vertigo, lethargy, nausea, and edema of the extremities  Advised the patient to call our office if he experience any side effects    The patient verbalized an understanding  A prescription for Lyrica titrating to 50 mg 3 times daily was sent to his pharmacy  Patient previously tried gabapentin which was discontinued due to rash  Follow-up 1 month after the injection  History of Present Illness: The patient is a 50 y o  male who presents for a follow up office visit in regards to Back Pain, Leg Pain, and Foot Pain  The patients current symptoms include complaints of low back pain that radiates to the left lateral and anterior thigh  Current pain level is a 9/10  Quality pain is described as burning, sharp, pressure-like, pins and needles  Current pain medications includes:  Mobic and Flexeril as prescribed by ER provider    The patient reports that this regimen is providing 0 % pain relief  The patient is reporting no side effects from this pain medication regimen  I have personally reviewed and/or updated the patient's past medical history, past surgical history, family history, social history, current medications, allergies, and vital signs today  Review of Systems  Review of Systems   Constitutional: Negative for chills and fever  HENT: Negative for ear pain and sore throat  Eyes: Negative for pain and visual disturbance  Respiratory: Negative for cough and shortness of breath  Cardiovascular: Negative for chest pain and palpitations  Gastrointestinal: Negative for abdominal pain and vomiting  Genitourinary: Negative for dysuria and hematuria  Musculoskeletal: Positive for gait problem  Negative for arthralgias and back pain  Decreased range of motion  Pain in extremity- left leg      Skin: Negative for color change and rash  Neurological: Negative for seizures and syncope  All other systems reviewed and are negative          Past Medical History:   Diagnosis Date    Diabetes mellitus ([address] )     Hyperlipidemia     Hypertension     Kidney stones        Past Surgical History:   Procedure Laterality Date    CHOLECYSTECTOMY      EPIDURAL BLOCK INJECTION Left 6/3/2021    Procedure: Left L3-4 transforaminal epidural steroid injection;  Surgeon: Catherine Angel MD;  [facility];  Service: Pain Management     EPIDURAL BLOCK INJECTION Left 9/16/2021    Procedure: L3-4 TRANSFORAMINAL EPIDURAL STERIOD INJECTION;  Surgeon: Catherine Angel MD;  [facility];  Service: Pain Management     FL GUIDED NEEDLE PLAC BX/ASP/INJ  6/3/2021    FL GUIDED NEEDLE PLAC BX/ASP/INJ  9/16/2021    NECK SURGERY         Family History   Problem Relation Age of Onset    Hypertension Mother     Diabetes Mother    [de-identified] Breast cancer Mother    [de-identified] Diabetes Father     Diabetes Sister     Fibromyalgia Sister        Social History     Occupational History    Occupation: EMPLOYED   Tobacco Use    Smoking status: Never Smoker    Smokeless tobacco: Never Used   Vaping Use    Vaping Use: Never used   Substance and Sexual Activity    Alcohol use: Never    Drug use: Never    Sexual activity: Not Currently         Current Outpatient Medications:     Ascorbic Acid (VITAMIN C) 1000 MG tablet, Take 1,000 mg by mouth daily, Disp: , Rfl:     atorvastatin (LIPITOR) 20 mg tablet, Take 1 tablet (20 mg total) by mouth daily, Disp: 90 tablet, Rfl: 3    [email or web address] Microlet Lancets lancets, Use as instructed (Patient taking differently: Check sugars daily), Disp: 100 each, Rfl: 1    BD Insulin Syringe U/F 31G X 5/16" 0 3 ML MISC, USE DAILY USE 1 TIME DAILY AS DIRECTED, Disp: , Rfl:     Blood Glucose Monitoring Suppl ([employer] CONTOUR MONITOR) TRACEY, by Does not apply route daily, Disp: 1 Device, Rfl: 0    Cholecalciferol (VITAMIN D3) [id number] UNIT/GM POWD, Use 1 capsule daily , Disp: , Rfl:     Contour Next Test test strip, CHECK BLOOD GLUCOSE TWICE A DAY, Disp: 100 strip, Rfl: 1    cyclobenzaprine (FLEXERIL) 10 mg tablet, Take 1 tablet (10 mg total) by mouth 3 (three) times a day as needed for muscle spasms, Disp: 9 tablet, Rfl: 0    Dapagliflozin Propanediol (Farxiga) 10 MG TABS, Take 1 tablet (10 mg total) by mouth daily, Disp: 90 tablet, Rfl: 1    glimepiride (AMARYL) 4 mg tablet, TAKE 2 TABLETS DAILY, Disp: 180 tablet, Rfl: 1    meloxicam (Mobic) 7 5 mg tablet, Take 1 tablet (7 5 mg total) by mouth daily, Disp: 5 tablet, Rfl: 0    metoprolol succinate (TOPROL-XL) 100 mg 24 hr tablet, Take 0 5 tablets (50 mg total) by mouth daily, Disp: 45 tablet, Rfl: 3    pregabalin (LYRICA) 50 mg capsule, 1 PO QHS x 1 day, then 1 PO BID x 1 day, then 1 PO TID, Disp: 90 capsule, Rfl: 1    Allergies   Allergen Reactions    Codeine Nausea Only    Lisinopril      swelling    Metformin      GI    Neurontin [Gabapentin] Hives    Penicillins Hives       Physical Exam:    /85   Pulse 103   Ht 6' (1 829 m)   Wt 118 kg (260 lb)   BMI 35 26 kg/m²     Constitutional:normal, well developed, well nourished, alert, in no distress and non-toxic and no overt pain behavior  and overweight  Eyes:anicteric  HEENT:grossly intact  Neck:supple, symmetric, trachea midline and no masses   Pulmonary:even and unlabored  Cardiovascular:No edema or pitting edema present  Skin:Normal without rashes or lesions and well hydrated  Psychiatric:Mood and affect appropriate  Neurologic:Cranial Nerves II-XII grossly intact  Musculoskeletal:ambulates with cane and Gait is slow and guarded  Imaging  No orders to display       No orders of the defined types were placed in this encounter

## 2022-08-26 ENCOUNTER — OFFICE VISIT (OUTPATIENT)
Dept: INTERNAL MEDICINE CLINIC | Facility: CLINIC | Age: 52
End: 2022-08-26
Payer: COMMERCIAL

## 2022-08-26 VITALS
DIASTOLIC BLOOD PRESSURE: 82 MMHG | SYSTOLIC BLOOD PRESSURE: 148 MMHG | TEMPERATURE: 98 F | HEART RATE: 72 BPM | WEIGHT: 271.5 LBS | BODY MASS INDEX: 36.77 KG/M2 | HEIGHT: 72 IN | OXYGEN SATURATION: 96 %

## 2022-08-26 DIAGNOSIS — Z12.12 ENCOUNTER FOR SCREENING FOR COLORECTAL MALIGNANT NEOPLASM: ICD-10-CM

## 2022-08-26 DIAGNOSIS — Z13.0 SCREENING FOR DEFICIENCY ANEMIA: ICD-10-CM

## 2022-08-26 DIAGNOSIS — E78.2 MIXED HYPERLIPIDEMIA: ICD-10-CM

## 2022-08-26 DIAGNOSIS — Z12.5 SCREENING FOR PROSTATE CANCER: ICD-10-CM

## 2022-08-26 DIAGNOSIS — I10 ESSENTIAL HYPERTENSION: ICD-10-CM

## 2022-08-26 DIAGNOSIS — M47.816 OSTEOARTHRITIS OF LUMBAR SPINE, UNSPECIFIED SPINAL OSTEOARTHRITIS COMPLICATION STATUS: ICD-10-CM

## 2022-08-26 DIAGNOSIS — D69.6 THROMBOCYTOPENIA (HCC): ICD-10-CM

## 2022-08-26 DIAGNOSIS — Z12.11 ENCOUNTER FOR SCREENING FOR COLORECTAL MALIGNANT NEOPLASM: ICD-10-CM

## 2022-08-26 DIAGNOSIS — E11.9 TYPE 2 DIABETES MELLITUS WITHOUT COMPLICATION, WITHOUT LONG-TERM CURRENT USE OF INSULIN (HCC): Primary | ICD-10-CM

## 2022-08-26 DIAGNOSIS — Z13.220 SCREENING FOR LIPID DISORDERS: ICD-10-CM

## 2022-08-26 DIAGNOSIS — Z23 ENCOUNTER FOR VACCINATION: ICD-10-CM

## 2022-08-26 DIAGNOSIS — Z13.29 SCREENING FOR THYROID DISORDER: ICD-10-CM

## 2022-08-26 DIAGNOSIS — G89.29 CHRONIC BILATERAL LOW BACK PAIN WITHOUT SCIATICA: ICD-10-CM

## 2022-08-26 DIAGNOSIS — Z13.1 SCREENING FOR DIABETES MELLITUS (DM): ICD-10-CM

## 2022-08-26 DIAGNOSIS — G89.4 CHRONIC PAIN SYNDROME: ICD-10-CM

## 2022-08-26 DIAGNOSIS — R13.10 DYSPHAGIA, UNSPECIFIED TYPE: ICD-10-CM

## 2022-08-26 DIAGNOSIS — M54.50 CHRONIC BILATERAL LOW BACK PAIN WITHOUT SCIATICA: ICD-10-CM

## 2022-08-26 PROBLEM — R53.81 PHYSICAL DECONDITIONING: Status: RESOLVED | Noted: 2020-07-31 | Resolved: 2022-08-26

## 2022-08-26 LAB — SL AMB POCT HEMOGLOBIN AIC: 7.1 (ref ?–6.5)

## 2022-08-26 PROCEDURE — 83036 HEMOGLOBIN GLYCOSYLATED A1C: CPT | Performed by: INTERNAL MEDICINE

## 2022-08-26 PROCEDURE — 90471 IMMUNIZATION ADMIN: CPT | Performed by: INTERNAL MEDICINE

## 2022-08-26 PROCEDURE — 90686 IIV4 VACC NO PRSV 0.5 ML IM: CPT | Performed by: INTERNAL MEDICINE

## 2022-08-26 PROCEDURE — 99214 OFFICE O/P EST MOD 30 MIN: CPT | Performed by: INTERNAL MEDICINE

## 2022-08-26 PROCEDURE — 3051F HG A1C>EQUAL 7.0%<8.0%: CPT | Performed by: INTERNAL MEDICINE

## 2022-08-26 NOTE — PROGRESS NOTES
Depression Screening and Follow-up Plan: Patient was screened for depression during today's encounter  They screened negative with a PHQ-2 score of 0  Assessment/Plan:  Problem List Items Addressed This Visit        Endocrine    Type 2 diabetes mellitus without complication ([facility] [address] ) - Primary    Relevant Medications    Semaglutide 3 MG TABS    Other Relevant Orders    CBC and differential    Comprehensive metabolic panel    Lipid Panel with Direct LDL reflex    TSH, 3rd generation with Free T4 reflex    Microalbumin / creatinine urine ratio    POCT hemoglobin A1c (Completed)       Cardiovascular and Mediastinum    Essential hypertension    Relevant Medications    Semaglutide 3 MG TABS    Other Relevant Orders    Comprehensive metabolic panel    Microalbumin / creatinine urine ratio       Musculoskeletal and Integument    Osteoarthritis of lumbar spine       Other    Thrombocytopenia (HCC)    Relevant Orders    CBC and differential    Mixed hyperlipidemia    Relevant Orders    Comprehensive metabolic panel    Lipid Panel with Direct LDL reflex    Chronic pain syndrome    Chronic bilateral low back pain without sciatica      Other Visit Diagnoses     Encounter for screening for colorectal malignant neoplasm        Relevant Orders    Cologuard    Screening for prostate cancer        Relevant Orders    PSA, Total Screen    Screening for lipid disorders        Screening for diabetes mellitus (DM)        Screening for thyroid disorder        Screening for deficiency anemia        Dysphagia, unspecified type        Relevant Orders    FL barium swallow ROUTINE esophagus    Encounter for vaccination        Relevant Orders    influenza vaccine, quadrivalent, 0 5 mL, preservative-free           Diagnoses and all orders for this visit:    Type 2 diabetes mellitus without complication, without long-term current use of insulin (HCC)  -     CBC and differential; Future  -     Comprehensive metabolic panel;  Future  -     Lipid Panel with Direct LDL reflex; Future  -     TSH, 3rd generation with Free T4 reflex; Future  -     Microalbumin / creatinine urine ratio  -     POCT hemoglobin A1c  -     Semaglutide 3 MG TABS; Take 1 tablet by mouth every morning    Encounter for screening for colorectal malignant neoplasm  -     Cologuard    Essential hypertension  -     Comprehensive metabolic panel; Future  -     Microalbumin / creatinine urine ratio  -     Semaglutide 3 MG TABS; Take 1 tablet by mouth every morning    Osteoarthritis of lumbar spine, unspecified spinal osteoarthritis complication status    Thrombocytopenia (HCC)  -     CBC and differential; Future    Mixed hyperlipidemia  -     Comprehensive metabolic panel; Future  -     Lipid Panel with Direct LDL reflex; Future    Chronic pain syndrome    Chronic bilateral low back pain without sciatica    Screening for prostate cancer  -     PSA, Total Screen; Future    Screening for lipid disorders    Screening for diabetes mellitus (DM)    Screening for thyroid disorder    Screening for deficiency anemia    Dysphagia, unspecified type  -     FL barium swallow ROUTINE esophagus; Future    Encounter for vaccination  -     influenza vaccine, quadrivalent, 0 5 mL, preservative-free      No problem-specific Assessment & Plan notes found for this encounter  A/P: Doing ok and will check labs  IN office HgA1c was 7 1  Given age, would like him around 6 5 and will add GLP-1  Discussed vaccines up date his flu  Will check a barium swallow for his swallowing    Continue current treatment and RTC three months for routine  Keep f/u with pain management  Subjective:      Patient ID: Guru Record is a 50 y o  male  WM RTC for f/u DM, HTN, etc  Doing ok, but has increase LBP and is seeing pain management  Sugars are less than 140 and no low sugars  Remains as active as his LBP will allow and no falls   Due for labs and labs      The following portions of the patient's history were reviewed and updated as appropriate:   He has a past medical history of Diabetes mellitus ([address] ), Hyperlipidemia, Hypertension, and Kidney stones  ,  does not have any pertinent problems on file  ,   has a past surgical history that includes Neck surgery; Cholecystectomy; Epidural block injection (Left, 6/3/2021); FL guided needle plac bx/asp/inj (6/3/2021); Epidural block injection (Left, 9/16/2021); and FL guided needle plac bx/asp/inj (9/16/2021)  ,  family history includes Breast cancer in his mother; Diabetes in his father, mother, and sister; Fibromyalgia in his sister; Hypertension in his mother  ,   reports that he has never smoked  He has never used smokeless tobacco  He reports that he does not drink alcohol and does not use drugs  ,  is allergic to codeine, lisinopril, metformin, neurontin [gabapentin], and penicillins     Current Outpatient Medications   Medication Sig Dispense Refill    Ascorbic Acid (VITAMIN C) 1000 MG tablet Take 1,000 mg by mouth daily      atorvastatin (LIPITOR) 20 mg tablet Take 1 tablet (20 mg total) by mouth daily 90 tablet 3    Cholecalciferol (VITAMIN D3) 972055 UNIT/GM POWD Use 1 capsule daily       Dapagliflozin Propanediol (Farxiga) 10 MG TABS Take 1 tablet (10 mg total) by mouth daily 90 tablet 1    glimepiride (AMARYL) 4 mg tablet TAKE 2 TABLETS DAILY 180 tablet 1    metoprolol succinate (TOPROL-XL) 100 mg 24 hr tablet Take 0 5 tablets (50 mg total) by mouth daily 45 tablet 3    pregabalin (LYRICA) 50 mg capsule 1 PO QHS x 1 day, then 1 PO BID x 1 day, then 1 PO TID 90 capsule 1    Semaglutide 3 MG TABS Take 1 tablet by mouth every morning 90 tablet 1    Stacey Microlet Lancets lancets Use as instructed (Patient taking differently: Check sugars daily) 100 each 1    BD Insulin Syringe U/F 31G X 5/16" 0 3 ML MISC USE DAILY USE 1 TIME DAILY AS DIRECTED      Blood Glucose Monitoring Suppl ([employer] CONTOUR MONITOR) TRACEY by Does not apply route daily 1 Device 0    Contour Next Test test strip CHECK BLOOD GLUCOSE TWICE A  strip 1     No current facility-administered medications for this visit  Review of Systems   Constitutional: Positive for activity change  Negative for chills, diaphoresis, fatigue and fever  HENT: Positive for trouble swallowing  Eyes: Negative for visual disturbance  Respiratory: Negative for cough, chest tightness, shortness of breath and wheezing  Cardiovascular: Negative for chest pain, palpitations and leg swelling  Gastrointestinal: Negative for abdominal pain, constipation, diarrhea, nausea and vomiting  Endocrine: Negative for cold intolerance and heat intolerance  Genitourinary: Negative for difficulty urinating, dysuria and frequency  Musculoskeletal: Positive for back pain  Negative for arthralgias, gait problem and myalgias  Neurological: Negative for dizziness, seizures, syncope, weakness, light-headedness and headaches  Psychiatric/Behavioral: Negative for confusion, dysphoric mood and sleep disturbance  The patient is not nervous/anxious  PHQ-2/9 Depression Screening    Little interest or pleasure in doing things: 0 - not at all  Feeling down, depressed, or hopeless: 0 - not at all  PHQ-2 Score: 0  PHQ-2 Interpretation: Negative depression screen        Objective:  Vitals:    08/26/22 1320   BP: 148/82   Pulse: 72   Temp: 98 °F (36 7 °C)   SpO2: 96%   Weight: 123 kg (271 lb 8 oz)   Height: 6' (1 829 m)     Body mass index is 36 82 kg/m²  Physical Exam  Vitals and nursing note reviewed  Constitutional:       General: He is not in acute distress  Appearance: Normal appearance  He is not ill-appearing  HENT:      Head: Normocephalic and atraumatic  Mouth/Throat:      Mouth: Mucous membranes are moist    Eyes:      Extraocular Movements: Extraocular movements intact  Conjunctiva/sclera: Conjunctivae normal       Pupils: Pupils are equal, round, and reactive to light     Cardiovascular:      Rate and Rhythm: Normal rate and regular rhythm  Heart sounds: Normal heart sounds  Pulmonary:      Effort: Pulmonary effort is normal  No respiratory distress  Breath sounds: Normal breath sounds  No wheezing or rales  Abdominal:      General: Bowel sounds are normal  There is no distension  Palpations: Abdomen is soft  Tenderness: There is no abdominal tenderness  Musculoskeletal:      Cervical back: Neck supple  No tenderness  Right lower leg: No edema  Left lower leg: No edema  Neurological:      General: No focal deficit present  Mental Status: He is alert and oriented to person, place, and time  Mental status is at baseline  Psychiatric:         Mood and Affect: Mood normal          Behavior: Behavior normal          Thought Content:  Thought content normal          Judgment: Judgment normal

## 2022-09-01 DIAGNOSIS — I10 ESSENTIAL HYPERTENSION: ICD-10-CM

## 2022-09-01 DIAGNOSIS — E11.9 TYPE 2 DIABETES MELLITUS WITHOUT COMPLICATION, WITHOUT LONG-TERM CURRENT USE OF INSULIN (HCC): ICD-10-CM

## 2022-09-01 RX ORDER — ORAL SEMAGLUTIDE 3 MG/1
TABLET ORAL
Qty: 90 TABLET | Refills: 1 | Status: SHIPPED | OUTPATIENT
Start: 2022-09-01

## 2022-09-17 DIAGNOSIS — E11.9 TYPE 2 DIABETES MELLITUS WITHOUT COMPLICATION, WITHOUT LONG-TERM CURRENT USE OF INSULIN (HCC): ICD-10-CM

## 2022-09-17 RX ORDER — DAPAGLIFLOZIN 10 MG/1
TABLET, FILM COATED ORAL
Qty: 30 TABLET | Refills: 5 | Status: SHIPPED | OUTPATIENT
Start: 2022-09-17

## 2022-09-23 ENCOUNTER — APPOINTMENT (OUTPATIENT)
Dept: RADIOLOGY | Facility: HOSPITAL | Age: 52
End: 2022-09-23
Payer: COMMERCIAL

## 2022-09-23 ENCOUNTER — HOSPITAL ENCOUNTER (OUTPATIENT)
Facility: HOSPITAL | Age: 52
Setting detail: OUTPATIENT SURGERY
Discharge: HOME | End: 2022-09-23
Attending: ANESTHESIOLOGY | Admitting: ANESTHESIOLOGY
Payer: COMMERCIAL

## 2022-09-23 VITALS
BODY MASS INDEX: 36.7 KG/M2 | DIASTOLIC BLOOD PRESSURE: 65 MMHG | WEIGHT: 271 LBS | TEMPERATURE: 97.5 F | OXYGEN SATURATION: 93 % | SYSTOLIC BLOOD PRESSURE: 126 MMHG | RESPIRATION RATE: 20 BRPM | HEART RATE: 56 BPM | HEIGHT: 72 IN

## 2022-09-23 DIAGNOSIS — M54.50 CHRONIC BILATERAL LOW BACK PAIN WITHOUT SCIATICA: ICD-10-CM

## 2022-09-23 DIAGNOSIS — M54.16 LUMBAR RADICULOPATHY: ICD-10-CM

## 2022-09-23 DIAGNOSIS — G89.29 CHRONIC BILATERAL LOW BACK PAIN WITHOUT SCIATICA: ICD-10-CM

## 2022-09-23 PROCEDURE — 64483 NJX AA&/STRD TFRM EPI L/S 1: CPT | Performed by: ANESTHESIOLOGY

## 2022-09-23 PROCEDURE — 77003 FLUOROGUIDE FOR SPINE INJECT: CPT

## 2022-09-23 RX ORDER — DEXAMETHASONE SODIUM PHOSPHATE 10 MG/ML
INJECTION, SOLUTION INTRAMUSCULAR; INTRAVENOUS AS NEEDED
Status: DISCONTINUED | OUTPATIENT
Start: 2022-09-23 | End: 2022-09-23 | Stop reason: HOSPADM

## 2022-09-23 RX ORDER — LIDOCAINE HYDROCHLORIDE 10 MG/ML
INJECTION, SOLUTION EPIDURAL; INFILTRATION; INTRACAUDAL; PERINEURAL AS NEEDED
Status: DISCONTINUED | OUTPATIENT
Start: 2022-09-23 | End: 2022-09-23 | Stop reason: HOSPADM

## 2022-09-23 NOTE — INTERVAL H&P NOTE
H&P reviewed  After examining the patient I find no changes in the patients condition since the H&P had been written      Vitals:    09/23/22 1007   BP: 137/82   Pulse: 60   Resp: 18   Temp: (!) 97 3 °F (36 3 °C)   SpO2: 95%

## 2022-09-23 NOTE — OP NOTE
OPERATIVE REPORT  PATIENT NAME: Lopez Nino    :  1970  MRN: [id number]  Pt Location: [facility] 01    SURGERY DATE: 2022    Surgeon(s) and Role:     * Marbella Song MD - Primary    Preop Diagnosis:  Chronic bilateral low back pain without sciatica [M54 50, G89 29]  Lumbar radiculopathy [M54 16]    Post-Op Diagnosis Codes:     * Chronic bilateral low back pain without sciatica [M54 50, G89 29]     * Lumbar radiculopathy [M54 16]    Procedure(s) (LRB):  BLOCK / INJECTION EPIDURAL STEROID LUMBAR  left L3-4 TFESI (Left)    Specimen(s):  * No specimens in log *    Estimated Blood Loss:   Minimal    Drains:  * No LDAs found *    Anesthesia Type:   Local    Operative Indications:  Chronic bilateral low back pain without sciatica [M54 50, G89 29]  Lumbar radiculopathy [M54 16]      Operative Findings:  same    Complications:   None    Procedure and Technique:  Fluoroscopically-guided left L3-4 transforaminal epidural steroid injection under fluoroscopy      After discussing the risks, benefits, and alternatives to the procedure, the patient expressed understanding and wished to proceed  The patient was brought to the fluoroscopy suite and placed in the prone position  A procedural pause was conducted to verify:  correct patient identity, procedure to be performed and as applicable, correct side and site, correct patient position, and availability of implants, special equipment and special requirements  After identifying the left L3 pedicle fluoroscopically with an oblique view, the skin was sterilely prepped and draped in the usual fashion using Chloraprep skin prep  The skin and subcutaneous tissue were anesthetized with 0 5% lidocaine  A 5 inch 22 gauge spinal needle was then advanced under fluoroscopic guidance to the posterior aspect of the left L3-4 neural foramen    Appropriate foraminal depth was determined with a lateral fluoroscopic view, and AP visualization confirmed needle positioning at approximately the 6 oclock position relative to the pedicle  After negative aspiration, 1 mL Omnipaque 300 contrast was injected using live fluoroscopy/digital subtraction angiography, confirming appropriate transforaminal spread without evidence of intravascular or intrathecal uptake  Next, a local anesthetic test dose consisting of 1 mL of 2% lidocaine was injected through the needle  After an appropriate period of observation, a directed neurological exam was performed which revealed no new neurologic deficits  Next, a 1 5 ml solution consisting of 7 5 mg of dexamethasone in sterile saline was injected slowly and incrementally into the epidural space  Following the injection the needle was withdrawn slightly and flushed with lidocaine as it was fully extracted  The patient tolerated the procedure well and there were no apparent complications  The patient did not develop any new neurologic deficits  After appropriate observation, the patient was dismissed from the clinic in good condition under their own power  COMMENTS  The patient received a total steroid dose of 7 5 mg of dexamethasone     I was present for the entire procedure    Patient Disposition:  hemodynamically stable        SIGNATURE: [unfilled]  DATE: September 23, 2022  TIME: 10:44 AM

## 2022-09-23 NOTE — DISCHARGE INSTRUCTIONS
Epidural Steroid Injection   WHAT YOU NEED TO KNOW:   An epidural steroid injection (NADINE) is a procedure to inject steroid medicine into the epidural space  The epidural space is between your spinal cord and vertebrae  Steroids reduce inflammation and fluid buildup in your spine that may be causing pain  You may be given pain medicine along with the steroids  ACTIVITY  Do not drive or operate machinery today  No strenuous activity today - bending, lifting, etc   You may resume normal activites starting tomorrow - start slowly and as tolerated  You may shower today, but no tub baths or hot tubs  You may have numbness for several hours from the local anesthetic  Please use caution and common sense, especially with weight-bearing activities  CARE OF THE INJECTION SITE  If you have soreness or pain, apply ice to the area today (20 minutes on/20 minutes off)  Starting tomorrow, you may use warm, moist heat or ice if needed  You may have an increase or change in your discomfort for 36-48 hours after your treatment  Apply ice and continue with any pain medication you have been prescribed  Notify the [facility] if you have any of the following: redness, drainage, swelling, headache, stiff neck or fever above 100°F     SPECIAL INSTRUCTIONS  Our office will contact you in approximately 7 days for a progress report  MEDICATIONS  Continue to take all routine medications  Our office may have instructed you to hold some medications  As no general anesthesia was used in today's procedure, you should not experience any side effects related to anesthesia  If you are diabetic, the steroids used in today's injection may temporarily increase your blood sugar levels after the first few days after your injection  Please keep a close eye on your sugars and alert the doctor who manages your diabetes if your sugars are significantly high from your baseline or you are symptomatic       If you have a problem specifically related to your procedure, please call our office at [phone]  Problems not related to your procedure should be directed to your primary care physician

## 2022-09-26 ENCOUNTER — HOSPITAL ENCOUNTER (OUTPATIENT)
Dept: RADIOLOGY | Facility: HOSPITAL | Age: 52
Discharge: HOME | End: 2022-09-26
Attending: INTERNAL MEDICINE
Payer: COMMERCIAL

## 2022-09-26 DIAGNOSIS — R13.10 DYSPHAGIA, UNSPECIFIED TYPE: ICD-10-CM

## 2022-09-26 PROCEDURE — 74220 X-RAY XM ESOPHAGUS 1CNTRST: CPT

## 2022-09-27 ENCOUNTER — VBI (OUTPATIENT)
Dept: ADMINISTRATIVE | Facility: OTHER | Age: 52
End: 2022-09-27

## 2022-09-30 ENCOUNTER — TELEPHONE (OUTPATIENT)
Dept: PAIN MEDICINE | Facility: CLINIC | Age: 52
End: 2022-09-30

## 2022-10-03 NOTE — TELEPHONE ENCOUNTER
Pt called in with 40 % improvement and pain level 3/10 2 wk post inj.        Please be advised   Pt can be reached @ [phone]

## 2022-10-31 ENCOUNTER — OFFICE VISIT (OUTPATIENT)
Dept: PAIN MEDICINE | Facility: CLINIC | Age: 52
End: 2022-10-31

## 2022-10-31 VITALS
WEIGHT: 275 LBS | HEART RATE: 61 BPM | DIASTOLIC BLOOD PRESSURE: 83 MMHG | HEIGHT: 72 IN | SYSTOLIC BLOOD PRESSURE: 139 MMHG | BODY MASS INDEX: 37.25 KG/M2

## 2022-10-31 DIAGNOSIS — E11.9 TYPE 2 DIABETES MELLITUS WITHOUT COMPLICATION, WITHOUT LONG-TERM CURRENT USE OF INSULIN (HCC): ICD-10-CM

## 2022-10-31 DIAGNOSIS — G89.29 CHRONIC BILATERAL LOW BACK PAIN WITHOUT SCIATICA: ICD-10-CM

## 2022-10-31 DIAGNOSIS — Z78.9 DRUG INTOLERANCE: ICD-10-CM

## 2022-10-31 DIAGNOSIS — M54.16 LUMBAR RADICULOPATHY: ICD-10-CM

## 2022-10-31 DIAGNOSIS — G89.4 CHRONIC PAIN SYNDROME: Primary | ICD-10-CM

## 2022-10-31 DIAGNOSIS — I10 ESSENTIAL HYPERTENSION: ICD-10-CM

## 2022-10-31 DIAGNOSIS — M54.50 CHRONIC BILATERAL LOW BACK PAIN WITHOUT SCIATICA: ICD-10-CM

## 2022-10-31 DIAGNOSIS — M79.18 MYOFASCIAL PAIN SYNDROME: ICD-10-CM

## 2022-10-31 RX ORDER — GLIMEPIRIDE 4 MG/1
TABLET ORAL
Qty: 180 TABLET | Refills: 1 | Status: SHIPPED | OUTPATIENT
Start: 2022-10-31

## 2022-10-31 NOTE — PROGRESS NOTES
Assessment:  1  Chronic pain syndrome    2  Chronic bilateral low back pain without sciatica    3  Lumbar radiculopathy    4  Myofascial pain syndrome        Plan:  While the patient was in the office today, I did have a thorough conversation regarding their chronic pain syndrome, medication management, and treatment plan options  Patient is being seen for follow-up visit  He recently underwent a left-sided L3-4 transforaminal epidural steroid injection on 09/23/2022  Overall, patient is reporting 75-80% improvement in his pain  Pain has become very minimal at this time  He was able to discontinue Lyrica because his pain is very minimal      We discussed the importance of a lifelong commitment to daily exercises geared toward lumbar core stretching and strengthening  The patient was agreeable and verbalized an understanding  I discussed with the patient that at this point time he can follow up with our office on an as-needed basis  I did review the patient that if his pain symptoms should change, worsen, and/or if he would experience any new symptoms he would like to be evaluated for, he should give our office a call  The patient was agreeable and verbalized an understanding  History of Present Illness: The patient is a 50 y o  male who presents for a follow up office visit in regards to Hip Pain, Leg Pain, Knee Pain, Foot Pain, and Ankle Pain  The patient’s current symptoms include complaints of low back and left leg pain  Current pain level is a 1/10  Quality pain is described as pressure-like  Current pain medications includes:  None   I have personally reviewed and/or updated the patient's past medical history, past surgical history, family history, social history, current medications, allergies, and vital signs today  Review of Systems  Review of Systems   Respiratory: Negative for shortness of breath  Cardiovascular: Negative for chest pain     Gastrointestinal: Negative for constipation, diarrhea, nausea and vomiting  Musculoskeletal: Positive for gait problem  Negative for arthralgias, joint swelling and myalgias  Decreased range of motion  Pain in extremity - left leg   Neurological: Negative for dizziness, seizures and weakness  All other systems reviewed and are negative          Past Medical History:   Diagnosis Date   • Diabetes mellitus ([facility] [address] )    • Hyperlipidemia    • Hypertension    • Kidney stones        Past Surgical History:   Procedure Laterality Date   • CHOLECYSTECTOMY     • EPIDURAL BLOCK INJECTION Left 6/3/2021    Procedure: Left L3-4 transforaminal epidural steroid injection;  Surgeon: Anum Rivera MD;  Location: [facility];  Service: Pain Management    • EPIDURAL BLOCK INJECTION Left 9/16/2021    Procedure: L3-4 TRANSFORAMINAL EPIDURAL STERIOD INJECTION;  Surgeon: Anum Rivera MD;  Location: [facility];  Service: Pain Management    • EPIDURAL BLOCK INJECTION Left 9/23/2022    Procedure: BLOCK / INJECTION EPIDURAL STEROID LUMBAR  left L3-4 TFESI;  Surgeon: Anum Rivera MD;  Location: [facility];  Service: Pain Management    • FL GUIDED NEEDLE PLAC BX/ASP/INJ  6/3/2021   • FL GUIDED NEEDLE PLAC BX/ASP/INJ  9/16/2021   • NECK SURGERY         Family History   Problem Relation Age of Onset   • Hypertension Mother    • Diabetes Mother    • Breast cancer Mother    • Diabetes Father    • Diabetes Sister    • Fibromyalgia Sister        Social History     Occupational History   • Occupation: EMPLOYED   Tobacco Use   • Smoking status: Never Smoker   • Smokeless tobacco: Never Used   Vaping Use   • Vaping Use: Never used   Substance and Sexual Activity   • Alcohol use: Never   • Drug use: Never   • Sexual activity: Not Currently         Current Outpatient Medications:   •  Ascorbic Acid (VITAMIN C) 1000 MG tablet, Take 1,000 mg by mouth daily, Disp: , Rfl:   •  atorvastatin (LIPITOR) 20 mg tablet, Take 1 tablet (20 mg total) by mouth daily, Disp: 90 tablet, Rfl: 3  •  Stacey Microlet Lancets lancets, Use as instructed (Patient taking differently: Check sugars daily), Disp: 100 each, Rfl: 1  •  BD Insulin Syringe U/F 31G X 5/16" 0 3 ML MISC, USE DAILY USE 1 TIME DAILY AS DIRECTED, Disp: , Rfl:   •  Blood Glucose Monitoring Suppl ([employer] CONTOUR MONITOR) TRACEY, by Does not apply route daily, Disp: 1 Device, Rfl: 0  •  Cholecalciferol (VITAMIN D3) 008464 UNIT/GM POWD, Use 1 capsule daily , Disp: , Rfl:   •  Contour Next Test test strip, CHECK BLOOD GLUCOSE TWICE A DAY, Disp: 100 strip, Rfl: 1  •  Farxiga 10 MG tablet, TAKE 1 TABLET BY MOUTH EVERY DAY, Disp: 30 tablet, Rfl: 5  •  glimepiride (AMARYL) 4 mg tablet, TAKE 2 TABLETS DAILY, Disp: 180 tablet, Rfl: 1  •  metoprolol succinate (TOPROL-XL) 100 mg 24 hr tablet, Take 0 5 tablets (50 mg total) by mouth daily, Disp: 45 tablet, Rfl: 3  •  pregabalin (LYRICA) 50 mg capsule, 1 PO QHS x 1 day, then 1 PO BID x 1 day, then 1 PO TID, Disp: 90 capsule, Rfl: 1  •  Rybelsus 3 MG TABS, TAKE 1 TABLET BY MOUTH EVERY DAY IN THE MORNING (Patient not taking: Reported on 10/31/2022), Disp: 90 tablet, Rfl: 1    Allergies   Allergen Reactions   • Codeine Nausea Only   • Lisinopril      swelling   • Metformin      GI   • Neurontin [Gabapentin] Hives   • Penicillins Hives       Physical Exam:    /83 (BP Location: Left arm, Patient Position: Sitting, Cuff Size: Standard)   Pulse 61   Ht 6' (1 829 m)   Wt 125 kg (275 lb)   BMI 37 30 kg/m²     Constitutional:normal, well developed, well nourished, alert, in no distress and non-toxic and no overt pain behavior    Eyes:anicteric  HEENT:grossly intact  Neck:supple, symmetric, trachea midline and no masses   Pulmonary:even and unlabored  Cardiovascular:No edema or pitting edema present  Skin:Normal without rashes or lesions and well hydrated  Psychiatric:Mood and affect appropriate  Neurologic:Cranial Nerves II-XII grossly intact  Musculoskeletal:normal    Imaging  No orders to display       No orders of the defined types were placed in this encounter

## 2022-11-25 ENCOUNTER — APPOINTMENT (OUTPATIENT)
Dept: LAB | Facility: CLINIC | Age: 52
End: 2022-11-25

## 2022-11-25 ENCOUNTER — OFFICE VISIT (OUTPATIENT)
Dept: INTERNAL MEDICINE CLINIC | Facility: CLINIC | Age: 52
End: 2022-11-25

## 2022-11-25 VITALS
OXYGEN SATURATION: 97 % | HEIGHT: 72 IN | BODY MASS INDEX: 37.28 KG/M2 | WEIGHT: 275.25 LBS | TEMPERATURE: 97 F | SYSTOLIC BLOOD PRESSURE: 130 MMHG | HEART RATE: 67 BPM | DIASTOLIC BLOOD PRESSURE: 72 MMHG

## 2022-11-25 DIAGNOSIS — Z23 ENCOUNTER FOR VACCINATION: ICD-10-CM

## 2022-11-25 DIAGNOSIS — E11.9 TYPE 2 DIABETES MELLITUS WITHOUT COMPLICATION, WITHOUT LONG-TERM CURRENT USE OF INSULIN (HCC): ICD-10-CM

## 2022-11-25 DIAGNOSIS — Z13.220 SCREENING FOR LIPID DISORDERS: ICD-10-CM

## 2022-11-25 DIAGNOSIS — Z13.29 SCREENING FOR THYROID DISORDER: ICD-10-CM

## 2022-11-25 DIAGNOSIS — Z12.5 SCREENING FOR PROSTATE CANCER: ICD-10-CM

## 2022-11-25 DIAGNOSIS — M47.816 OSTEOARTHRITIS OF LUMBAR SPINE, UNSPECIFIED SPINAL OSTEOARTHRITIS COMPLICATION STATUS: ICD-10-CM

## 2022-11-25 DIAGNOSIS — Z13.1 SCREENING FOR DIABETES MELLITUS (DM): ICD-10-CM

## 2022-11-25 DIAGNOSIS — G89.4 CHRONIC PAIN SYNDROME: ICD-10-CM

## 2022-11-25 DIAGNOSIS — D69.6 THROMBOCYTOPENIA (HCC): ICD-10-CM

## 2022-11-25 DIAGNOSIS — I10 ESSENTIAL HYPERTENSION: ICD-10-CM

## 2022-11-25 DIAGNOSIS — Z00.00 WELL ADULT EXAM: ICD-10-CM

## 2022-11-25 DIAGNOSIS — E78.2 MIXED HYPERLIPIDEMIA: ICD-10-CM

## 2022-11-25 DIAGNOSIS — Z12.11 SCREEN FOR COLON CANCER: ICD-10-CM

## 2022-11-25 DIAGNOSIS — Z13.0 SCREENING FOR DEFICIENCY ANEMIA: ICD-10-CM

## 2022-11-25 DIAGNOSIS — Z00.00 WELL ADULT EXAM: Primary | ICD-10-CM

## 2022-11-25 LAB
ALBUMIN SERPL BCP-MCNC: 3.6 G/DL (ref 3.5–5)
ALP SERPL-CCNC: 109 U/L (ref 46–116)
ALT SERPL W P-5'-P-CCNC: 56 U/L (ref 12–78)
ANION GAP SERPL CALCULATED.3IONS-SCNC: 4 MMOL/L (ref 4–13)
AST SERPL W P-5'-P-CCNC: 42 U/L (ref 5–45)
BASOPHILS # BLD AUTO: 0.04 THOUSANDS/ÂΜL (ref 0–0.1)
BASOPHILS NFR BLD AUTO: 1 % (ref 0–1)
BILIRUB SERPL-MCNC: 0.89 MG/DL (ref 0.2–1)
BUN SERPL-MCNC: 18 MG/DL (ref 5–25)
CALCIUM SERPL-MCNC: 9.6 MG/DL (ref 8.3–10.1)
CHLORIDE SERPL-SCNC: 109 MMOL/L (ref 96–108)
CHOLEST SERPL-MCNC: 101 MG/DL
CO2 SERPL-SCNC: 28 MMOL/L (ref 21–32)
CREAT SERPL-MCNC: 1.02 MG/DL (ref 0.6–1.3)
CREAT UR-MCNC: 112 MG/DL
EOSINOPHIL # BLD AUTO: 0.08 THOUSAND/ÂΜL (ref 0–0.61)
EOSINOPHIL NFR BLD AUTO: 2 % (ref 0–6)
ERYTHROCYTE [DISTWIDTH] IN BLOOD BY AUTOMATED COUNT: 14 % (ref 11.6–15.1)
GFR SERPL CREATININE-BSD FRML MDRD: 85 ML/MIN/1.73SQ M
GLUCOSE P FAST SERPL-MCNC: 105 MG/DL (ref 65–99)
HCT VFR BLD AUTO: 54.3 % (ref 36.5–49.3)
HDLC SERPL-MCNC: 47 MG/DL
HGB BLD-MCNC: 16.5 G/DL (ref 12–17)
IMM GRANULOCYTES # BLD AUTO: 0 THOUSAND/UL (ref 0–0.2)
IMM GRANULOCYTES NFR BLD AUTO: 0 % (ref 0–2)
LDLC SERPL CALC-MCNC: 43 MG/DL (ref 0–100)
LYMPHOCYTES # BLD AUTO: 1.47 THOUSANDS/ÂΜL (ref 0.6–4.47)
LYMPHOCYTES NFR BLD AUTO: 31 % (ref 14–44)
MCH RBC QN AUTO: 27.2 PG (ref 26.8–34.3)
MCHC RBC AUTO-ENTMCNC: 30.4 G/DL (ref 31.4–37.4)
MCV RBC AUTO: 90 FL (ref 82–98)
MICROALBUMIN UR-MCNC: 9.8 MG/L (ref 0–20)
MICROALBUMIN/CREAT 24H UR: 9 MG/G CREATININE (ref 0–30)
MONOCYTES # BLD AUTO: 0.66 THOUSAND/ÂΜL (ref 0.17–1.22)
MONOCYTES NFR BLD AUTO: 14 % (ref 4–12)
NEUTROPHILS # BLD AUTO: 2.55 THOUSANDS/ÂΜL (ref 1.85–7.62)
NEUTS SEG NFR BLD AUTO: 52 % (ref 43–75)
NRBC BLD AUTO-RTO: 0 /100 WBCS
PLATELET # BLD AUTO: 68 THOUSANDS/UL (ref 149–390)
PMV BLD AUTO: 12.4 FL (ref 8.9–12.7)
POTASSIUM SERPL-SCNC: 4.6 MMOL/L (ref 3.5–5.3)
PROT SERPL-MCNC: 7.3 G/DL (ref 6.4–8.4)
PSA SERPL-MCNC: 0.6 NG/ML (ref 0–4)
RBC # BLD AUTO: 6.07 MILLION/UL (ref 3.88–5.62)
SL AMB POCT HEMOGLOBIN AIC: 7.4 (ref ?–6.5)
SODIUM SERPL-SCNC: 141 MMOL/L (ref 135–147)
TRIGL SERPL-MCNC: 57 MG/DL
TSH SERPL DL<=0.05 MIU/L-ACNC: 1.4 UIU/ML (ref 0.45–4.5)
WBC # BLD AUTO: 4.8 THOUSAND/UL (ref 4.31–10.16)

## 2022-11-25 NOTE — PROGRESS NOTES
Assessment/Plan:  Problem List Items Addressed This Visit        Endocrine    Type 2 diabetes mellitus without complication (HCC)    Relevant Medications    Semaglutide (Rybelsus) 3 MG TABS    Other Relevant Orders    CBC and differential    Comprehensive metabolic panel    Lipid Panel with Direct LDL reflex    Microalbumin / creatinine urine ratio    POCT hemoglobin A1c (Completed)       Cardiovascular and Mediastinum    Essential hypertension    Relevant Medications    Semaglutide (Rybelsus) 3 MG TABS    Other Relevant Orders    Comprehensive metabolic panel    Microalbumin / creatinine urine ratio       Musculoskeletal and Integument    Osteoarthritis of lumbar spine       Hematopoietic and Hemostatic    Thrombocytopenia (HCC)    Relevant Orders    CBC and differential       Other    Mixed hyperlipidemia    Relevant Orders    Comprehensive metabolic panel    Lipid Panel with Direct LDL reflex    TSH, 3rd generation with Free T4 reflex    Chronic pain syndrome   Other Visit Diagnoses     Well adult exam    -  Primary    Relevant Orders    CBC and differential    Comprehensive metabolic panel    PSA, Total Screen    Lipid Panel with Direct LDL reflex    TSH, 3rd generation with Free T4 reflex    POCT hemoglobin A1c (Completed)    Encounter for vaccination        Relevant Orders    Pneumococcal Conjugate Vaccine 20-valent (Pcv20)    Screen for colon cancer        Screening for lipid disorders        Relevant Orders    Lipid Panel with Direct LDL reflex    Screening for prostate cancer        Relevant Orders    PSA, Total Screen    Screening for diabetes mellitus (DM)        Screening for thyroid disorder        Screening for deficiency anemia               Diagnoses and all orders for this visit:    Well adult exam  -     CBC and differential; Future  -     Comprehensive metabolic panel; Future  -     PSA, Total Screen; Future  -     Lipid Panel with Direct LDL reflex;  Future  -     TSH, 3rd generation with Free T4 reflex; Future  -     POCT hemoglobin A1c    Type 2 diabetes mellitus without complication, without long-term current use of insulin (HCC)  -     CBC and differential; Future  -     Comprehensive metabolic panel; Future  -     Lipid Panel with Direct LDL reflex; Future  -     Microalbumin / creatinine urine ratio  -     POCT hemoglobin A1c  -     Discontinue: Semaglutide (Rybelsus) 3 MG TABS; Take 1 tablet by mouth every morning  -     Semaglutide (Rybelsus) 3 MG TABS; Take 1 tablet by mouth every morning    Essential hypertension  -     Comprehensive metabolic panel; Future  -     Microalbumin / creatinine urine ratio  -     Discontinue: Semaglutide (Rybelsus) 3 MG TABS; Take 1 tablet by mouth every morning  -     Semaglutide (Rybelsus) 3 MG TABS; Take 1 tablet by mouth every morning    Osteoarthritis of lumbar spine, unspecified spinal osteoarthritis complication status    Thrombocytopenia (HCC)  -     CBC and differential; Future    Mixed hyperlipidemia  -     Comprehensive metabolic panel; Future  -     Lipid Panel with Direct LDL reflex; Future  -     TSH, 3rd generation with Free T4 reflex; Future    Chronic pain syndrome    Encounter for vaccination  -     Pneumococcal Conjugate Vaccine 20-valent (Pcv20)    Screen for colon cancer    Screening for lipid disorders  -     Lipid Panel with Direct LDL reflex; Future    Screening for prostate cancer  -     PSA, Total Screen; Future    Screening for diabetes mellitus (DM)    Screening for thyroid disorder    Screening for deficiency anemia      No problem-specific Assessment & Plan notes found for this encounter  A/P: Doing well and co-morbidities are stable  Labs will be ordered  In office HgA1c was worse at 7 4  Will re-try the GLP-1  Discussed vaccines and flu vaccine is up to date but will up date his pneumonia    Reminded about completing his CRC  [place]ry No mental or physical restrictions  No evidence of communicable diseases   Continue current treatment and RTC one year for annual and three months for routine           Subjective:      Patient ID: Madhav Hernandez is a 50 y o  male  WM presents for a well exam  Doing ok and no c/o's  Sugars less than 140 and no low sugar events  Didn't get the GLP-1 from last visit  Chronic pain is manageable and seeing pain management prn  Remains active w/o difficulty and no falls  Denies depression  No suicidal or violent ideations  Not working at this time  No recent travel  No fever, chills, or sweats  No unexplained wt change  Denies CP, SOB, palpitations, edema, orthopnea, or PND  No sz or syncope  No changes in bowel or bladder habits  No trouble swallowing  Appetite and sleep are good  Due to see both a DDS and an eye doctor  No change in PMH, PSH, ALL, OH, SH, or Fhx  Currently due for labs, vaccines, and CRC            The following portions of the patient's history were reviewed and updated as appropriate:   He has a past medical history of Diabetes mellitus ([facility] [address] ), Hyperlipidemia, Hypertension, and Kidney stones  ,  does not have any pertinent problems on file  ,   has a past surgical history that includes Neck surgery; Cholecystectomy; Epidural block injection (Left, 6/3/2021); FL guided needle plac bx/asp/inj (6/3/2021); Epidural block injection (Left, 9/16/2021); FL guided needle plac bx/asp/inj (9/16/2021); and Epidural block injection (Left, 9/23/2022)  ,  family history includes Breast cancer in his mother; Diabetes in his father, mother, and sister; Fibromyalgia in his sister; Hypertension in his mother  ,   reports that he has never smoked  He has never used smokeless tobacco  He reports that he does not drink alcohol and does not use drugs  ,  is allergic to codeine, lisinopril, metformin, neurontin [gabapentin], and penicillins     Current Outpatient Medications   Medication Sig Dispense Refill   • Ascorbic Acid (VITAMIN C) 1000 MG tablet Take 1,000 mg by mouth daily     • atorvastatin (LIPITOR) 20 mg tablet Take 1 tablet (20 mg total) by mouth daily 90 tablet 3   • Cholecalciferol (VITAMIN D3) 842120 UNIT/GM POWD Use 1 capsule daily      • Farxiga 10 MG tablet TAKE 1 TABLET BY MOUTH EVERY DAY 30 tablet 5   • glimepiride (AMARYL) 4 mg tablet TAKE 2 TABLETS BY MOUTH EVERY  tablet 1   • metoprolol succinate (TOPROL-XL) 100 mg 24 hr tablet Take 0 5 tablets (50 mg total) by mouth daily 45 tablet 3   • Semaglutide (Rybelsus) 3 MG TABS Take 1 tablet by mouth every morning 30 tablet 0   • Blood Glucose Monitoring Suppl ([employer] CONTOUR MONITOR) TRACEY by Does not apply route daily 1 Device 0   • Contour Next Test test strip CHECK BLOOD GLUCOSE TWICE A  strip 1     No current facility-administered medications for this visit  Review of Systems   Constitutional: Negative for activity change, chills, diaphoresis, fatigue and fever  HENT: Negative  Eyes: Negative for visual disturbance  Respiratory: Negative for cough, chest tightness, shortness of breath and wheezing  Cardiovascular: Negative for chest pain, palpitations and leg swelling  Gastrointestinal: Negative for abdominal pain, constipation, diarrhea, nausea and vomiting  Endocrine: Negative for cold intolerance and heat intolerance  Genitourinary: Negative for difficulty urinating, dysuria and frequency  Musculoskeletal: Negative for arthralgias, gait problem and myalgias  Neurological: Negative for dizziness, seizures, syncope, weakness, light-headedness and headaches  Psychiatric/Behavioral: Negative for confusion, dysphoric mood and sleep disturbance  The patient is not nervous/anxious  PHQ-2/9 Depression Screening          Objective:  Vitals:    11/25/22 0915   BP: 130/72   Pulse: 67   Temp: (!) 97 °F (36 1 °C)   SpO2: 97%   Weight: 125 kg (275 lb 4 oz)   Height: 6' (1 829 m)     Body mass index is 37 33 kg/m²  Physical Exam  Vitals and nursing note reviewed  Constitutional:       General: He is not in acute distress  Appearance: Normal appearance  He is obese  He is not ill-appearing  HENT:      Head: Normocephalic and atraumatic  Mouth/Throat:      Mouth: Mucous membranes are moist    Eyes:      Extraocular Movements: Extraocular movements intact  Conjunctiva/sclera: Conjunctivae normal       Pupils: Pupils are equal, round, and reactive to light  Neck:      Vascular: No carotid bruit  Cardiovascular:      Rate and Rhythm: Normal rate and regular rhythm  Heart sounds: Normal heart sounds  Pulmonary:      Effort: Pulmonary effort is normal  No respiratory distress  Breath sounds: Normal breath sounds  No wheezing, rhonchi or rales  Abdominal:      General: Bowel sounds are normal  There is no distension  Palpations: Abdomen is soft  Tenderness: There is no abdominal tenderness  Musculoskeletal:      Cervical back: Neck supple  Right lower leg: No edema  Left lower leg: No edema  Neurological:      General: No focal deficit present  Mental Status: He is alert and oriented to person, place, and time  Mental status is at baseline  Psychiatric:         Mood and Affect: Mood normal          Behavior: Behavior normal          Thought Content:  Thought content normal          Judgment: Judgment normal

## 2023-01-09 DIAGNOSIS — E11.9 TYPE 2 DIABETES MELLITUS WITHOUT COMPLICATION, WITHOUT LONG-TERM CURRENT USE OF INSULIN (HCC): ICD-10-CM

## 2023-01-09 RX ORDER — PERPHENAZINE 16 MG/1
TABLET, FILM COATED ORAL
Qty: 50 STRIP | Refills: 3 | Status: SHIPPED | OUTPATIENT
Start: 2023-01-09

## 2023-01-12 DIAGNOSIS — E11.9 TYPE 2 DIABETES MELLITUS WITHOUT COMPLICATION, WITHOUT LONG-TERM CURRENT USE OF INSULIN (HCC): ICD-10-CM

## 2023-01-12 DIAGNOSIS — I10 ESSENTIAL HYPERTENSION: ICD-10-CM

## 2023-02-16 DIAGNOSIS — E11.9 TYPE 2 DIABETES MELLITUS WITHOUT COMPLICATION, WITHOUT LONG-TERM CURRENT USE OF INSULIN (HCC): ICD-10-CM

## 2023-02-16 DIAGNOSIS — I10 ESSENTIAL HYPERTENSION: ICD-10-CM

## 2023-02-16 RX ORDER — ORAL SEMAGLUTIDE 3 MG/1
TABLET ORAL
Qty: 30 TABLET | Refills: 0 | Status: SHIPPED | OUTPATIENT
Start: 2023-02-16

## 2023-02-27 ENCOUNTER — OFFICE VISIT (OUTPATIENT)
Dept: INTERNAL MEDICINE CLINIC | Facility: CLINIC | Age: 53
End: 2023-02-27

## 2023-02-27 VITALS
RESPIRATION RATE: 14 BRPM | DIASTOLIC BLOOD PRESSURE: 72 MMHG | OXYGEN SATURATION: 97 % | WEIGHT: 278.4 LBS | HEIGHT: 72 IN | TEMPERATURE: 97.1 F | SYSTOLIC BLOOD PRESSURE: 130 MMHG | BODY MASS INDEX: 37.71 KG/M2 | HEART RATE: 64 BPM

## 2023-02-27 DIAGNOSIS — I10 ESSENTIAL HYPERTENSION: ICD-10-CM

## 2023-02-27 DIAGNOSIS — D69.6 THROMBOCYTOPENIA (HCC): ICD-10-CM

## 2023-02-27 DIAGNOSIS — E78.2 MIXED HYPERLIPIDEMIA: ICD-10-CM

## 2023-02-27 DIAGNOSIS — E11.9 TYPE 2 DIABETES MELLITUS WITHOUT COMPLICATION, WITHOUT LONG-TERM CURRENT USE OF INSULIN (HCC): Primary | ICD-10-CM

## 2023-02-27 DIAGNOSIS — G89.4 CHRONIC PAIN SYNDROME: ICD-10-CM

## 2023-02-27 DIAGNOSIS — E66.9 OBESITY (BMI 30-39.9): ICD-10-CM

## 2023-02-27 DIAGNOSIS — E11.9 ENCOUNTER FOR DIABETIC FOOT EXAM (HCC): ICD-10-CM

## 2023-02-27 LAB — SL AMB POCT HEMOGLOBIN AIC: 7.6 (ref ?–6.5)

## 2023-02-27 NOTE — PROGRESS NOTES
BMI Counseling: Body mass index is 37 76 kg/m²  The BMI is above normal  Nutrition recommendations include decreasing portion sizes and encouraging healthy choices of fruits and vegetables  Exercise recommendations include moderate physical activity 150 minutes/week  No pharmacotherapy was ordered  Rationale for BMI follow-up plan is due to patient being overweight or obese  Assessment/Plan:  Problem List Items Addressed This Visit        Endocrine    Type 2 diabetes mellitus without complication ([place] [id number] ) - Primary    Relevant Medications    semaglutide (Rybelsus) 7 MG tablet    Other Relevant Orders    POCT hemoglobin A1c (Completed)       Cardiovascular and Mediastinum    Essential hypertension    Relevant Medications    semaglutide (Rybelsus) 7 MG tablet       Hematopoietic and Hemostatic    Thrombocytopenia (HCC)       Other    Mixed hyperlipidemia    Chronic pain syndrome   Other Visit Diagnoses     Encounter for diabetic foot exam ([place] [id number] )        Obesity (BMI 30-39  9)        Relevant Medications    semaglutide (Rybelsus) 7 MG tablet           Diagnoses and all orders for this visit:    Type 2 diabetes mellitus without complication, without long-term current use of insulin (HCC)  -     Cancel: POCT hemoglobin A1c  -     POCT hemoglobin A1c  -     semaglutide (Rybelsus) 7 MG tablet; Take 1 tablet (7 mg total) by mouth daily before breakfast    Essential hypertension  -     semaglutide (Rybelsus) 7 MG tablet; Take 1 tablet (7 mg total) by mouth daily before breakfast    Thrombocytopenia (HCC)    Mixed hyperlipidemia    Chronic pain syndrome    Encounter for diabetic foot exam ([place] [address] )    Obesity (BMI 30-39 9)  -     semaglutide (Rybelsus) 7 MG tablet; Take 1 tablet (7 mg total) by mouth daily before breakfast      No problem-specific Assessment & Plan notes found for this encounter  A/P: Doing ok and will check labs  In office HgA1c was up at 7 6  Will increase his GLP-1 for DM, wt, and CV protection    Discussed BMI and will give info on diet and exercise  Continue current treatment and RTC three months for routine  Subjective:      Patient ID: Pierre Villeda is a 50 y o  male  WM RTC for f/u DM, HTN, etc  Doing ok and no new issues  Remains active w/o difficulty and no falls  Sugars less than 175 and no low sugar events  Chronic pain is manageable  Due for labs  The following portions of the patient's history were reviewed and updated as appropriate:   He has a past medical history of Diabetes mellitus ([address] ), Hyperlipidemia, Hypertension, and Kidney stones  ,  does not have any pertinent problems on file  ,   has a past surgical history that includes Neck surgery; Cholecystectomy; Epidural block injection (Left, 6/3/2021); FL guided needle plac bx/asp/inj (6/3/2021); Epidural block injection (Left, 9/16/2021); FL guided needle plac bx/asp/inj (9/16/2021); and Epidural block injection (Left, 9/23/2022)  ,  family history includes Breast cancer in his mother; Diabetes in his father, mother, and sister; Fibromyalgia in his sister; Hypertension in his mother  ,   reports that he has never smoked  He has never used smokeless tobacco  He reports that he does not drink alcohol and does not use drugs  ,  is allergic to codeine, lisinopril, metformin, neurontin [gabapentin], and penicillins     Current Outpatient Medications   Medication Sig Dispense Refill   • Ascorbic Acid (VITAMIN C) 1000 MG tablet Take 1,000 mg by mouth daily     • atorvastatin (LIPITOR) 20 mg tablet Take 1 tablet (20 mg total) by mouth daily 90 tablet 3   • Blood Glucose Monitoring Suppl (IVY CONTOUR MONITOR) TRACEY by Does not apply route daily 1 Device 0   • Cholecalciferol (VITAMIN D3) 969051 UNIT/GM POWD Use 1 capsule daily      • Contour Next Test test strip CHECK BLOOD GLUCOSE TWICE A DAY 50 strip 3   • Farxiga 10 MG tablet TAKE 1 TABLET BY MOUTH EVERY DAY 30 tablet 5   • glimepiride (AMARYL) 4 mg tablet TAKE 2 TABLETS BY MOUTH EVERY  tablet 1   • metoprolol succinate (TOPROL-XL) 100 mg 24 hr tablet Take 0 5 tablets (50 mg total) by mouth daily 45 tablet 3   • semaglutide (Rybelsus) 7 MG tablet Take 1 tablet (7 mg total) by mouth daily before breakfast 90 tablet 1     No current facility-administered medications for this visit  Review of Systems   Constitutional: Negative for activity change, chills, diaphoresis, fatigue and fever  HENT: Negative  Eyes: Negative for visual disturbance  Respiratory: Negative for cough, chest tightness, shortness of breath and wheezing  Cardiovascular: Negative for chest pain, palpitations and leg swelling  Gastrointestinal: Negative for abdominal pain, constipation, diarrhea, nausea and vomiting  Endocrine: Negative for cold intolerance and heat intolerance  Genitourinary: Negative for difficulty urinating, dysuria and frequency  Musculoskeletal: Negative for arthralgias, gait problem and myalgias  Neurological: Negative for dizziness, seizures, syncope, weakness, light-headedness and headaches  Psychiatric/Behavioral: Negative for confusion, dysphoric mood and sleep disturbance  The patient is not nervous/anxious  PHQ-2/9 Depression Screening          Objective:  Vitals:    02/27/23 0920   BP: 130/72   Pulse: 64   Resp: 14   Temp: (!) 97 1 °F (36 2 °C)   SpO2: 97%   Weight: 126 kg (278 lb 6 4 oz)   Height: 6' (1 829 m)     Body mass index is 37 76 kg/m²  Physical Exam  Vitals and nursing note reviewed  Constitutional:       General: He is not in acute distress  Appearance: Normal appearance  He is not ill-appearing  HENT:      Head: Normocephalic and atraumatic  Mouth/Throat:      Mouth: Mucous membranes are moist    Eyes:      Extraocular Movements: Extraocular movements intact  Conjunctiva/sclera: Conjunctivae normal       Pupils: Pupils are equal, round, and reactive to light  Neck:      Vascular: No carotid bruit     Cardiovascular:      Rate and Rhythm: Normal rate and regular rhythm  Pulses: no weak pulses          Dorsalis pedis pulses are 1+ on the right side and 1+ on the left side  Posterior tibial pulses are 1+ on the right side and 1+ on the left side  Heart sounds: Normal heart sounds  No murmur heard  Pulmonary:      Effort: Pulmonary effort is normal  No respiratory distress  Breath sounds: Normal breath sounds  No wheezing, rhonchi or rales  Abdominal:      General: Bowel sounds are normal  There is no distension  Palpations: Abdomen is soft  Tenderness: There is no abdominal tenderness  Musculoskeletal:      Cervical back: Neck supple  Right lower leg: No edema  Left lower leg: No edema  Feet:      Right foot:      Skin integrity: No ulcer, skin breakdown, erythema, warmth, callus or dry skin  Left foot:      Skin integrity: No ulcer, skin breakdown, erythema, warmth, callus or dry skin  Neurological:      General: No focal deficit present  Mental Status: He is alert and oriented to person, place, and time  Mental status is at baseline  Psychiatric:         Mood and Affect: Mood normal          Behavior: Behavior normal          Thought Content: Thought content normal          Judgment: Judgment normal          BMI Counseling: Body mass index is 37 76 kg/m²  The BMI is above normal  Nutrition recommendations include reducing portion sizes, decreasing overall calorie intake, reducing intake of saturated fat and trans fat and reducing intake of cholesterol  Exercise recommendations include moderate aerobic physical activity for 150 minutes/week  Patient's shoes and socks removed  Right Foot/Ankle   Right Foot Inspection  Skin Exam: skin normal and skin intact  No dry skin, no warmth, no callus, no erythema, no maceration, no abnormal color, no pre-ulcer, no ulcer and no callus  Toe Exam: ROM and strength within normal limits   No swelling, no tenderness, erythema and  no right toe deformity    Sensory   Monofilament testing: diminished    Vascular  Capillary refills: < 3 seconds  The right DP pulse is 1+  The right PT pulse is 1+  Left Foot/Ankle  Left Foot Inspection  Skin Exam: skin normal and skin intact  No dry skin, no warmth, no erythema, no maceration, normal color, no pre-ulcer, no ulcer and no callus  Toe Exam: ROM and strength within normal limits  No swelling, no tenderness, no erythema and no left toe deformity  Sensory   Monofilament testing: intact    Vascular  Capillary refills: < 3 seconds  The left DP pulse is 1+  The left PT pulse is 1+       Assign Risk Category  No deformity present  Loss of protective sensation  No weak pulses  Risk: 1

## 2023-02-27 NOTE — PATIENT INSTRUCTIONS
Basic Carbohydrate Counting   AMBULATORY CARE:   Carbohydrate counting  is a way to plan your meals by counting the amount of carbohydrate in foods  Carbohydrates are the sugars, starches, and fiber found in fruit, grains, vegetables, and milk products  Carbohydrates increase your blood sugar levels  Carbohydrate counting can help you eat the right amount of carbohydrate to keep your blood sugar levels under control  What you need to know about planning meals using carbohydrate counting:  • A dietitian or healthcare provider will help you develop a healthy meal plan that works best for you  You will be taught how much carbohydrate to eat or drink for each meal and snack  Your meal plan will be based on your age, weight, usual food intake, and physical activity level  If you have diabetes, it will also include your blood sugar levels and diabetes medicine  Once you know how much carbohydrate you should eat, you can decide what type of food you want to eat  • You will need to know what foods contain carbohydrate and how much they contain  Keep track of the amount of carbohydrate in meals and snacks in order to follow your meal plan  Do not avoid carbohydrates or skip meals  Your blood sugar may fall too low if you do not eat enough carbohydrate or you skip meals  Foods that contain carbohydrate:   • Breads:  Each serving of food listed below contains about 15 g of carbohydrate   ? 1 slice of bread (1 ounce) or 1 flour or corn tortilla (6 inch)    ? ½ of a hamburger bun or ¼ of a large bagel (about 1 ounce)    ? 1 pancake (about 4 inches across and ¼ inch thick)    • Cereals and grains:  Serving sizes of ready-to-eat cereals vary  Look at the serving size and the total carbohydrate amount listed on the food label  Each serving of food listed below contains about 15 g of carbohydrate   ? ¾ cup of dry, unsweetened, ready-to-eat cereal or ¼ cup of low-fat granola     ?  ½ cup of oatmeal or other cooked cereal     ? ? cup of cooked rice or pasta    • Starchy vegetables and beans:  Each serving of food listed below contains about 15 g of carbohydrate   ? ½ cup of corn, green peas, sweet potatoes, or mashed potatoes    ? ¼ of a large baked potato    ? ½ cup of beans, lentils, and peas (garbanzo, mcginnis, kidney, white, split, black-eyed)    • Crackers and snacks:  Each serving of food listed below contains about 15 g of carbohydrate   ? 3 larry cracker squares or 8 animal crackers     ? 6 saltine-type crackers    ? 3 cups of popcorn or ¾ ounce of pretzels, potato chips, or tortilla chips    • Fruit:  Each serving of food listed below contains about 15 g of carbohydrate   ? 1 small (4 ounce) piece of fresh fruit or ¾ to 1 cup of fresh fruit    ? ½ cup of canned or frozen fruit, packed in natural juice    ? ½ cup (4 ounces) of unsweetened fruit juice    ? 2 tablespoons of dried fruit    • Desserts or sugary foods:  Each serving of food listed below contains about 15 g of carbohydrate   ? 2-inch square unfrosted cake or brownie     ? 2 small cookies    ? ½ cup of ice cream, frozen yogurt, or nondairy frozen yogurt    ? ¼ cup of sherbet or sorbet    ? 1 tablespoon of regular syrup, jam, or jelly    ? 2 tablespoons of light syrup    • Milk and yogurt:  Foods from the milk group contain about 12 g of carbohydrate per serving  ? 1 cup of fat-free or low-fat milk    ? 1 cup of soy milk    ? ? cup of fat-free, yogurt sweetened with artificial sweetener    • Non-starchy vegetables:  Each serving contains about 5 g of carbohydrate   Three servings of non-starch vegetables count as 1 carbohydrate serving  ? ½ cup of cooked vegetables or 1 cup of raw vegetables  This includes beets, broccoli, cabbage, cauliflower, cucumber, mushrooms, tomatoes, and zucchini    ?  ½ cup of vegetable juice    How to use carbohydrate counting to plan meals:   • Count carbohydrate amounts using serving sizes:      ? Pasta dinner example: You plan to have pasta, tossed salad, and an 8-ounce glass of milk  Your healthcare provider tells you that you may have 4 carbohydrate servings for dinner  One carbohydrate serving of pasta is ? cup  One cup of pasta will equal 3 carbohydrate servings  An 8-ounce glass of milk will count as 1 carbohydrate serving  These amounts of food would equal 4 carbohydrate servings  One cup of tossed salad does not count toward your carbohydrate servings  • Count carbohydrate amounts using food labels:  Find the total amount of carbohydrate in a packaged food by reading the food label  Food labels tell you the serving size of the food and the total carbohydrate amount in each serving  Find the serving size on the food label and then decide how many servings you will eat  Multiply the number of servings you plan to eat by the carbohydrate amount per serving  ? Granola bar snack example: Your meal plan allows you to have 2 carbohydrate servings (30 grams) of carbohydrate for a snack  You plan to eat 1 package of granola bars, which contains 2 bars  According to the food label, the serving size of food in this package is 1 bar  Each serving (1 bar) contains 25 grams of carbohydrate  The total amount of carbohydrate in this package of granola bars would be 50 g  Based on your meal plan, you should eat only 1 bar  Follow up with your doctor as directed:  Write down your questions so you remember to ask them during your visits  © Copyright [facility] [employer] 2022 Information is for End User's use only and may not be sold, redistributed or otherwise used for commercial purposes  The above information is an  only  It is not intended as medical advice for individual conditions or treatments  Talk to your doctor, nurse or pharmacist before following any medical regimen to see if it is safe and effective for you      Obesity   AMBULATORY CARE:   Obesity  means your body mass index (BMI) is greater than 27  Your healthcare provider will use your age, height, and weight to measure your BMI  The risks of obesity include  many health problems, including injuries or physical disability  • Diabetes (high blood sugar level)    • High blood pressure or high cholesterol    • Heart disease    • Stroke    • Gallbladder or liver disease    • Cancer of the colon, breast, prostate, liver, or kidney    • Sleep apnea    • Arthritis or gout    Screening  is done to check for health conditions before you have signs or symptoms  If you are 28to 79years old, your blood sugar level may be checked every 3 years for signs of prediabetes or diabetes  Your healthcare provider will check your blood pressure at each visit  High blood pressure can lead to a stroke or other problems  Your provider may check for signs of heart disease, cancer, or other health problems  Seek care immediately if:   • You have a severe headache, confusion, or difficulty speaking  • You have weakness on one side of your body  • You have chest pain, sweating, or shortness of breath  Call your doctor if:   • You have symptoms of gallbladder or liver disease, such as pain in your upper abdomen  • You have knee or hip pain and discomfort while walking  • You have symptoms of diabetes, such as intense hunger and thirst, and frequent urination  • You have symptoms of sleep apnea, such as snoring or daytime sleepiness  • You have questions or concerns about your condition or care  Treatment for obesity  focuses on helping you lose weight to improve your health  Even a small decrease in BMI can reduce the risk for many health problems  Your healthcare provider will help you set a weight-loss goal   • Lifestyle changes  are the first step in treating obesity  These include making healthy food choices and getting regular physical activity   Your healthcare provider may suggest a weight-loss program that involves coaching, education, and therapy  • Medicine  may help you lose weight when it is used with a healthy foods and physical activity  • Surgery  can help you lose weight if you have obesity along with other health problems  Several types of weight-loss surgery are available  Ask your healthcare provider for more information  Tips for safe weight loss:   • Set small, realistic goals  An example of a small goal is to walk for 20 minutes 5 days a week  Anther goal is to lose 5% of your body weight  • Ask for support  Tell friends, family members, and coworkers about your goals  Ask someone to lose weight with you  You may also want to join a weight-loss support group  • Identify foods or triggers that may cause you to overeat  Remove tempting high-calorie foods from your home and workplace  Place a bowl of fresh fruit on your kitchen counter  If stress causes you to eat, find other ways to cope with stress  A counselor or therapist may be able to help you  • Track your daily calories and activity  Write down what you eat and drink  Also write down how many minutes of physical activity you do each day  • Track your weekly weight  Weigh yourself in the morning, before you eat or drink anything but after you use the bathroom  Use the same scale, in the same place, and in similar clothing each time  Only weigh yourself 1 to 2 times each week, or as directed  You may become discouraged if you weigh yourself every day  Eating changes: You will need to eat 500 to 1,000 fewer calories each day than you currently eat to lose 1 to 2 pounds a week  The following changes will help you cut calories:  • Eat smaller portions  Use small plates, no larger than 9 inches in diameter  Fill your plate half full of fruits and vegetables  Measure your food using measuring cups until you know what a serving size looks like  • Eat 3 meals and 1 or 2 snacks each day  Plan your meals in advance   Cook and eat at home most of the time  Eat slowly  Do not skip meals  Skipping meals can lead to overeating later in the day  This can make it harder for you to lose weight  Talk with a dietitian to help you make a meal plan and schedule that is right for you  • Eat fruits and vegetables at every meal   They are low in calories and high in fiber, which makes you feel full  Do not add butter, margarine, or cream sauce to vegetables  Use herbs to season steamed vegetables  • Eat less fat and fewer fried foods  Eat more baked or grilled chicken and fish  These protein sources are lower in calories and fat than red meat  Limit fast food  Dress your salads with olive oil and vinegar instead of bottled dressing  • Limit the amount of sugar you eat  Do not drink sugary beverages  Limit alcohol  Activity changes:  Physical activity is good for your body in many ways  It helps you burn calories and build strong muscles  It decreases stress and depression, and improves your mood  It can also help you sleep better  Talk to your healthcare provider before you begin an exercise program   • Exercise for at least 30 minutes 5 days a week  Start slowly  Set aside time each day for physical activity that you enjoy and that is convenient for you  It is best to do both weight training and an activity that increases your heart rate, such as walking, bicycling, or swimming  • Find ways to be more active  Do yard work and housecleaning  Walk up the stairs instead of using elevators  Spend your leisure time going to events that require walking, such as outdoor festivals or fairs  This extra physical activity can help you lose weight and keep it off  Follow up with your doctor as directed: You may need to meet with a dietitian  Write down your questions so you remember to ask them during your visits    © Copyright [employer] 2022 Information is for End User's use only and may not be sold, redistributed or otherwise used for commercial purposes  The above information is an  only  It is not intended as medical advice for individual conditions or treatments  Talk to your doctor, nurse or pharmacist before following any medical regimen to see if it is safe and effective for you  Low Fat Diet   AMBULATORY CARE:   A low-fat diet  is an eating plan that is low in total fat, unhealthy fat, and cholesterol  You may need to follow a low-fat diet if you have trouble digesting or absorbing fat  You may also need to follow this diet if you have high cholesterol  You can also lower your cholesterol by increasing the amount of fiber in your diet  Soluble fiber is a type of fiber that helps to decrease cholesterol levels  Different types of fat in food:   • Limit unhealthy fats  A diet that is high in cholesterol, saturated fat, and trans fat may cause unhealthy cholesterol levels  Unhealthy cholesterol levels increase your risk of heart disease  ? Cholesterol:  Limit intake of cholesterol to less than 200 mg per day  Cholesterol is found in meat, eggs, and dairy  ? Saturated fat:  Limit saturated fat to less than 7% of your total daily calories  Ask your dietitian how many calories you need each day  Saturated fat is found in butter, cheese, ice cream, whole milk, and palm oil  Saturated fat is also found in meat, such as beef, pork, chicken skin, and processed meats  Processed meats include sausage, hot dogs, and bologna  ? Trans fat:  Avoid trans fat as much as possible  Trans fat is used in fried and baked foods  Foods that say trans fat free on the label may still have up to 0 5 grams of trans fat per serving  • Include healthy fats  Replace foods that are high in saturated and trans fat with foods high in healthy fats  This may help to decrease high cholesterol levels  ? Monounsaturated fats: These are found in avocados, nuts, and vegetable oils, such as olive, canola, and sunflower oil      ? Polyunsaturated fats: These can be found in vegetable oils, such as soybean or corn oil  Omega-3 fats can help to decrease the risk of heart disease  Omega-3 fats are found in fish, such as salmon, herring, trout, and tuna  Omega-3 fats can also be found in plant foods, such as walnuts, flaxseed, soybeans, and canola oil  Foods to limit or avoid:   • Grains:      ? Snacks that are made with partially hydrogenated oils, such as chips, regular crackers, and butter-flavored popcorn    ? High-fat baked goods, such as biscuits, croissants, doughnuts, pies, cookies, and pastries    • Dairy:      ? Whole milk, 2% milk, and yogurt and ice cream made with whole milk    ? Half and half creamer, heavy cream, and whipping cream    ? Cheese, cream cheese, and sour cream    • Meats and proteins:      ? High-fat cuts of meat (T-bone steak, regular hamburger, and ribs)    ? Fried meat, poultry (turkey and chicken), and fish    ? Poultry (chicken and turkey) with skin    ? Cold cuts (salami or bologna), hot dogs, kerns, and sausage    ? Whole eggs and egg yolks    • Vegetables and fruits with added fat:      ? Fried vegetables or vegetables in butter or high-fat sauces, such as cream or cheese sauces    ? Fried fruit or fruit served with butter or cream    • Fats:      ? Butter, stick margarine, and shortening    ? Coconut, palm oil, and palm kernel oil    Foods to include:       • Grains:      ? Whole-grain breads, cereals, pasta, and brown rice    ? Low-fat crackers and pretzels    • Vegetables and fruits:      ? Fresh, frozen, or canned vegetables (no salt or low-sodium)    ? Fresh, frozen, dried, or canned fruit (canned in light syrup or fruit juice)    ? Avocado    • Low-fat dairy products:      ? Nonfat (skim) or 1% milk    ? Nonfat or low-fat cheese, yogurt, and cottage cheese    • Meats and proteins:      ? Chicken or turkey with no skin    ? Baked or broiled fish    ? Lean beef and pork (loin, round, extra lean hamburger)    ?  Beans and peas, unsalted nuts, soy products    ? Egg whites and substitutes    ? Seeds and nuts    • Fats:      ? Unsaturated oil, such as canola, olive, peanut, soybean, or sunflower oil    ? Soft or liquid margarine and vegetable oil spread    ? Low-fat salad dressing  Other ways to decrease fat:   • Read food labels before you buy foods  Choose foods that have less than 30% of calories from fat  Choose low-fat or fat-free dairy products  Remember that fat free does not mean calorie free  These foods still contain calories, and too many calories can lead to weight gain  • Trim fat from meat and avoid fried food  Trim all visible fat from meat before you cook it  Remove the skin from poultry  Do not vivas meat, fish, or poultry  Bake, roast, boil, or broil these foods instead  Avoid fried foods  Eat a baked potato instead of [place] fries  Steam vegetables instead of sautéing them in butter  • Add less fat to foods  Use imitation kerns bits on salads and baked potatoes instead of regular kerns bits  Use fat-free or low-fat salad dressings instead of regular dressings  Use low-fat or nonfat butter-flavored topping instead of regular butter or margarine on popcorn and other foods  Ways to decrease fat in recipes:  Replace high-fat ingredients with low-fat or nonfat ones  This may cause baked goods to be drier than usual  You may need to use nonfat cooking spray on pans to prevent food from sticking  You also may need to change the amount of other ingredients, such as water, in the recipe  Try the following:  • Use low-fat or light margarine instead of regular margarine or shortening  • Use lean ground turkey breast or chicken, or lean ground beef (less than 5% fat) instead of hamburger  • Add 1 teaspoon of canola oil to 8 ounces of skim milk instead of using cream or half and half  • Use grated zucchini, carrots, or apples in breads instead of coconut      • Use blenderized, low-fat cottage cheese, plain tofu, or low-fat ricotta cheese instead of cream cheese  • Use 1 egg white and 1 teaspoon of canola oil, or use ¼ cup (2 ounces) of fat-free egg substitute instead of a whole egg  • Replace half of the oil that is called for in a recipe with applesauce when you bake  Use 3 tablespoons of cocoa powder and 1 tablespoon of canola oil instead of a square of baking chocolate  How to increase fiber:  Eat enough high-fiber foods to get 20 to 30 grams of fiber every day  Slowly increase your fiber intake to avoid stomach cramps, gas, and other problems  • Eat 3 ounces of whole-grain foods each day  An ounce is about 1 slice of bread  Eat whole-grain breads, such as whole-wheat bread  Whole wheat, whole-wheat flour, or other whole grains should be listed as the first ingredient on the food label  Replace white flour with whole-grain flour or use half of each in recipes  Whole-grain flour is heavier than white flour, so you may have to add more yeast or baking powder  • Eat a high-fiber cereal for breakfast   Oatmeal is a good source of soluble fiber  Look for cereals that have bran or fiber in the name  Choose whole-grain products, such as brown rice, barley, and whole-wheat pasta  • Eat more beans, peas, and lentils  For example, add beans to soups or salads  Eat at least 5 cups of fruits and vegetables each day  Eat fruits and vegetables with the peel because the peel is high in fiber  © Copyright [employer] 2022 Information is for End User's use only and may not be sold, redistributed or otherwise used for commercial purposes  The above information is an  only  It is not intended as medical advice for individual conditions or treatments  Talk to your doctor, nurse or pharmacist before following any medical regimen to see if it is safe and effective for you  Heart Healthy Diet   AMBULATORY CARE:   A heart healthy diet  is an eating plan low in unhealthy fats and sodium (salt)  The plan is high in healthy fats and fiber  A heart healthy diet helps improve your cholesterol levels and lowers your risk for heart disease and stroke  A dietitian will teach you how to read and understand food labels  Heart healthy diet guidelines to follow:   • Choose foods that contain healthy fats:      ? Unsaturated fats  include monounsaturated and polyunsaturated fats  Unsaturated fat is found in foods such as soybean, canola, olive, corn, and safflower oils  It is also found in soft tub margarine that is made with liquid vegetable oil  ? Omega-3 fat  is found in certain fish, such as salmon, tuna, and trout, and in walnuts and flaxseed  Eat fish high in omega-3 fats at least 2 times a week  • Limit or do not have unhealthy fats:      ? Cholesterol  is found in animal foods, such as eggs and lobster, and in dairy products made from whole milk  Limit cholesterol to less than 200 mg each day  ? Saturated fat  is found in meats, such as kerns and hamburger  It is also found in chicken or turkey skin, whole milk, and butter  Limit saturated fat to less than 7% of your total daily calories  ? Trans fat  is found in packaged foods, such as potato chips and cookies  It is also in hard margarine, some fried foods, and shortening  Do not eat foods that contain trans fats  • Get 20 to 30 grams of fiber each day  Fruits, vegetables, whole-grain foods, and legumes (cooked beans) are good sources of fiber  • Limit sodium as directed  You may be told to limit sodium, such as to 2,000 mg or less each day  Choose low-sodium or no-salt-added foods  Add little or no salt to food you prepare  Use herbs and spices in place of salt  Include the following in your heart healthy plan:  Ask your dietitian or healthcare provider how many servings to have each day from the following food groups:  • Grains:      ? Whole-wheat breads, cereals, and pastas, and brown rice    ?  Low-fat, low-sodium crackers and chips    • Vegetables:      ? Broccoli, green beans, green peas, and spinach    ? Collards, kale, and lima beans    ? Carrots, sweet potatoes, tomatoes, and peppers    ? Canned vegetables with no salt added    • Fruits:      ? Bananas, peaches, pears, and pineapple    ? Grapes, raisins, and dates    ? Oranges, tangerines, grapefruit, orange juice, and grapefruit juice    ? Apricots, mangoes, melons, and papaya    ? Raspberries and strawberries    ? Canned fruit with no added sugar    • Low-fat dairy:      ? Nonfat (skim) milk, 1% milk, and low-fat almond, cashew, or soy milks fortified with calcium    ? Low-fat cheese, regular or frozen yogurt, and cottage cheese    • Meats and proteins:      ? Lean cuts of beef and pork (loin, leg, round), skinless chicken and turkey    ? Legumes, soy products, egg whites, or nuts    Limit or do not include the following in your heart healthy plan:   • Foods and liquids that contain unhealthy fats and oils:      ? Whole or 2% milk, cream cheese, sour cream, or cheese    ? High-fat cuts of beef (T-bone steaks, ribs), chicken or turkey with skin, and organ meats such as liver    ? Butter, stick margarine, shortening, and cooking oils such as coconut or palm oil    • Foods and liquids high in sodium:      ? Packaged foods, such as frozen dinners, cookies, macaroni and cheese, and cereals with more than 300 mg of sodium per serving    ? Vegetables with added sodium, such as instant potatoes, vegetables with added sauces, or regular canned vegetables    ? Cured or smoked meats, such as hot dogs, kerns, and sausage    ? High-sodium ketchup, barbecue sauce, salad dressing, pickles, olives, soy sauce, or miso    • Foods and liquids high in sugar:      ? Candy, cake, cookies, pies, or doughnuts    ? Soft drinks (soda), sports drinks, or sweetened tea    ? Canned or dry mixes for cakes, soups, sauces, or gravies    Other healthy heart guidelines:   • Do not smoke    Nicotine and other chemicals in cigarettes and cigars can cause lung and heart damage  Ask your healthcare provider for information if you currently smoke and need help to quit  E-cigarettes or smokeless tobacco still contain nicotine  Talk to your provider before you use these products  • Limit or do not drink alcohol as directed  Alcohol can damage your heart and raise your blood pressure  Your healthcare provider may give you specific daily and weekly limits  The general recommended limit is 1 drink a day for women 21 or older and for men 72 or older  Do not have more than 3 drinks within 24 hours or 7 within a week  The recommended limit is 2 drinks a day for men 24to 59years of age  Do not have more than 4 drinks within 24 hours or 14 within a week  A drink of alcohol is 12 ounces of beer, 5 ounces of wine, or 1½ ounces of liquor  • Maintain a healthy weight  Extra body weight makes your heart work harder  Ask your provider what a healthy weight is for you  He or she can help you create a safe weight loss plan, if needed  • Exercise regularly  Exercise can help you maintain a healthy weight and improve your blood pressure and cholesterol levels  Regular exercise can also decrease your risk for heart problems  Ask your provider about the best exercise plan for you  Do not start an exercise program without asking your provider  Follow up with your doctor or cardiologist as directed:  Write down your questions so you remember to ask them during your visits  © Copyright Myron Sanchez 2022 Information is for End User's use only and may not be sold, redistributed or otherwise used for commercial purposes  The above information is an  only  It is not intended as medical advice for individual conditions or treatments  Talk to your doctor, nurse or pharmacist before following any medical regimen to see if it is safe and effective for you

## 2023-03-02 ENCOUNTER — VBI (OUTPATIENT)
Dept: ADMINISTRATIVE | Facility: OTHER | Age: 53
End: 2023-03-02

## 2023-04-29 DIAGNOSIS — Z78.9 DRUG INTOLERANCE: ICD-10-CM

## 2023-04-29 DIAGNOSIS — I10 ESSENTIAL HYPERTENSION: ICD-10-CM

## 2023-04-29 DIAGNOSIS — E11.9 TYPE 2 DIABETES MELLITUS WITHOUT COMPLICATION, WITHOUT LONG-TERM CURRENT USE OF INSULIN (HCC): ICD-10-CM

## 2023-04-29 RX ORDER — GLIMEPIRIDE 4 MG/1
TABLET ORAL
Qty: 180 TABLET | Refills: 1 | Status: SHIPPED | OUTPATIENT
Start: 2023-04-29

## 2023-05-13 DIAGNOSIS — E78.2 MIXED HYPERLIPIDEMIA: ICD-10-CM

## 2023-05-13 DIAGNOSIS — E11.9 TYPE 2 DIABETES MELLITUS WITHOUT COMPLICATION, WITHOUT LONG-TERM CURRENT USE OF INSULIN (HCC): ICD-10-CM

## 2023-05-13 RX ORDER — ATORVASTATIN CALCIUM 20 MG/1
TABLET, FILM COATED ORAL
Qty: 90 TABLET | Refills: 3 | Status: SHIPPED | OUTPATIENT
Start: 2023-05-13

## 2023-05-22 ENCOUNTER — TELEPHONE (OUTPATIENT)
Dept: PAIN MEDICINE | Facility: CLINIC | Age: 53
End: 2023-05-22

## 2023-05-22 DIAGNOSIS — G89.29 CHRONIC BILATERAL LOW BACK PAIN WITHOUT SCIATICA: ICD-10-CM

## 2023-05-22 DIAGNOSIS — M54.16 LUMBAR RADICULOPATHY: ICD-10-CM

## 2023-05-22 DIAGNOSIS — M79.18 MYOFASCIAL PAIN SYNDROME: ICD-10-CM

## 2023-05-22 DIAGNOSIS — M54.50 CHRONIC BILATERAL LOW BACK PAIN WITHOUT SCIATICA: ICD-10-CM

## 2023-05-22 RX ORDER — PREGABALIN 50 MG/1
50 CAPSULE ORAL DAILY
Qty: 90 CAPSULE | Refills: 0 | Status: SHIPPED | OUTPATIENT
Start: 2023-05-22

## 2023-05-22 NOTE — TELEPHONE ENCOUNTER
Pt contacted Call Center requested refill of their medication  Medication Name: pregabalin (LYRICA          Dosage of Med: 50 MG      Frequency of Med: 1 PO QHS x 1 day, then 1 PO BID x 1 day, then 1 PO TID      Remaining Medication: A week supply b      Pharmacy and Location:   [facility]/pharmacy #[id number]- [place] [address], [place] - [address] [facility]   [address] [place] [address]   Phone:  [phone]        Pt  Preferred Callback Phone Number: [phone]      Thank you

## 2023-05-22 NOTE — TELEPHONE ENCOUNTER
Pt refill request for Lyrica 50mg TID  Last Rx 8/25 with x1 refill by [facility]  Spoke with Pt for clarification of how Pt is taking medication  Pt stated he had stopped taking the medication for a while because he was not working & returned back to work approx x1 month ago and has since restarted the Lyrica  Pt states he currently takes x1 tab in the am only with on average 3-4/10 b/l lower back pain noted  Remaining tabs: approx x1 wk  LOV 10/31/22 with [facility]  NOV scheduled for 6/9 with [facility]  Preferred Pharmacy: [facility]      Please advise-

## 2023-06-18 NOTE — PATIENT INSTRUCTIONS
Basic Carbohydrate Counting   AMBULATORY CARE:   Carbohydrate counting  is a way to plan your meals by counting the amount of carbohydrate in foods  Carbohydrates are the sugars, starches, and fiber found in fruit, grains, vegetables, and milk products  Carbohydrates increase your blood sugar levels  Carbohydrate counting can help you eat the right amount of carbohydrate to keep your blood sugar levels under control  What you need to know about planning meals using carbohydrate counting:  • A dietitian or healthcare provider will help you develop a healthy meal plan that works best for you  You will be taught how much carbohydrate to eat or drink for each meal and snack  Your meal plan will be based on your age, weight, usual food intake, and physical activity level  If you have diabetes, it will also include your blood sugar levels and diabetes medicine  Once you know how much carbohydrate you should eat, you can decide what type of food you want to eat  • You will need to know what foods contain carbohydrate and how much they contain  Keep track of the amount of carbohydrate in meals and snacks in order to follow your meal plan  Do not avoid carbohydrates or skip meals  Your blood sugar may fall too low if you do not eat enough carbohydrate or you skip meals  Foods that contain carbohydrate:   • Breads:  Each serving of food listed below contains about 15 g of carbohydrate   ? 1 slice of bread (1 ounce) or 1 flour or corn tortilla (6 inch)    ? ½ of a hamburger bun or ¼ of a large bagel (about 1 ounce)    ? 1 pancake (about 4 inches across and ¼ inch thick)    • Cereals and grains:  Serving sizes of ready-to-eat cereals vary  Look at the serving size and the total carbohydrate amount listed on the food label  Each serving of food listed below contains about 15 g of carbohydrate   ? ¾ cup of dry, unsweetened, ready-to-eat cereal or ¼ cup of low-fat granola     ?  ½ cup of oatmeal or other cooked cereal     ? ? cup of cooked rice or pasta    • Starchy vegetables and beans:  Each serving of food listed below contains about 15 g of carbohydrate   ? ½ cup of corn, green peas, sweet potatoes, or mashed potatoes    ? ¼ of a large baked potato    ? ½ cup of beans, lentils, and peas (garbanzo, mcginnis, kidney, white, split, black-eyed)    • Crackers and snacks:  Each serving of food listed below contains about 15 g of carbohydrate   ? 3 larry cracker squares or 8 animal crackers     ? 6 saltine-type crackers    ? 3 cups of popcorn or ¾ ounce of pretzels, potato chips, or tortilla chips    • Fruit:  Each serving of food listed below contains about 15 g of carbohydrate   ? 1 small (4 ounce) piece of fresh fruit or ¾ to 1 cup of fresh fruit    ? ½ cup of canned or frozen fruit, packed in natural juice    ? ½ cup (4 ounces) of unsweetened fruit juice    ? 2 tablespoons of dried fruit    • Desserts or sugary foods:  Each serving of food listed below contains about 15 g of carbohydrate   ? 2-inch square unfrosted cake or brownie     ? 2 small cookies    ? ½ cup of ice cream, frozen yogurt, or nondairy frozen yogurt    ? ¼ cup of sherbet or sorbet    ? 1 tablespoon of regular syrup, jam, or jelly    ? 2 tablespoons of light syrup    • Milk and yogurt:  Foods from the milk group contain about 12 g of carbohydrate per serving  ? 1 cup of fat-free or low-fat milk    ? 1 cup of soy milk    ? ? cup of fat-free, yogurt sweetened with artificial sweetener    • Non-starchy vegetables:  Each serving contains about 5 g of carbohydrate   Three servings of non-starch vegetables count as 1 carbohydrate serving  ? ½ cup of cooked vegetables or 1 cup of raw vegetables  This includes beets, broccoli, cabbage, cauliflower, cucumber, mushrooms, tomatoes, and zucchini    ?  ½ cup of vegetable juice    How to use carbohydrate counting to plan meals:   • Count carbohydrate amounts using serving sizes:      ? Pasta dinner example: You plan to have pasta, tossed salad, and an 8-ounce glass of milk  Your healthcare provider tells you that you may have 4 carbohydrate servings for dinner  One carbohydrate serving of pasta is ? cup  One cup of pasta will equal 3 carbohydrate servings  An 8-ounce glass of milk will count as 1 carbohydrate serving  These amounts of food would equal 4 carbohydrate servings  One cup of tossed salad does not count toward your carbohydrate servings  • Count carbohydrate amounts using food labels:  Find the total amount of carbohydrate in a packaged food by reading the food label  Food labels tell you the serving size of the food and the total carbohydrate amount in each serving  Find the serving size on the food label and then decide how many servings you will eat  Multiply the number of servings you plan to eat by the carbohydrate amount per serving  ? Granola bar snack example: Your meal plan allows you to have 2 carbohydrate servings (30 grams) of carbohydrate for a snack  You plan to eat 1 package of granola bars, which contains 2 bars  According to the food label, the serving size of food in this package is 1 bar  Each serving (1 bar) contains 25 grams of carbohydrate  The total amount of carbohydrate in this package of granola bars would be 50 g  Based on your meal plan, you should eat only 1 bar  Follow up with your doctor as directed:  Write down your questions so you remember to ask them during your visits  © Copyright Orestes Jarod 2022 Information is for End User's use only and may not be sold, redistributed or otherwise used for commercial purposes  The above information is an  only  It is not intended as medical advice for individual conditions or treatments  Talk to your doctor, nurse or pharmacist before following any medical regimen to see if it is safe and effective for you

## 2023-06-23 ENCOUNTER — OFFICE VISIT (OUTPATIENT)
Dept: INTERNAL MEDICINE CLINIC | Facility: CLINIC | Age: 53
End: 2023-06-23
Payer: COMMERCIAL

## 2023-06-23 VITALS
DIASTOLIC BLOOD PRESSURE: 80 MMHG | SYSTOLIC BLOOD PRESSURE: 128 MMHG | HEIGHT: 72 IN | BODY MASS INDEX: 36.86 KG/M2 | TEMPERATURE: 99 F | WEIGHT: 272.13 LBS | OXYGEN SATURATION: 97 % | HEART RATE: 87 BPM

## 2023-06-23 DIAGNOSIS — I10 ESSENTIAL HYPERTENSION: ICD-10-CM

## 2023-06-23 DIAGNOSIS — G89.4 CHRONIC PAIN SYNDROME: ICD-10-CM

## 2023-06-23 DIAGNOSIS — E66.9 OBESITY (BMI 30-39.9): ICD-10-CM

## 2023-06-23 DIAGNOSIS — D69.6 THROMBOCYTOPENIA (HCC): ICD-10-CM

## 2023-06-23 DIAGNOSIS — E11.9 TYPE 2 DIABETES MELLITUS WITHOUT COMPLICATION, WITHOUT LONG-TERM CURRENT USE OF INSULIN (HCC): Primary | ICD-10-CM

## 2023-06-23 DIAGNOSIS — E78.2 MIXED HYPERLIPIDEMIA: ICD-10-CM

## 2023-06-23 DIAGNOSIS — Z78.9 DRUG INTOLERANCE: ICD-10-CM

## 2023-06-23 LAB — SL AMB POCT HEMOGLOBIN AIC: 6.8 (ref ?–6.5)

## 2023-06-23 PROCEDURE — 83036 HEMOGLOBIN GLYCOSYLATED A1C: CPT | Performed by: INTERNAL MEDICINE

## 2023-06-23 PROCEDURE — 99214 OFFICE O/P EST MOD 30 MIN: CPT | Performed by: INTERNAL MEDICINE

## 2023-06-23 RX ORDER — GLIMEPIRIDE 4 MG/1
8 TABLET ORAL DAILY
Qty: 180 TABLET | Refills: 1 | Status: SHIPPED | OUTPATIENT
Start: 2023-06-23

## 2023-06-23 NOTE — PROGRESS NOTES
Depression Screening and Follow-up Plan: Patient was screened for depression during today's encounter  They screened negative with a PHQ-2 score of 0  Assessment/Plan:  Problem List Items Addressed This Visit        Endocrine    Type 2 diabetes mellitus without complication ([facility] ) - Primary    Relevant Medications    dapagliflozin (Farxiga) 10 MG tablet    glimepiride (AMARYL) 4 mg tablet    semaglutide (Rybelsus) 14 MG tablet    Other Relevant Orders    Comprehensive metabolic panel    LDL cholesterol, direct    Triglycerides    POCT hemoglobin A1c (Completed)       Cardiovascular and Mediastinum    Essential hypertension    Relevant Medications    glimepiride (AMARYL) 4 mg tablet    semaglutide (Rybelsus) 14 MG tablet    Other Relevant Orders    Comprehensive metabolic panel       Hematopoietic and Hemostatic    Thrombocytopenia (HCC)       Other    Mixed hyperlipidemia    Relevant Orders    Comprehensive metabolic panel    LDL cholesterol, direct    Triglycerides    Chronic pain syndrome   Other Visit Diagnoses     Drug intolerance        Relevant Medications    glimepiride (AMARYL) 4 mg tablet    Obesity (BMI 30-39  9)        Relevant Medications    semaglutide (Rybelsus) 14 MG tablet           Diagnoses and all orders for this visit:    Type 2 diabetes mellitus without complication, without long-term current use of insulin (HCC)  -     Comprehensive metabolic panel; Future  -     LDL cholesterol, direct; Future  -     Triglycerides; Future  -     POCT hemoglobin A1c  -     dapagliflozin (Farxiga) 10 MG tablet; Take 1 tablet (10 mg total) by mouth daily  -     glimepiride (AMARYL) 4 mg tablet; Take 2 tablets (8 mg total) by mouth daily  -     semaglutide (Rybelsus) 14 MG tablet; Take 1 tablet (14 mg total) by mouth daily before breakfast    Essential hypertension  -     Comprehensive metabolic panel; Future  -     glimepiride (AMARYL) 4 mg tablet;  Take 2 tablets (8 mg total) by mouth daily  -     semaglutide (Rybelsus) 14 MG tablet; Take 1 tablet (14 mg total) by mouth daily before breakfast    Thrombocytopenia (HCC)    Mixed hyperlipidemia  -     Comprehensive metabolic panel; Future  -     LDL cholesterol, direct; Future  -     Triglycerides; Future    Chronic pain syndrome    Drug intolerance  -     glimepiride (AMARYL) 4 mg tablet; Take 2 tablets (8 mg total) by mouth daily    Obesity (BMI 30-39 9)  -     semaglutide (Rybelsus) 14 MG tablet; Take 1 tablet (14 mg total) by mouth daily before breakfast        No problem-specific Assessment & Plan notes found for this encounter  A/P: Doing ok and will check labs  In office HgA1c was good at 6 8,but is young and would like him around 6 5  Wt is down as well  Will increase his GLP-1  Continue current treatment and RTC three months for routine  Subjective:      Patient ID: Christy Mann is a 48 y o  male  WM RTC for f/u DM, HTN, etc  Doing ok and no new issues  Remains active w/o difficulty and no falls  Sugars less than 140 and no low sugar events  Chronic pain is manageable  Due for labs  The following portions of the patient's history were reviewed and updated as appropriate:   He has a past medical history of Diabetes mellitus ([facility] ), Hyperlipidemia, Hypertension, and Kidney stones  ,  does not have any pertinent problems on file  ,   has a past surgical history that includes Neck surgery; Cholecystectomy; Epidural block injection (Left, 6/3/2021); FL guided needle plac bx/asp/inj (6/3/2021); Epidural block injection (Left, 9/16/2021); FL guided needle plac bx/asp/inj (9/16/2021); and Epidural block injection (Left, 9/23/2022)  ,  family history includes Breast cancer in his mother; Diabetes in his father, mother, and sister; Fibromyalgia in his sister; Hypertension in his mother  ,   reports that he has never smoked  He has never used smokeless tobacco  He reports that he does not drink alcohol and does not use drugs  ,  is allergic to codeine, lisinopril, metformin, neurontin [gabapentin], and penicillins     Current Outpatient Medications   Medication Sig Dispense Refill   • dapagliflozin (Farxiga) 10 MG tablet Take 1 tablet (10 mg total) by mouth daily 90 tablet 1   • glimepiride (AMARYL) 4 mg tablet Take 2 tablets (8 mg total) by mouth daily 180 tablet 1   • semaglutide (Rybelsus) 14 MG tablet Take 1 tablet (14 mg total) by mouth daily before breakfast 90 tablet 1   • Ascorbic Acid (VITAMIN C) 1000 MG tablet Take 1,000 mg by mouth daily     • atorvastatin (LIPITOR) 20 mg tablet TAKE 1 TABLET BY MOUTH EVERY DAY 90 tablet 3   • Blood Glucose Monitoring Suppl ([employer] CONTOUR MONITOR) TRACEY by Does not apply route daily 1 Device 0   • Cholecalciferol (VITAMIN D3) 408152 UNIT/GM POWD Use 1 capsule daily      • Contour Next Test test strip CHECK BLOOD GLUCOSE TWICE A DAY 50 strip 3   • metoprolol succinate (TOPROL-XL) 100 mg 24 hr tablet Take 0 5 tablets (50 mg total) by mouth daily 45 tablet 3   • pregabalin (LYRICA) 50 mg capsule Take 1 capsule (50 mg total) by mouth daily 90 capsule 0     No current facility-administered medications for this visit  Review of Systems   Constitutional: Negative for activity change, chills, diaphoresis, fatigue and fever  HENT: Negative  Eyes: Negative for visual disturbance  Respiratory: Negative for cough, chest tightness, shortness of breath and wheezing  Cardiovascular: Negative for chest pain, palpitations and leg swelling  Gastrointestinal: Negative for abdominal pain, constipation, diarrhea, nausea and vomiting  Endocrine: Negative for cold intolerance and heat intolerance  Genitourinary: Negative for difficulty urinating, dysuria and frequency  Musculoskeletal: Negative for arthralgias, gait problem and myalgias  Neurological: Negative for dizziness, seizures, syncope, weakness, light-headedness and headaches     Psychiatric/Behavioral: Negative for confusion, dysphoric mood and sleep disturbance  The patient is not nervous/anxious  PHQ-2/9 Depression Screening    Little interest or pleasure in doing things: 0 - not at all  Feeling down, depressed, or hopeless: 0 - not at all  PHQ-2 Score: 0  PHQ-2 Interpretation: Negative depression screen        Objective:  Vitals:    06/23/23 1427   BP: 128/80   Pulse: 87   Temp: 99 °F (37 2 °C)   SpO2: 97%   Weight: 123 kg (272 lb 2 oz)   Height: 6' (1 829 m)     Body mass index is 36 91 kg/m²  Physical Exam  Vitals and nursing note reviewed  Constitutional:       General: He is not in acute distress  Appearance: Normal appearance  He is not ill-appearing  HENT:      Head: Normocephalic and atraumatic  Mouth/Throat:      Mouth: Mucous membranes are moist    Eyes:      Extraocular Movements: Extraocular movements intact  Conjunctiva/sclera: Conjunctivae normal       Pupils: Pupils are equal, round, and reactive to light  Neck:      Vascular: No carotid bruit  Cardiovascular:      Rate and Rhythm: Normal rate and regular rhythm  Heart sounds: Normal heart sounds  No murmur heard  Pulmonary:      Effort: Pulmonary effort is normal  No respiratory distress  Breath sounds: Normal breath sounds  No wheezing, rhonchi or rales  Abdominal:      General: Bowel sounds are normal  There is no distension  Tenderness: There is no abdominal tenderness  Musculoskeletal:      Cervical back: Neck supple  Right lower leg: No edema  Left lower leg: No edema  Neurological:      General: No focal deficit present  Mental Status: He is alert and oriented to person, place, and time  Mental status is at baseline  Cranial Nerves: No cranial nerve deficit  Psychiatric:         Mood and Affect: Mood normal          Behavior: Behavior normal          Thought Content:  Thought content normal          Judgment: Judgment normal

## 2023-06-26 ENCOUNTER — TELEPHONE (OUTPATIENT)
Dept: PAIN MEDICINE | Facility: CLINIC | Age: 53
End: 2023-06-26

## 2023-06-26 NOTE — TELEPHONE ENCOUNTER
Caller: patient    Doctor: brad    Reason for call: [facility]omy #  [id number]  Prior pauth for pregabalin     Call back#:

## 2023-06-27 DIAGNOSIS — E11.9 TYPE 2 DIABETES MELLITUS WITHOUT COMPLICATION, WITHOUT LONG-TERM CURRENT USE OF INSULIN (HCC): ICD-10-CM

## 2023-06-27 RX ORDER — DAPAGLIFLOZIN 10 MG/1
TABLET, FILM COATED ORAL
Qty: 90 TABLET | Refills: 1 | Status: SHIPPED | OUTPATIENT
Start: 2023-06-27

## 2023-06-27 NOTE — TELEPHONE ENCOUNTER
Caller: [employer]    Doctor: Jenniffer    Reason for call:  pregabalin was approved starting today    Case# [id number]  Call back#:   [phone]

## 2023-06-30 ENCOUNTER — TELEPHONE (OUTPATIENT)
Dept: INTERNAL MEDICINE CLINIC | Facility: CLINIC | Age: 53
End: 2023-06-30

## 2023-06-30 NOTE — TELEPHONE ENCOUNTER
Patient mother called   Patient needs a PA for Dilcia and the rybelsus    She wants to know if you got anywhere with this? States son is out of medication and she would like him to get started back on these two medications. She said it is important that you call her back. She called a couple days ago but never heard anything.       [place]

## 2023-07-03 DIAGNOSIS — I10 ESSENTIAL HYPERTENSION: ICD-10-CM

## 2023-07-03 RX ORDER — METOPROLOL SUCCINATE 100 MG/1
TABLET, EXTENDED RELEASE ORAL
Qty: 45 TABLET | Refills: 3 | Status: SHIPPED | OUTPATIENT
Start: 2023-07-03

## 2023-07-13 ENCOUNTER — TELEPHONE (OUTPATIENT)
Dept: INTERNAL MEDICINE CLINIC | Facility: CLINIC | Age: 53
End: 2023-07-13

## 2023-07-13 DIAGNOSIS — I10 ESSENTIAL HYPERTENSION: ICD-10-CM

## 2023-07-13 DIAGNOSIS — E11.9 TYPE 2 DIABETES MELLITUS WITHOUT COMPLICATION, WITHOUT LONG-TERM CURRENT USE OF INSULIN (HCC): ICD-10-CM

## 2023-07-13 DIAGNOSIS — E66.9 OBESITY (BMI 30-39.9): ICD-10-CM

## 2023-07-13 DIAGNOSIS — N18.2 CKD (CHRONIC KIDNEY DISEASE) STAGE 2, GFR 60-89 ML/MIN: Primary | ICD-10-CM

## 2023-07-13 DIAGNOSIS — N18.2 CKD (CHRONIC KIDNEY DISEASE) STAGE 2, GFR 60-89 ML/MIN: ICD-10-CM

## 2023-07-13 RX ORDER — EMPAGLIFLOZIN 10 MG/1
TABLET, FILM COATED ORAL
Qty: 90 TABLET | Refills: 3 | Status: SHIPPED | OUTPATIENT
Start: 2023-07-13

## 2023-07-15 ENCOUNTER — APPOINTMENT (OUTPATIENT)
Dept: LAB | Facility: HOSPITAL | Age: 53
End: 2023-07-15
Payer: COMMERCIAL

## 2023-07-15 DIAGNOSIS — E78.2 MIXED HYPERLIPIDEMIA: ICD-10-CM

## 2023-07-15 DIAGNOSIS — I10 ESSENTIAL HYPERTENSION: ICD-10-CM

## 2023-07-15 DIAGNOSIS — E11.9 TYPE 2 DIABETES MELLITUS WITHOUT COMPLICATION, WITHOUT LONG-TERM CURRENT USE OF INSULIN (HCC): ICD-10-CM

## 2023-07-15 LAB
ALBUMIN SERPL BCP-MCNC: 3.9 G/DL (ref 3.5–5)
ALP SERPL-CCNC: 106 U/L (ref 34–104)
ALT SERPL W P-5'-P-CCNC: 48 U/L (ref 7–52)
ANION GAP SERPL CALCULATED.3IONS-SCNC: 7 MMOL/L
AST SERPL W P-5'-P-CCNC: 46 U/L (ref 13–39)
BILIRUB SERPL-MCNC: 1.07 MG/DL (ref 0.2–1)
BUN SERPL-MCNC: 14 MG/DL (ref 5–25)
CALCIUM SERPL-MCNC: 9.1 MG/DL (ref 8.4–10.2)
CHLORIDE SERPL-SCNC: 102 MMOL/L (ref 96–108)
CO2 SERPL-SCNC: 31 MMOL/L (ref 21–32)
CREAT SERPL-MCNC: 0.86 MG/DL (ref 0.6–1.3)
GFR SERPL CREATININE-BSD FRML MDRD: 100 ML/MIN/1.73SQ M
GLUCOSE P FAST SERPL-MCNC: 133 MG/DL (ref 65–99)
LDLC SERPL DIRECT ASSAY-MCNC: 52 MG/DL (ref 0–100)
POTASSIUM SERPL-SCNC: 4.4 MMOL/L (ref 3.5–5.3)
PROT SERPL-MCNC: 7.1 G/DL (ref 6.4–8.4)
SODIUM SERPL-SCNC: 140 MMOL/L (ref 135–147)
TRIGL SERPL-MCNC: 64 MG/DL

## 2023-07-15 PROCEDURE — 36415 COLL VENOUS BLD VENIPUNCTURE: CPT

## 2023-07-15 PROCEDURE — 80053 COMPREHEN METABOLIC PANEL: CPT

## 2023-07-15 PROCEDURE — 83721 ASSAY OF BLOOD LIPOPROTEIN: CPT

## 2023-07-15 PROCEDURE — 84478 ASSAY OF TRIGLYCERIDES: CPT

## 2023-07-26 DIAGNOSIS — E66.9 OBESITY (BMI 30-39.9): ICD-10-CM

## 2023-07-26 DIAGNOSIS — I10 ESSENTIAL HYPERTENSION: ICD-10-CM

## 2023-07-26 DIAGNOSIS — E11.9 TYPE 2 DIABETES MELLITUS WITHOUT COMPLICATION, WITHOUT LONG-TERM CURRENT USE OF INSULIN (HCC): ICD-10-CM

## 2023-07-26 DIAGNOSIS — N18.2 CKD (CHRONIC KIDNEY DISEASE) STAGE 2, GFR 60-89 ML/MIN: ICD-10-CM

## 2023-07-28 ENCOUNTER — OFFICE VISIT (OUTPATIENT)
Dept: PAIN MEDICINE | Facility: CLINIC | Age: 53
End: 2023-07-28
Payer: COMMERCIAL

## 2023-07-28 VITALS
HEIGHT: 72 IN | BODY MASS INDEX: 37.38 KG/M2 | DIASTOLIC BLOOD PRESSURE: 82 MMHG | WEIGHT: 276 LBS | SYSTOLIC BLOOD PRESSURE: 155 MMHG | HEART RATE: 62 BPM

## 2023-07-28 DIAGNOSIS — M79.18 MYOFASCIAL PAIN SYNDROME: ICD-10-CM

## 2023-07-28 DIAGNOSIS — G89.29 CHRONIC BILATERAL LOW BACK PAIN WITHOUT SCIATICA: ICD-10-CM

## 2023-07-28 DIAGNOSIS — M54.16 LUMBAR RADICULOPATHY: ICD-10-CM

## 2023-07-28 DIAGNOSIS — M54.50 CHRONIC BILATERAL LOW BACK PAIN WITHOUT SCIATICA: ICD-10-CM

## 2023-07-28 DIAGNOSIS — G89.4 CHRONIC PAIN SYNDROME: Primary | ICD-10-CM

## 2023-07-28 PROCEDURE — 99214 OFFICE O/P EST MOD 30 MIN: CPT | Performed by: NURSE PRACTITIONER

## 2023-07-28 RX ORDER — PREGABALIN 50 MG/1
50 CAPSULE ORAL 2 TIMES DAILY
Qty: 60 CAPSULE | Refills: 1 | Status: SHIPPED | OUTPATIENT
Start: 2023-07-28

## 2023-07-28 NOTE — PROGRESS NOTES
Assessment:  1. Chronic pain syndrome    2. Chronic bilateral low back pain without sciatica    3. Lumbar radiculopathy    4. Myofascial pain syndrome        Plan:  While the patient was in the office today, I did have a thorough conversation regarding their chronic pain syndrome, medication management, and treatment plan options. Patient is being seen for a follow-up visit. Overall, he reports that his pain is reasonably controlled. He takes Lyrica 50 mg once in the morning. He reports increased pain in the morning and would like to add a nighttime dose of Lyrica. At this point, we will increase Lyrica to 50 mg twice daily. A new prescription was sent to his pharmacy. Patient previously underwent a left-sided L3-4 transforaminal epidural steroid injection on 9/23/2022. His pain has been very minimal since. We will consider repeating left-sided L3-4 transforaminal epidural steroid injection in the future if needed. Follow-up in 6 weeks. History of Present Illness: The patient is a 48 y.o. male who presents for a follow up office visit in regards to Hip Pain, Back Pain, Leg Pain, and Foot Pain. The patient’s current symptoms include complaints of low back pain. Pain can radiate down the left leg. Current pain level is a 1/10. Quality pain is described as burning, sharp. Current pain medications includes: Lyrica 50 mg once daily. The patient reports that this regimen is providing at least 50% pain relief. The patient is reporting no side effects from this pain medication regimen. I have personally reviewed and/or updated the patient's past medical history, past surgical history, family history, social history, current medications, allergies, and vital signs today. Review of Systems  Review of Systems   Constitutional: Negative for unexpected weight change. HENT: Negative for hearing loss. Eyes: Negative for visual disturbance.    Respiratory: Negative for shortness of breath. Cardiovascular: Negative for leg swelling. Gastrointestinal: Negative for constipation. Endocrine: Negative for polyuria. Genitourinary: Negative for difficulty urinating. Musculoskeletal: Positive for arthralgias, gait problem and myalgias. Negative for joint swelling. Decreased range of motion  Pain in extremity- left leg   Skin: Negative for rash. Neurological: Negative for weakness and headaches. Psychiatric/Behavioral: Negative for decreased concentration. All other systems reviewed and are negative.         Past Medical History:   Diagnosis Date   • Diabetes mellitus ([address])    • Hyperlipidemia    • Hypertension    • Kidney stones        Past Surgical History:   Procedure Laterality Date   • CHOLECYSTECTOMY     • EPIDURAL BLOCK INJECTION Left 6/3/2021    Procedure: Left L3-4 transforaminal epidural steroid injection;  Surgeon: Padmaja Delarosa MD;  Location: [facility];  Service: Pain Management    • EPIDURAL BLOCK INJECTION Left 9/16/2021    Procedure: L3-4 TRANSFORAMINAL EPIDURAL STERIOD INJECTION;  Surgeon: Padmaja Delarosa MD;  Location: [facility];  Service: Pain Management    • EPIDURAL BLOCK INJECTION Left 9/23/2022    Procedure: BLOCK / INJECTION EPIDURAL STEROID LUMBAR  left L3-4 TFESI;  Surgeon: Padmaja Delarosa MD;  Location: [facility];  Service: Pain Management    • FL GUIDED NEEDLE PLAC BX/ASP/INJ  6/3/2021   • FL GUIDED NEEDLE PLAC BX/ASP/INJ  9/16/2021   • NECK SURGERY         Family History   Problem Relation Age of Onset   • Hypertension Mother    • Diabetes Mother    • Breast cancer Mother    • Diabetes Father    • Diabetes Sister    • Fibromyalgia Sister        Social History     Occupational History   • Occupation: EMPLOYED   Tobacco Use   • Smoking status: Never   • Smokeless tobacco: Never   Vaping Use   • Vaping Use: Never used   Substance and Sexual Activity   • Alcohol use: Never   • Drug use: Never   • Sexual activity: Not Currently Current Outpatient Medications:   •  Ascorbic Acid (VITAMIN C) 1000 MG tablet, Take 1,000 mg by mouth daily, Disp: , Rfl:   •  atorvastatin (LIPITOR) 20 mg tablet, TAKE 1 TABLET BY MOUTH EVERY DAY, Disp: 90 tablet, Rfl: 3  •  Blood Glucose Monitoring Suppl ([employer] CONTOUR MONITOR) TRACEY, by Does not apply route daily, Disp: 1 Device, Rfl: 0  •  Cholecalciferol (VITAMIN D3) 354213 UNIT/GM POWD, Use 1 capsule daily , Disp: , Rfl:   •  Contour Next Test test strip, CHECK BLOOD GLUCOSE TWICE A DAY, Disp: 50 strip, Rfl: 3  •  Empagliflozin (Jardiance) 10 MG TABS tablet, Take 1 tablet (10 mg total) by mouth daily, Disp: 90 tablet, Rfl: 3  •  glimepiride (AMARYL) 4 mg tablet, Take 2 tablets (8 mg total) by mouth daily, Disp: 180 tablet, Rfl: 1  •  metoprolol succinate (TOPROL-XL) 100 mg 24 hr tablet, TAKE 1/2 TABLET BY MOUTH DAILY, Disp: 45 tablet, Rfl: 3  •  pregabalin (LYRICA) 50 mg capsule, Take 1 capsule (50 mg total) by mouth 2 (two) times a day, Disp: 60 capsule, Rfl: 1  •  semaglutide (Rybelsus) 14 MG tablet, Take 1 tablet (14 mg total) by mouth daily before breakfast, Disp: 90 tablet, Rfl: 1    Allergies   Allergen Reactions   • Codeine Nausea Only   • Lisinopril      swelling   • Metformin      GI   • Neurontin [Gabapentin] Hives   • Penicillins Hives       Physical Exam:    /82   Pulse 62   Ht 6' (1.829 m)   Wt 125 kg (276 lb)   BMI 37.43 kg/m²     Constitutional:normal, well developed, well nourished, alert, in no distress and non-toxic and no overt pain behavior.   Eyes:anicteric  HEENT:grossly intact  Neck:supple, symmetric, trachea midline and no masses   Pulmonary:even and unlabored  Cardiovascular:No edema or pitting edema present  Skin:Normal without rashes or lesions and well hydrated  Psychiatric:Mood and affect appropriate  Neurologic:Cranial Nerves II-XII grossly intact  Musculoskeletal:normal    Imaging  No orders to display       No orders of the defined types were placed in this encounter.

## 2023-09-15 ENCOUNTER — OFFICE VISIT (OUTPATIENT)
Dept: PAIN MEDICINE | Facility: CLINIC | Age: 53
End: 2023-09-15
Payer: COMMERCIAL

## 2023-09-15 VITALS
DIASTOLIC BLOOD PRESSURE: 75 MMHG | HEART RATE: 64 BPM | BODY MASS INDEX: 36.57 KG/M2 | HEIGHT: 72 IN | WEIGHT: 270 LBS | SYSTOLIC BLOOD PRESSURE: 117 MMHG

## 2023-09-15 DIAGNOSIS — M54.50 CHRONIC BILATERAL LOW BACK PAIN WITHOUT SCIATICA: ICD-10-CM

## 2023-09-15 DIAGNOSIS — M79.18 MYOFASCIAL PAIN SYNDROME: ICD-10-CM

## 2023-09-15 DIAGNOSIS — M54.16 LUMBAR RADICULOPATHY: ICD-10-CM

## 2023-09-15 DIAGNOSIS — G89.29 CHRONIC BILATERAL LOW BACK PAIN WITHOUT SCIATICA: ICD-10-CM

## 2023-09-15 PROCEDURE — 99214 OFFICE O/P EST MOD 30 MIN: CPT | Performed by: NURSE PRACTITIONER

## 2023-09-15 RX ORDER — PREGABALIN 50 MG/1
50 CAPSULE ORAL 2 TIMES DAILY
Qty: 60 CAPSULE | Refills: 2 | Status: SHIPPED | OUTPATIENT
Start: 2023-09-15

## 2023-09-15 NOTE — PROGRESS NOTES
Assessment:  1. Chronic bilateral low back pain without sciatica    2. Lumbar radiculopathy    3. Myofascial pain syndrome        Plan:  While the patient was in the office today, I did have a thorough conversation regarding their chronic pain syndrome, medication management, and treatment plan options. Patient is being seen for a follow-up visit. He was last seen here on 7/28/2023 at which time Lyrica was increased to twice daily. It has noticed improvement in his pain with the increased dosage of Lyrica. We will continue with Lyrica 50 mg twice daily. A prescription was sent to his pharmacy with 2 refills. He previously underwent a left-sided L3-4 transforaminal epidural steroid injection on 9/23/2022. His pain has been very tolerable since. We will consider repeating a left-sided L3-4 transforaminal epidural steroid injection in the future if needed. Follow-up in 3 months. History of Present Illness: The patient is a 48 y.o. male who presents for a follow up office visit in regards to Back Pain and Leg Pain. The patient’s current symptoms include complaints of low back and left leg pain. Current pain level is a 0/10. Current pain medications includes: Lyrica 50 mg twice daily. The patient reports that this regimen is providing almost 100% pain relief. The patient is reporting no side effects from this pain medication regimen. I have personally reviewed and/or updated the patient's past medical history, past surgical history, family history, social history, current medications, allergies, and vital signs today. Review of Systems  Review of Systems   Constitutional: Negative for unexpected weight change. HENT: Negative for hearing loss. Eyes: Negative for visual disturbance. Respiratory: Negative for shortness of breath. Cardiovascular: Negative for leg swelling. Gastrointestinal: Negative for constipation. Endocrine: Negative for polyuria.    Genitourinary: Negative for difficulty urinating. Musculoskeletal: Positive for gait problem. Negative for joint swelling and myalgias. Pain in extremity- left leg   Skin: Negative for rash. Neurological: Negative for weakness and headaches. Psychiatric/Behavioral: Negative for decreased concentration. All other systems reviewed and are negative.         Past Medical History:   Diagnosis Date   • Diabetes mellitus ([address])    • Hyperlipidemia    • Hypertension    • Kidney stones        Past Surgical History:   Procedure Laterality Date   • CHOLECYSTECTOMY     • EPIDURAL BLOCK INJECTION Left 6/3/2021    Procedure: Left L3-4 transforaminal epidural steroid injection;  Surgeon: Nena Swanson MD;  Location: [facility];  Service: Pain Management    • EPIDURAL BLOCK INJECTION Left 9/16/2021    Procedure: L3-4 TRANSFORAMINAL EPIDURAL STERIOD INJECTION;  Surgeon: Nena Swanson MD;  Location: [facility];  Service: Pain Management    • EPIDURAL BLOCK INJECTION Left 9/23/2022    Procedure: BLOCK / INJECTION EPIDURAL STEROID LUMBAR  left L3-4 TFESI;  Surgeon: Nena Swanson MD;  Location: [facility];  Service: Pain Management    • FL GUIDED NEEDLE PLAC BX/ASP/INJ  6/3/2021   • FL GUIDED NEEDLE PLAC BX/ASP/INJ  9/16/2021   • NECK SURGERY         Family History   Problem Relation Age of Onset   • Hypertension Mother    • Diabetes Mother    • Breast cancer Mother    • Diabetes Father    • Diabetes Sister    • Fibromyalgia Sister        Social History     Occupational History   • Occupation: EMPLOYED   Tobacco Use   • Smoking status: Never   • Smokeless tobacco: Never   Vaping Use   • Vaping Use: Never used   Substance and Sexual Activity   • Alcohol use: Never   • Drug use: Never   • Sexual activity: Not Currently         Current Outpatient Medications:   •  Ascorbic Acid (VITAMIN C) 1000 MG tablet, Take 1,000 mg by mouth daily, Disp: , Rfl:   •  atorvastatin (LIPITOR) 20 mg tablet, TAKE 1 TABLET BY MOUTH EVERY DAY, Disp: 90 tablet, Rfl: 3  •  Blood Glucose Monitoring Suppl (IVY CONTOUR MONITOR) TRACEY, by Does not apply route daily, Disp: 1 Device, Rfl: 0  •  Cholecalciferol (VITAMIN D3) 182100 UNIT/GM POWD, Use 1 capsule daily , Disp: , Rfl:   •  Contour Next Test test strip, CHECK BLOOD GLUCOSE TWICE A DAY, Disp: 50 strip, Rfl: 3  •  Empagliflozin (Jardiance) 10 MG TABS tablet, Take 1 tablet (10 mg total) by mouth daily, Disp: 90 tablet, Rfl: 3  •  glimepiride (AMARYL) 4 mg tablet, Take 2 tablets (8 mg total) by mouth daily, Disp: 180 tablet, Rfl: 1  •  metoprolol succinate (TOPROL-XL) 100 mg 24 hr tablet, TAKE 1/2 TABLET BY MOUTH DAILY, Disp: 45 tablet, Rfl: 3  •  pregabalin (LYRICA) 50 mg capsule, Take 1 capsule (50 mg total) by mouth 2 (two) times a day, Disp: 60 capsule, Rfl: 2  •  semaglutide (Rybelsus) 14 MG tablet, Take 1 tablet (14 mg total) by mouth daily before breakfast, Disp: 90 tablet, Rfl: 1    Allergies   Allergen Reactions   • Codeine Nausea Only   • Lisinopril      swelling   • Metformin      GI   • Neurontin [Gabapentin] Hives   • Penicillins Hives       Physical Exam:    /75   Pulse 64   Ht 6' (1.829 m)   Wt 122 kg (270 lb)   BMI 36.62 kg/m²     Constitutional:normal, well developed, well nourished, alert, in no distress and non-toxic and no overt pain behavior. Eyes:anicteric  HEENT:grossly intact  Neck:supple, symmetric, trachea midline and no masses   Pulmonary:even and unlabored  Cardiovascular:No edema or pitting edema present  Skin:Normal without rashes or lesions and well hydrated  Psychiatric:Mood and affect appropriate  Neurologic:Cranial Nerves II-XII grossly intact  Musculoskeletal:normal    Imaging  No orders to display       No orders of the defined types were placed in this encounter.

## 2023-09-17 NOTE — PATIENT INSTRUCTIONS
Basic Carbohydrate Counting   AMBULATORY CARE:   Carbohydrate counting  is a way to plan your meals by counting the amount of carbohydrate in foods. Carbohydrates are the sugars, starches, and fiber found in fruit, grains, vegetables, and milk products. Carbohydrates increase your blood sugar levels. Carbohydrate counting can help you eat the right amount of carbohydrate to keep your blood sugar levels under control. What you need to know about planning meals using carbohydrate counting:  • A dietitian or healthcare provider will help you develop a healthy meal plan that works best for you. You will be taught how much carbohydrate to eat or drink for each meal and snack. Your meal plan will be based on your age, weight, usual food intake, and physical activity level. If you have diabetes, it will also include your blood sugar levels and diabetes medicine. Once you know how much carbohydrate you should eat, you can decide what type of food you want to eat. • You will need to know what foods contain carbohydrate and how much they contain. Keep track of the amount of carbohydrate in meals and snacks in order to follow your meal plan. Do not avoid carbohydrates or skip meals. Your blood sugar may fall too low if you do not eat enough carbohydrate or you skip meals. Foods that contain carbohydrate:   • Breads:  Each serving of food listed below contains about 15 g of carbohydrate . ? 1 slice of bread (1 ounce) or 1 flour or corn tortilla (6 inch)    ? ½ of a hamburger bun or ¼ of a large bagel (about 1 ounce)    ? 1 pancake (about 4 inches across and ¼ inch thick)    • Cereals and grains:  Serving sizes of ready-to-eat cereals vary. Look at the serving size and the total carbohydrate amount listed on the food label. Each serving of food listed below contains about 15 g of carbohydrate . ? ¾ cup of dry, unsweetened, ready-to-eat cereal or ¼ cup of low-fat granola     ?  ½ cup of oatmeal or other cooked cereal     ? ? cup of cooked rice or pasta    • Starchy vegetables and beans:  Each serving of food listed below contains about 15 g of carbohydrate . ? ½ cup of corn, green peas, sweet potatoes, or mashed potatoes    ? ¼ of a large baked potato    ? ½ cup of beans, lentils, and peas (garbanzo, mcginnis, kidney, white, split, black-eyed)    • Crackers and snacks:  Each serving of food listed below contains about 15 g of carbohydrate . ? 3 larry cracker squares or 8 animal crackers     ? 6 saltine-type crackers    ? 3 cups of popcorn or ¾ ounce of pretzels, potato chips, or tortilla chips    • Fruit:  Each serving of food listed below contains about 15 g of carbohydrate . ? 1 small (4 ounce) piece of fresh fruit or ¾ to 1 cup of fresh fruit    ? ½ cup of canned or frozen fruit, packed in natural juice    ? ½ cup (4 ounces) of unsweetened fruit juice    ? 2 tablespoons of dried fruit    • Desserts or sugary foods:  Each serving of food listed below contains about 15 g of carbohydrate . ? 2-inch square unfrosted cake or brownie     ? 2 small cookies    ? ½ cup of ice cream, frozen yogurt, or nondairy frozen yogurt    ? ¼ cup of sherbet or sorbet    ? 1 tablespoon of regular syrup, jam, or jelly    ? 2 tablespoons of light syrup    • Milk and yogurt:  Foods from the milk group contain about 12 g of carbohydrate per serving. ? 1 cup of fat-free or low-fat milk    ? 1 cup of soy milk    ? ? cup of fat-free, yogurt sweetened with artificial sweetener    • Non-starchy vegetables:  Each serving contains about 5 g of carbohydrate . Three servings of non-starch vegetables count as 1 carbohydrate serving. ? ½ cup of cooked vegetables or 1 cup of raw vegetables. This includes beets, broccoli, cabbage, cauliflower, cucumber, mushrooms, tomatoes, and zucchini    ?  ½ cup of vegetable juice    How to use carbohydrate counting to plan meals:   • Count carbohydrate amounts using serving sizes:      ? Pasta dinner example: You plan to have pasta, tossed salad, and an 8-ounce glass of milk. Your healthcare provider tells you that you may have 4 carbohydrate servings for dinner. One carbohydrate serving of pasta is ? cup. One cup of pasta will equal 3 carbohydrate servings. An 8-ounce glass of milk will count as 1 carbohydrate serving. These amounts of food would equal 4 carbohydrate servings. One cup of tossed salad does not count toward your carbohydrate servings. • Count carbohydrate amounts using food labels:  Find the total amount of carbohydrate in a packaged food by reading the food label. Food labels tell you the serving size of the food and the total carbohydrate amount in each serving. Find the serving size on the food label and then decide how many servings you will eat. Multiply the number of servings you plan to eat by the carbohydrate amount per serving. ? Granola bar snack example: Your meal plan allows you to have 2 carbohydrate servings (30 grams) of carbohydrate for a snack. You plan to eat 1 package of granola bars, which contains 2 bars. According to the food label, the serving size of food in this package is 1 bar. Each serving (1 bar) contains 25 grams of carbohydrate. The total amount of carbohydrate in this package of granola bars would be 50 g. Based on your meal plan, you should eat only 1 bar. Follow up with your doctor as directed:  Write down your questions so you remember to ask them during your visits. © Copyright Tracey Bolivar 2022 Information is for End User's use only and may not be sold, redistributed or otherwise used for commercial purposes. The above information is an  only. It is not intended as medical advice for individual conditions or treatments. Talk to your doctor, nurse or pharmacist before following any medical regimen to see if it is safe and effective for you.

## 2023-09-22 ENCOUNTER — OFFICE VISIT (OUTPATIENT)
Dept: INTERNAL MEDICINE CLINIC | Facility: CLINIC | Age: 53
End: 2023-09-22
Payer: COMMERCIAL

## 2023-09-22 VITALS
HEIGHT: 72 IN | DIASTOLIC BLOOD PRESSURE: 64 MMHG | BODY MASS INDEX: 37.11 KG/M2 | HEART RATE: 57 BPM | TEMPERATURE: 99.9 F | SYSTOLIC BLOOD PRESSURE: 124 MMHG | OXYGEN SATURATION: 96 % | WEIGHT: 274 LBS

## 2023-09-22 DIAGNOSIS — Z12.11 SCREEN FOR COLON CANCER: ICD-10-CM

## 2023-09-22 DIAGNOSIS — Z23 ENCOUNTER FOR VACCINATION: ICD-10-CM

## 2023-09-22 DIAGNOSIS — E11.9 TYPE 2 DIABETES MELLITUS WITHOUT COMPLICATION, WITHOUT LONG-TERM CURRENT USE OF INSULIN (HCC): Primary | ICD-10-CM

## 2023-09-22 LAB — SL AMB POCT HEMOGLOBIN AIC: 7 (ref ?–6.5)

## 2023-09-22 PROCEDURE — 90472 IMMUNIZATION ADMIN EACH ADD: CPT

## 2023-09-22 PROCEDURE — 99214 OFFICE O/P EST MOD 30 MIN: CPT | Performed by: INTERNAL MEDICINE

## 2023-09-22 PROCEDURE — 90471 IMMUNIZATION ADMIN: CPT | Performed by: INTERNAL MEDICINE

## 2023-09-22 PROCEDURE — 90686 IIV4 VACC NO PRSV 0.5 ML IM: CPT

## 2023-09-22 PROCEDURE — 83036 HEMOGLOBIN GLYCOSYLATED A1C: CPT | Performed by: INTERNAL MEDICINE

## 2023-09-22 PROCEDURE — 90686 IIV4 VACC NO PRSV 0.5 ML IM: CPT | Performed by: INTERNAL MEDICINE

## 2023-09-22 NOTE — PROGRESS NOTES
Assessment/Plan:  Problem List Items Addressed This Visit        Endocrine    Type 2 diabetes mellitus without complication ([address]) - Primary    Relevant Orders    POCT hemoglobin A1c (Completed)   Other Visit Diagnoses     Screen for colon cancer        Encounter for vaccination        Relevant Orders    influenza vaccine, quadrivalent, 0.5 mL, preservative-free           Diagnoses and all orders for this visit:    Type 2 diabetes mellitus without complication, without long-term current use of insulin (HCC)  -     POCT hemoglobin A1c    Screen for colon cancer    Encounter for vaccination  -     influenza vaccine, quadrivalent, 0.5 mL, preservative-free        No problem-specific Assessment & Plan notes found for this encounter. A/P: Doing ok and labs are up to date except for HgA1c, which was up slightly at 7.0. Unable to tolerate metformin. Defers adding insulin or changing rybelsus to mounjaro. Will get back on his diet. . Discussed vaccines will update his flu vaccine. . Continue current treatment and RTC three months for routine. Subjective:      Patient ID: Jose Martin Morin is a 48 y.o. male. WM RTC for f/u DM, HTN, etc. Doing ok and no new issues. Remains active w/o difficulty and no falls. Sugars less than 140 and no low sugar events. Chronic pain is manageable. Due for labs, CRC, and vaccines. The following portions of the patient's history were reviewed and updated as appropriate:   He has a past medical history of Diabetes mellitus ([address]), Hyperlipidemia, Hypertension, and Kidney stones. ,  does not have any pertinent problems on file. ,   has a past surgical history that includes Neck surgery; Cholecystectomy; Epidural block injection (Left, 6/3/2021); FL guided needle plac bx/asp/inj (6/3/2021); Epidural block injection (Left, 9/16/2021); FL guided needle plac bx/asp/inj (9/16/2021); and Epidural block injection (Left, 9/23/2022). ,  family history includes Breast cancer in his mother; Diabetes in his father, mother, and sister; Fibromyalgia in his sister; Hypertension in his mother. ,   reports that he has never smoked. He has never used smokeless tobacco. He reports that he does not drink alcohol and does not use drugs. ,  is allergic to codeine, lisinopril, metformin, neurontin [gabapentin], and penicillins. .  Current Outpatient Medications   Medication Sig Dispense Refill   • Ascorbic Acid (VITAMIN C) 1000 MG tablet Take 1,000 mg by mouth daily     • atorvastatin (LIPITOR) 20 mg tablet TAKE 1 TABLET BY MOUTH EVERY DAY 90 tablet 3   • Blood Glucose Monitoring Suppl ([employer] CONTOUR MONITOR) TRACEY by Does not apply route daily 1 Device 0   • Cholecalciferol (VITAMIN D3) 010489 UNIT/GM POWD Use 1 capsule daily      • Contour Next Test test strip CHECK BLOOD GLUCOSE TWICE A DAY 50 strip 3   • Empagliflozin (Jardiance) 10 MG TABS tablet Take 1 tablet (10 mg total) by mouth daily 90 tablet 3   • glimepiride (AMARYL) 4 mg tablet Take 2 tablets (8 mg total) by mouth daily 180 tablet 1   • metoprolol succinate (TOPROL-XL) 100 mg 24 hr tablet TAKE 1/2 TABLET BY MOUTH DAILY 45 tablet 3   • pregabalin (LYRICA) 50 mg capsule Take 1 capsule (50 mg total) by mouth 2 (two) times a day 60 capsule 2   • semaglutide (Rybelsus) 14 MG tablet Take 1 tablet (14 mg total) by mouth daily before breakfast 90 tablet 1     No current facility-administered medications for this visit. Review of Systems   Constitutional: Negative for activity change, chills, diaphoresis, fatigue and fever. HENT: Negative. Eyes: Negative for visual disturbance. Respiratory: Negative for cough, chest tightness, shortness of breath and wheezing. Cardiovascular: Negative for chest pain, palpitations and leg swelling. Gastrointestinal: Negative for abdominal pain, constipation, diarrhea, nausea and vomiting. Endocrine: Negative for cold intolerance and heat intolerance.    Genitourinary: Negative for difficulty urinating, dysuria and frequency. Musculoskeletal: Negative for arthralgias, gait problem and myalgias. Neurological: Negative for dizziness, seizures, syncope, weakness, light-headedness, numbness and headaches. Psychiatric/Behavioral: Negative for confusion, dysphoric mood and sleep disturbance. The patient is not nervous/anxious. PHQ-2/9 Depression Screening          Objective:  Vitals:    09/22/23 1445   BP: 124/64   BP Location: Left arm   Patient Position: Sitting   Pulse: 57   Temp: 99.9 °F (37.7 °C)   SpO2: 96%   Weight: 124 kg (274 lb)   Height: 6' (1.829 m)     Body mass index is 37.16 kg/m². Physical Exam  Vitals and nursing note reviewed. Constitutional:       General: He is not in acute distress. Appearance: Normal appearance. He is not ill-appearing. HENT:      Head: Normocephalic and atraumatic. Mouth/Throat:      Mouth: Mucous membranes are moist.   Eyes:      Extraocular Movements: Extraocular movements intact. Conjunctiva/sclera: Conjunctivae normal.      Pupils: Pupils are equal, round, and reactive to light. Neck:      Vascular: No carotid bruit. Cardiovascular:      Rate and Rhythm: Normal rate and regular rhythm. Heart sounds: Normal heart sounds. No murmur heard. Pulmonary:      Effort: Pulmonary effort is normal. No respiratory distress. Breath sounds: Normal breath sounds. No wheezing, rhonchi or rales. Abdominal:      General: Bowel sounds are normal. There is no distension. Palpations: Abdomen is soft. Tenderness: There is no abdominal tenderness. Musculoskeletal:      Cervical back: Neck supple. Right lower leg: No edema. Left lower leg: No edema. Neurological:      General: No focal deficit present. Mental Status: He is alert and oriented to person, place, and time. Mental status is at baseline. Psychiatric:         Mood and Affect: Mood normal.         Behavior: Behavior normal.         Thought Content:  Thought content normal.         Judgment: Judgment normal.

## 2023-10-09 DIAGNOSIS — E66.9 OBESITY (BMI 30-39.9): ICD-10-CM

## 2023-10-09 DIAGNOSIS — I10 ESSENTIAL HYPERTENSION: ICD-10-CM

## 2023-10-09 DIAGNOSIS — E11.9 TYPE 2 DIABETES MELLITUS WITHOUT COMPLICATION, WITHOUT LONG-TERM CURRENT USE OF INSULIN (HCC): ICD-10-CM

## 2023-11-04 DIAGNOSIS — E11.9 TYPE 2 DIABETES MELLITUS WITHOUT COMPLICATION, WITHOUT LONG-TERM CURRENT USE OF INSULIN (HCC): ICD-10-CM

## 2023-11-04 DIAGNOSIS — Z78.9 DRUG INTOLERANCE: ICD-10-CM

## 2023-11-04 DIAGNOSIS — I10 ESSENTIAL HYPERTENSION: ICD-10-CM

## 2023-11-04 RX ORDER — GLIMEPIRIDE 4 MG/1
8 TABLET ORAL DAILY
Qty: 180 TABLET | Refills: 1 | Status: SHIPPED | OUTPATIENT
Start: 2023-11-04

## 2023-11-30 ENCOUNTER — TELEMEDICINE (OUTPATIENT)
Dept: INTERNAL MEDICINE CLINIC | Facility: CLINIC | Age: 53
End: 2023-11-30
Payer: COMMERCIAL

## 2023-11-30 DIAGNOSIS — J40 SINOBRONCHITIS: Primary | ICD-10-CM

## 2023-11-30 DIAGNOSIS — J32.9 SINOBRONCHITIS: Primary | ICD-10-CM

## 2023-11-30 LAB
SARS-COV-2 AG UPPER RESP QL IA: POSITIVE
VALID CONTROL: ABNORMAL

## 2023-11-30 PROCEDURE — 99213 OFFICE O/P EST LOW 20 MIN: CPT | Performed by: INTERNAL MEDICINE

## 2023-11-30 RX ORDER — BENZONATATE 200 MG/1
200 CAPSULE ORAL 3 TIMES DAILY PRN
Qty: 20 CAPSULE | Refills: 0 | Status: SHIPPED | OUTPATIENT
Start: 2023-11-30

## 2023-11-30 RX ORDER — FLUTICASONE PROPIONATE 50 MCG
1 SPRAY, SUSPENSION (ML) NASAL DAILY
Qty: 16 G | Refills: 0 | Status: SHIPPED | OUTPATIENT
Start: 2023-11-30

## 2023-11-30 RX ORDER — GUAIFENESIN 600 MG/1
600 TABLET, EXTENDED RELEASE ORAL EVERY 12 HOURS SCHEDULED
Qty: 20 TABLET | Refills: 0 | Status: SHIPPED | OUTPATIENT
Start: 2023-11-30

## 2023-11-30 RX ORDER — AZITHROMYCIN 250 MG/1
TABLET, FILM COATED ORAL
Qty: 6 TABLET | Refills: 0 | Status: SHIPPED | OUTPATIENT
Start: 2023-11-30 | End: 2023-12-05

## 2023-11-30 NOTE — PATIENT INSTRUCTIONS
COVID-19 (Coronavirus Disease 2019)   WHAT YOU NEED TO KNOW:   COVID-19 is the disease caused by a coronavirus first discovered in December 2019. The virus can be spread starting 2 to 3 days before symptoms begin. The virus has changed into several new forms (called variants) since it was discovered. A variant may be more easily spread or cause more severe illness than the original form. DISCHARGE INSTRUCTIONS:   Call your local emergency number (911 in the [address]) if:   You have trouble breathing or shortness of breath at rest.    You have chest pain or pressure that lasts longer than 5 minutes. You become confused or hard to wake. Return to the emergency department if:   The skin on your face, fingers, or toes look blue or darker than usual.      Call your doctor if:   You have a fever. You have questions or concerns about your condition or care. Medicines: You may need any of the following:  Decongestants  help reduce nasal congestion and help you breathe more easily. If you take decongestant pills, they may make you feel restless or cause problems with your sleep. Do not use decongestant sprays for more than a few days. Cough suppressants  help reduce coughing. Ask your healthcare provider which type of cough medicine is best for you. Acetaminophen  decreases pain and fever. It is available without a doctor's order. Ask how much to take and how often to take it. Follow directions. Read the labels of all other medicines you are using to see if they also contain acetaminophen, or ask your doctor or pharmacist. Acetaminophen can cause liver damage if not taken correctly. NSAIDs , such as ibuprofen, help decrease swelling, pain, and fever. This medicine is available with or without a doctor's order. NSAIDs can cause stomach bleeding or kidney problems in certain people. If you take blood thinner medicine, always ask your healthcare provider if NSAIDs are safe for you.  Always read the medicine label and follow directions. Blood thinners  help prevent blood clots. Clots can cause strokes, heart attacks, and death. Many types of blood thinners are available. Your healthcare provider will give you specific instructions for the type you are given. The following are general safety guidelines to follow while you are taking a blood thinner:    Watch for bleeding and bruising. Watch for bleeding from your gums or nose. Watch for blood in your urine and bowel movements. Use a soft washcloth on your skin, and a soft toothbrush to brush your teeth. This can keep your skin and gums from bleeding. If you shave, use an electric shaver. Do not play contact sports. Tell your dentist and other healthcare providers that you take a blood thinner. Wear a bracelet or necklace that says you take this medicine. Do not start or stop any other medicines or supplements unless your healthcare provider tells you to. Many medicines and supplements cannot be used with blood thinners. Take your blood thinner exactly as prescribed by your healthcare provider. Do not skip does or take less than prescribed. Tell your provider right away if you forget to take your blood thinner, or if you take too much. Take your medicine as directed. Contact your healthcare provider if you think your medicine is not helping or if you have side effects. Tell your provider if you are allergic to any medicine. Keep a list of the medicines, vitamins, and herbs you take. Include the amounts, and when and why you take them. Bring the list or the pill bottles to follow-up visits. Carry your medicine list with you in case of an emergency. What you need to know about COVID-19 vaccines:  Healthcare providers recommend vaccination, even if you already had COVID-19. Get a COVID-19 vaccine as directed. Vaccination is recommended for everyone 6 months or older. COVID-19 vaccines are given as a shot in 1 to 3 doses as a primary series.  This depends on the vaccine brand and the age of the person who receives it. A booster dose is recommended for everyone 5 years or older after the primary series is complete. A second booster  is recommended for all adults 48 or older and for immunocompromised adolescents. The second booster is also recommended for anyone who got the 1-dose brand of vaccine for the first dose and a booster. Your provider can give you more information on boosters and help you schedule all needed doses. Continue to protect yourself and others. You can become infected even after you get the vaccine. You may also be able to pass the virus to others without knowing you are infected. After you get the vaccine, check local, national, and international travel rules. You may need to be tested before you travel. Some countries require proof of a negative test before you travel. You may also need to quarantine after you return. Medicine may be given to prevent infection. The medicine can be given if you are at high risk for infection and cannot get the vaccine. It can also be given if your immune system does not respond well to the vaccine. How the 2019 coronavirus spreads:  Close personal contact with an infected person increases your risk for infection. This means being within 6 feet (2 meters) of the person for at least 15 minutes over 24 hours. The virus travels in droplets that form when a person talks, sings, coughs, or sneezes. The droplets can also float in the air for minutes or hours. Infection happens when you breathe in the droplets or get them in your eyes or nose. Person-to-person contact can spread the virus. For example, a person with the virus on his or her hands can spread it by shaking hands with someone. The virus can stay on objects and surfaces for hours to days. You may become infected by touching the object or surface and then touching your eyes or mouth.     Help lower your risk for COVID-19 during an active outbreak:   Stay home if you are sick or think you may have COVID-19. It is important to stay home if you are waiting for a testing appointment or for test results. Wash your hands often throughout the day. Use soap and water. Rub your soapy hands together, lacing your fingers, for at least 20 seconds. Rinse with warm, running water. Dry your hands with a clean towel or paper towel. Use hand  that contains alcohol if soap and water are not available. Teach children how to wash their hands and use hand . Cover sneezes and coughs. Turn your face away and cover your mouth and nose with a tissue. Throw the tissue away. Use the bend of your arm if a tissue is not available. Then wash your hands well with soap and water or use hand . Teach children how to cover a cough or sneeze. Wear a face covering (mask) when needed. Use a cloth covering with at least 2 layers. You can also create layers by putting a cloth covering over a disposable non-medical mask. Cover your mouth and your nose. Try to keep space between you and others when you are out of the house. Avoid crowds as much as possible. Wear a face covering when you must be around a large group and cannot keep space between you and others. Clean and disinfect high-touch surfaces and objects often. Use disinfecting wipes, or make a solution of 4 teaspoons of bleach in 1 quart (4 cups) of water. Ask about other vaccines you may need. Get the influenza (flu) vaccine as soon as recommended each year, usually starting in September or October. Get the pneumonia vaccine if recommended. Your healthcare provider can tell you if you should also get other vaccines, and when to get them. Follow up with your doctor as directed:  Write down your questions so you remember to ask them during your visits.   For more information:   [facility]  [address]  [place] [address]  Phone: 1- 943 - [phone]  Web Address: [employer]    © Copyright [employer] 2023 Information is for End User's use only and may not be sold, redistributed or otherwise used for commercial purposes. The above information is an  only. It is not intended as medical advice for individual conditions or treatments. Talk to your doctor, nurse or pharmacist before following any medical regimen to see if it is safe and effective for you.

## 2023-11-30 NOTE — PROGRESS NOTES
COVID-19 Outpatient Progress Note    Assessment/Plan:    Problem List Items Addressed This Visit    None  Visit Diagnoses     Sinobronchitis    -  Primary    Relevant Medications    azithromycin (ZITHROMAX) 250 mg tablet    guaiFENesin (Mucinex) 600 mg 12 hr tablet    fluticasone (FLONASE) 50 mcg/act nasal spray    benzonatate (TESSALON) 200 MG capsule           Disposition:     Patient with asymptomatic/mild COVID-19: They were recommended to isolate for at least 5 days (day 0 is the day symptoms appeared or the date the specimen was collected for the positive test for people who are asymptomatic). If they are asymptomatic or symptoms are improving with no fevers in the past 24 hours, isolation may be ended followed by 5 days of wearing a high quality mask when around others to minimize risk of infecting others. They should wear a mask through day 10 and a test-based strategy may be used to remove a mask sooner. Discussed symptom directed medication options with patient. Discussed vitamin D, vitamin C, and/or zinc supplementation with patient. A/P: Pt clinically sounds stable and able to carry on a conversation w/o SOB. Could have COVID-19 and recommend he get a home test.. Suspect a sinusitis and will increase their PO fluids, OTC PRN motrin or tylenol, and will start abx, INS, and cough med. . To continue social distancing and other universal precautions. Pt to continue to keep ortiz for COVID-19. To call if covid screen is positive as he is high risk and may benefit from anti-viral.  RTC as scheduled.         I have spent a total time of 25 minutes on the day of the encounter for this patient including discussing diagnostic results, discussing prognosis, risks and benefits of treatment options, instructions for management, patient and family education, importance of treatment compliance, risk factor reductions, impressions, counseling/coordination of care, documenting in the medical record, reviewing/ordering tests, medicine, procedures and obtaining or reviewing history. Encounter provider: Juan Lowe DO     Provider located at: [address] [place] [id number]     Recent Visits  No visits were found meeting these conditions. Showing recent visits within past 7 days and meeting all other requirements  Today's Visits  Date Type Provider Dept   11/30/23 Telemedicine Juan Lowe DO [facility] [address] today's visits and meeting all other requirements  Future Appointments  No visits were found meeting these conditions. Showing future appointments within next 150 days and meeting all other requirements     This virtual check-in was done via [employer] and patient was informed that this is a secure, HIPAA-compliant platform. He agrees to proceed. Patient agrees to participate in a virtual check in via telephone or video visit instead of presenting to the office to address urgent/immediate medical needs. Patient is aware this is a billable service. He acknowledged consent and understanding of privacy and security of the video platform. The patient has agreed to participate and understands they can discontinue the visit at any time. After connecting through [facility], the patient was identified by name and date of birth. Sabracar Sánchez was informed that this was a telemedicine visit and that the exam was being conducted confidentially over secure lines. My office door was closed. No one else was in the room. Nataly Sánchez acknowledged consent and understanding of privacy and security of the telemedicine visit. I informed the patient that I have reviewed his record in Epic and presented the opportunity for him to ask any questions regarding the visit today. The patient agreed to participate.      Verification of patient location:  Patient is located in the following state in which I hold an active license: [place]    Subjective:   Norm Kruger is a 48 y.o. male who is concerned about COVID-19. Patient's symptoms include fever, chills, fatigue, nasal congestion, rhinorrhea, sore throat, cough, nausea, diarrhea and headache. Patient denies anosmia, loss of taste, shortness of breath, chest tightness, abdominal pain, vomiting and myalgias. - Date of symptom onset: 11/28/2023      COVID-19 vaccination status: Fully vaccinated (primary series)    No results found for: "Thedore Sames", "[address] [facility]", "[address] [facility]", "[facility]", "[address] [facility]", "[address] [facility]"    Review of Systems   Constitutional:  Positive for activity change, chills, diaphoresis, fatigue and fever. HENT:  Positive for congestion, postnasal drip, rhinorrhea, sinus pressure and sore throat. Negative for ear discharge, ear pain, facial swelling and sinus pain. No loss of taste/smell. Respiratory:  Positive for cough. Negative for chest tightness, shortness of breath and wheezing. Cardiovascular:  Negative for chest pain, palpitations and leg swelling. Gastrointestinal:  Positive for diarrhea and nausea. Negative for abdominal pain, constipation and vomiting. Genitourinary:  Negative for difficulty urinating, dysuria and frequency. Musculoskeletal:  Negative for arthralgias, gait problem and myalgias. Neurological:  Positive for headaches. Negative for light-headedness. Psychiatric/Behavioral:  Negative for confusion. The patient is not nervous/anxious.       Current Outpatient Medications on File Prior to Visit   Medication Sig   • Ascorbic Acid (VITAMIN C) 1000 MG tablet Take 1,000 mg by mouth daily   • atorvastatin (LIPITOR) 20 mg tablet TAKE 1 TABLET BY MOUTH EVERY DAY   • Blood Glucose Monitoring Suppl (IVY CONTOUR MONITOR) TRACEY by Does not apply route daily   • Cholecalciferol (VITAMIN D3) 760068 UNIT/GM POWD Use 1 capsule daily    • Contour Next Test test strip CHECK BLOOD GLUCOSE TWICE A DAY   • Empagliflozin (Jardiance) 10 MG TABS tablet Take 1 tablet (10 mg total) by mouth daily   • glimepiride (AMARYL) 4 mg tablet TAKE 2 TABLETS BY MOUTH DAILY. • metoprolol succinate (TOPROL-XL) 100 mg 24 hr tablet TAKE 1/2 TABLET BY MOUTH DAILY   • pregabalin (LYRICA) 50 mg capsule Take 1 capsule (50 mg total) by mouth 2 (two) times a day   • semaglutide (Rybelsus) 14 MG tablet Take 1 tablet (14 mg total) by mouth daily before breakfast       Objective: There were no vitals taken for this visit. Physical Exam  Vitals and nursing note reviewed. Constitutional:       General: He is not in acute distress. Appearance: Normal appearance. He is not ill-appearing. HENT:      Head: Normocephalic and atraumatic. Mouth/Throat:      Mouth: Mucous membranes are moist.   Eyes:      Extraocular Movements: Extraocular movements intact. Conjunctiva/sclera: Conjunctivae normal.      Pupils: Pupils are equal, round, and reactive to light. Pulmonary:      Effort: Pulmonary effort is normal. No respiratory distress. Neurological:      General: No focal deficit present. Mental Status: He is alert and oriented to person, place, and time. Mental status is at baseline. Psychiatric:         Mood and Affect: Mood normal.         Behavior: Behavior normal.         Thought Content: Thought content normal.         Judgment: Judgment normal.       Chava Bartlett DO  Scheduled Medication Review:  Pt's scheduled medication use was reviewed by myself/staff via the [employer] website. Pt's use has been found to be appropriate w/o any concerns for misuse by the patient. Pt's current conditions require continued scheduled medication use at this time. Future review for continued appropriate medication use and misuse will continue.

## 2023-12-01 ENCOUNTER — TELEMEDICINE (OUTPATIENT)
Dept: INTERNAL MEDICINE CLINIC | Facility: CLINIC | Age: 53
End: 2023-12-01
Payer: COMMERCIAL

## 2023-12-01 DIAGNOSIS — U07.1 LAB TEST POSITIVE FOR DETECTION OF COVID-19 VIRUS: Primary | ICD-10-CM

## 2023-12-01 PROCEDURE — 99213 OFFICE O/P EST LOW 20 MIN: CPT | Performed by: INTERNAL MEDICINE

## 2023-12-01 NOTE — PATIENT INSTRUCTIONS
COVID-19 (Coronavirus Disease 2019)   WHAT YOU NEED TO KNOW:   COVID-19 is the disease caused by a coronavirus first discovered in December 2019. The virus can be spread starting 2 to 3 days before symptoms begin. The virus has changed into several new forms (called variants) since it was discovered. A variant may be more easily spread or cause more severe illness than the original form. DISCHARGE INSTRUCTIONS:   Call your local emergency number (911 in the [address]) if:   You have trouble breathing or shortness of breath at rest.    You have chest pain or pressure that lasts longer than 5 minutes. You become confused or hard to wake. Return to the emergency department if:   The skin on your face, fingers, or toes look blue or darker than usual.      Call your doctor if:   You have a fever. You have questions or concerns about your condition or care. Medicines: You may need any of the following:  Decongestants  help reduce nasal congestion and help you breathe more easily. If you take decongestant pills, they may make you feel restless or cause problems with your sleep. Do not use decongestant sprays for more than a few days. Cough suppressants  help reduce coughing. Ask your healthcare provider which type of cough medicine is best for you. Acetaminophen  decreases pain and fever. It is available without a doctor's order. Ask how much to take and how often to take it. Follow directions. Read the labels of all other medicines you are using to see if they also contain acetaminophen, or ask your doctor or pharmacist. Acetaminophen can cause liver damage if not taken correctly. NSAIDs , such as ibuprofen, help decrease swelling, pain, and fever. This medicine is available with or without a doctor's order. NSAIDs can cause stomach bleeding or kidney problems in certain people. If you take blood thinner medicine, always ask your healthcare provider if NSAIDs are safe for you.  Always read the medicine label and follow directions. Blood thinners  help prevent blood clots. Clots can cause strokes, heart attacks, and death. Many types of blood thinners are available. Your healthcare provider will give you specific instructions for the type you are given. The following are general safety guidelines to follow while you are taking a blood thinner:    Watch for bleeding and bruising. Watch for bleeding from your gums or nose. Watch for blood in your urine and bowel movements. Use a soft washcloth on your skin, and a soft toothbrush to brush your teeth. This can keep your skin and gums from bleeding. If you shave, use an electric shaver. Do not play contact sports. Tell your dentist and other healthcare providers that you take a blood thinner. Wear a bracelet or necklace that says you take this medicine. Do not start or stop any other medicines or supplements unless your healthcare provider tells you to. Many medicines and supplements cannot be used with blood thinners. Take your blood thinner exactly as prescribed by your healthcare provider. Do not skip does or take less than prescribed. Tell your provider right away if you forget to take your blood thinner, or if you take too much. Take your medicine as directed. Contact your healthcare provider if you think your medicine is not helping or if you have side effects. Tell your provider if you are allergic to any medicine. Keep a list of the medicines, vitamins, and herbs you take. Include the amounts, and when and why you take them. Bring the list or the pill bottles to follow-up visits. Carry your medicine list with you in case of an emergency. What you need to know about COVID-19 vaccines:  Healthcare providers recommend vaccination, even if you already had COVID-19. Get a COVID-19 vaccine as directed. Vaccination is recommended for everyone 6 months or older. COVID-19 vaccines are given as a shot in 1 to 3 doses as a primary series.  This depends on the vaccine brand and the age of the person who receives it. A booster dose is recommended for everyone 5 years or older after the primary series is complete. A second booster  is recommended for all adults 48 or older and for immunocompromised adolescents. The second booster is also recommended for anyone who got the 1-dose brand of vaccine for the first dose and a booster. Your provider can give you more information on boosters and help you schedule all needed doses. Continue to protect yourself and others. You can become infected even after you get the vaccine. You may also be able to pass the virus to others without knowing you are infected. After you get the vaccine, check local, national, and international travel rules. You may need to be tested before you travel. Some countries require proof of a negative test before you travel. You may also need to quarantine after you return. Medicine may be given to prevent infection. The medicine can be given if you are at high risk for infection and cannot get the vaccine. It can also be given if your immune system does not respond well to the vaccine. How the 2019 coronavirus spreads:  Close personal contact with an infected person increases your risk for infection. This means being within 6 feet (2 meters) of the person for at least 15 minutes over 24 hours. The virus travels in droplets that form when a person talks, sings, coughs, or sneezes. The droplets can also float in the air for minutes or hours. Infection happens when you breathe in the droplets or get them in your eyes or nose. Person-to-person contact can spread the virus. For example, a person with the virus on his or her hands can spread it by shaking hands with someone. The virus can stay on objects and surfaces for hours to days. You may become infected by touching the object or surface and then touching your eyes or mouth.     Help lower your risk for COVID-19 during an active outbreak:   Stay home if you are sick or think you may have COVID-19. It is important to stay home if you are waiting for a testing appointment or for test results. Wash your hands often throughout the day. Use soap and water. Rub your soapy hands together, lacing your fingers, for at least 20 seconds. Rinse with warm, running water. Dry your hands with a clean towel or paper towel. Use hand  that contains alcohol if soap and water are not available. Teach children how to wash their hands and use hand . Cover sneezes and coughs. Turn your face away and cover your mouth and nose with a tissue. Throw the tissue away. Use the bend of your arm if a tissue is not available. Then wash your hands well with soap and water or use hand . Teach children how to cover a cough or sneeze. Wear a face covering (mask) when needed. Use a cloth covering with at least 2 layers. You can also create layers by putting a cloth covering over a disposable non-medical mask. Cover your mouth and your nose. Try to keep space between you and others when you are out of the house. Avoid crowds as much as possible. Wear a face covering when you must be around a large group and cannot keep space between you and others. Clean and disinfect high-touch surfaces and objects often. Use disinfecting wipes, or make a solution of 4 teaspoons of bleach in 1 quart (4 cups) of water. Ask about other vaccines you may need. Get the influenza (flu) vaccine as soon as recommended each year, usually starting in September or October. Get the pneumonia vaccine if recommended. Your healthcare provider can tell you if you should also get other vaccines, and when to get them. Follow up with your doctor as directed:  Write down your questions so you remember to ask them during your visits.   For more information:   [facility]  [address]  [place] [address]  Phone: 9- 645 - [phone]  Web Address: [employer]    © Copyright Merative 2023 Information is for End User's use only and may not be sold, redistributed or otherwise used for commercial purposes. The above information is an  only. It is not intended as medical advice for individual conditions or treatments. Talk to your doctor, nurse or pharmacist before following any medical regimen to see if it is safe and effective for you.

## 2023-12-01 NOTE — PROGRESS NOTES
COVID-19 Outpatient Progress Note    Assessment/Plan:    Problem List Items Addressed This Visit    None  Visit Diagnoses     Lab test positive for detection of COVID-19 virus    -  Primary         Disposition:     Patient with asymptomatic/mild COVID-19: They were recommended to isolate for at least 5 days (day 0 is the day symptoms appeared or the date the specimen was collected for the positive test for people who are asymptomatic). If they are asymptomatic or symptoms are improving with no fevers in the past 24 hours, isolation may be ended followed by 5 days of wearing a high quality mask when around others to minimize risk of infecting others. They should wear a mask through day 10 and a test-based strategy may be used to remove a mask sooner. Discussed symptom directed medication options with patient. Discussed vitamin D, vitamin C, and/or zinc supplementation with patient. A/P: Day # 3 since onset of COVID s/s. Covid test was finally performed and is positive. . Doing a little better. Slight fever. Nasal congestion and sore throat his biggest issues. No cough or SOB. Continue isolation/quarantine, increase fluids, PRN motrin/tylenol, and breathing exercises. Discussed paxlovid and pt is high risk. Now decided to not start it at this time. GFR is 100 and will need to stop his lipitor. Finish meds. RTC as scheduled for f/u. Patient meets criteria for Paxlovid and they have been counseled appropriately regarding risks, benefits, side effects, and alternative treatment options. After discussion, patient agrees to treatment. Possible side effects of Paxlovid? Possible side effects of Paxlovid are:  - Liver Problems. Notify us right away if you start to experience loss of appetite, yellowing of your skin and the whites of eyes (jaundice), dark-colored urine, pale colored stools and itchy skin, stomach area (abdominal) pain. - Resistance to HIV Medicines.  If you have untreated HIV infection, Paxlovid may lead to some HIV medicines not working as well in the future. - Other possible side effects include: altered sense of taste, diarrhea, high blood pressure, or muscle aches. I have spent a total time of 20 minutes on the day of the encounter for this patient including discussing diagnostic results, discussing prognosis, risks and benefits of treatment options, instructions for management, patient and family education, importance of treatment compliance, risk factor reductions, impressions, counseling/coordination of care, documenting in the medical record, reviewing/ordering tests, medicine, procedures and obtaining or reviewing history. Encounter provider: Itzel Souza DO     Provider located at: [address] [place] [address]  [address] [place] [id number]     Recent Visits  Date Type Provider Dept   11/30/23 Telemedicine Itzel Souza DO [facility] [address] recent visits within past 7 days and meeting all other requirements  Today's Visits  Date Type Provider Dept   12/01/23 Telemedicine Itzel Souza DO [facility] [address] today's visits and meeting all other requirements  Future Appointments  No visits were found meeting these conditions. Showing future appointments within next 150 days and meeting all other requirements     This virtual check-in was done via [employer] and patient was informed that this is a secure, HIPAA-compliant platform. He agrees to proceed. Patient agrees to participate in a virtual check in via telephone or video visit instead of presenting to the office to address urgent/immediate medical needs. Patient is aware this is a billable service. He acknowledged consent and understanding of privacy and security of the video platform. The patient has agreed to participate and understands they can discontinue the visit at any time.     After connecting through [facility], the patient was identified by name and date of birth. Mala Figueroa was informed that this was a telemedicine visit and that the exam was being conducted confidentially over secure lines. My office door was closed. No one else was in the room. Mala Figueroa acknowledged consent and understanding of privacy and security of the telemedicine visit. I informed the patient that I have reviewed his record in Epic and presented the opportunity for him to ask any questions regarding the visit today. The patient agreed to participate. Verification of patient location:  Patient is located in the following state in which I hold an active license: [place]    Subjective:   Mala Figueroa is a 48 y.o. male who has been screened for COVID-19. Symptom change since last report: improving. Patient's symptoms include fever, nasal congestion, rhinorrhea, sore throat and loss of taste. Patient denies chills, fatigue, anosmia, cough, shortness of breath, chest tightness, abdominal pain, nausea, vomiting, diarrhea, myalgias and headaches. - Date of symptom onset: 11/28/2023  - Date of positive COVID-19 test: 11/30/2023. Type of test: Home antigen. Patient with typical symptoms of COVID-19 and they attest that they were positive on home rapid antigen testing. Image of positive result is not able to be uploaded into their chart. COVID-19 vaccination status: Fully vaccinated (primary series)    Sheyla Colvin has been staying home and has isolated themselves in his home. He is taking care to not share personal items and is cleaning all surfaces that are touched often, like counters, tabletops, and doorknobs using household cleaning sprays or wipes. He is wearing a mask when he leaves his room. Lab Results   Component Value Date    SARSCOVAG Positive (A) 11/30/2023       Review of Systems   Constitutional:  Positive for activity change and fever. Negative for chills, diaphoresis and fatigue.    HENT:  Positive for congestion, postnasal drip, rhinorrhea and sore throat. Negative for ear discharge, ear pain, facial swelling, sinus pressure and sinus pain. Loss of taste/smell. Respiratory:  Negative for cough, chest tightness, shortness of breath and wheezing. Cardiovascular:  Negative for chest pain, palpitations and leg swelling. Gastrointestinal:  Negative for abdominal pain, constipation, diarrhea, nausea and vomiting. Genitourinary:  Negative for difficulty urinating, dysuria and frequency. Musculoskeletal:  Negative for arthralgias, gait problem and myalgias. Neurological:  Negative for light-headedness and headaches. Psychiatric/Behavioral:  Negative for confusion. The patient is not nervous/anxious. Current Outpatient Medications on File Prior to Visit   Medication Sig   • Ascorbic Acid (VITAMIN C) 1000 MG tablet Take 1,000 mg by mouth daily   • atorvastatin (LIPITOR) 20 mg tablet TAKE 1 TABLET BY MOUTH EVERY DAY   • azithromycin (ZITHROMAX) 250 mg tablet Take 2 tablets today then 1 tablet daily x 4 days   • benzonatate (TESSALON) 200 MG capsule Take 1 capsule (200 mg total) by mouth 3 (three) times a day as needed for cough   • Blood Glucose Monitoring Suppl ([employer] CONTOUR MONITOR) TRACEY by Does not apply route daily   • Cholecalciferol (VITAMIN D3) 039307 UNIT/GM POWD Use 1 capsule daily    • Contour Next Test test strip CHECK BLOOD GLUCOSE TWICE A DAY   • Empagliflozin (Jardiance) 10 MG TABS tablet Take 1 tablet (10 mg total) by mouth daily   • fluticasone (FLONASE) 50 mcg/act nasal spray 1 spray into each nostril daily   • glimepiride (AMARYL) 4 mg tablet TAKE 2 TABLETS BY MOUTH DAILY.    • guaiFENesin (Mucinex) 600 mg 12 hr tablet Take 1 tablet (600 mg total) by mouth every 12 (twelve) hours   • metoprolol succinate (TOPROL-XL) 100 mg 24 hr tablet TAKE 1/2 TABLET BY MOUTH DAILY   • pregabalin (LYRICA) 50 mg capsule Take 1 capsule (50 mg total) by mouth 2 (two) times a day   • semaglutide (Rybelsus) 14 MG tablet Take 1 tablet (14 mg total) by mouth daily before breakfast       Objective: There were no vitals taken for this visit. Physical Exam  Vitals and nursing note reviewed. Constitutional:       General: He is not in acute distress. Appearance: Normal appearance. He is not ill-appearing. HENT:      Head: Normocephalic and atraumatic. Mouth/Throat:      Mouth: Mucous membranes are moist.   Eyes:      Extraocular Movements: Extraocular movements intact. Conjunctiva/sclera: Conjunctivae normal.      Pupils: Pupils are equal, round, and reactive to light. Pulmonary:      Effort: Pulmonary effort is normal. No respiratory distress. Neurological:      General: No focal deficit present. Mental Status: He is alert and oriented to person, place, and time. Mental status is at baseline. Psychiatric:         Mood and Affect: Mood normal.         Behavior: Behavior normal.         Thought Content:  Thought content normal.         Judgment: Judgment normal.       Itzel Souza DO

## 2023-12-12 DIAGNOSIS — Z12.12 ENCOUNTER FOR SCREENING FOR COLORECTAL MALIGNANT NEOPLASM: Primary | ICD-10-CM

## 2023-12-12 DIAGNOSIS — Z12.11 ENCOUNTER FOR SCREENING FOR COLORECTAL MALIGNANT NEOPLASM: Primary | ICD-10-CM

## 2023-12-12 NOTE — PATIENT INSTRUCTIONS
Basic Carbohydrate Counting   AMBULATORY CARE:   Carbohydrate counting  is a way to plan your meals by counting the amount of carbohydrate in foods. Carbohydrates are the sugars, starches, and fiber found in fruit, grains, vegetables, and milk products. Carbohydrates increase your blood sugar levels. Carbohydrate counting can help you eat the right amount of carbohydrate to keep your blood sugar levels under control.   What you need to know about planning meals using carbohydrate counting:  A dietitian or healthcare provider will help you develop a healthy meal plan that works best for you. You will be taught how much carbohydrate to eat or drink for each meal and snack. Your meal plan will be based on your age, weight, usual food intake, and physical activity level. If you have diabetes, it will also include your blood sugar levels and diabetes medicine. Once you know how much carbohydrate you should eat, you can decide what type of food you want to eat.    You will need to know what foods contain carbohydrate and how much they contain. Keep track of the amount of carbohydrate in meals and snacks in order to follow your meal plan. Do not avoid carbohydrates or skip meals. Your blood sugar may fall too low if you do not eat enough carbohydrate or you skip meals.    Foods that contain carbohydrate:   Breads:  Each serving of food listed below contains about 15 g of carbohydrate .    1 slice of bread (1 ounce) or 1 flour or corn tortilla (6 inch)    ½ of a hamburger bun or ¼ of a large bagel (about 1 ounce)    1 pancake (about 4 inches across and ¼ inch thick)    Cereals and grains:  Serving sizes of ready-to-eat cereals vary. Look at the serving size and the total carbohydrate amount listed on the food label. Each serving of food listed below contains about 15 g of carbohydrate .    ¾ cup of dry, unsweetened, ready-to-eat cereal or ¼ cup of low-fat granola     ½ cup of oatmeal or other cooked cereal     ? cup of  cooked rice or pasta    Starchy vegetables and beans:  Each serving of food listed below contains about 15 g of carbohydrate .    ½ cup of corn, green peas, sweet potatoes, or mashed potatoes    ¼ of a large baked potato    ½ cup of beans, lentils, and peas (garbanzo, mcginnis, kidney, white, split, black-eyed)    Crackers and snacks:  Each serving of food listed below contains about 15 g of carbohydrate .    3 larry cracker squares or 8 animal crackers     6 saltine-type crackers    3 cups of popcorn or ¾ ounce of pretzels, potato chips, or tortilla chips    Fruit:  Each serving of food listed below contains about 15 g of carbohydrate .    1 small (4 ounce) piece of fresh fruit or ¾ to 1 cup of fresh fruit    ½ cup of canned or frozen fruit, packed in natural juice    ½ cup (4 ounces) of unsweetened fruit juice    2 tablespoons of dried fruit    Desserts or sugary foods:  Each serving of food listed below contains about 15 g of carbohydrate .    2-inch square unfrosted cake or brownie     2 small cookies    ½ cup of ice cream, frozen yogurt, or nondairy frozen yogurt    ¼ cup of sherbet or sorbet    1 tablespoon of regular syrup, jam, or jelly    2 tablespoons of light syrup    Milk and yogurt:  Foods from the milk group contain about 12 g of carbohydrate per serving.    1 cup of fat-free or low-fat milk    1 cup of soy milk    ? cup of fat-free, yogurt sweetened with artificial sweetener    Non-starchy vegetables:  Each serving contains about 5 g of carbohydrate . Three servings of non-starch vegetables count as 1 carbohydrate serving.     ½ cup of cooked vegetables or 1 cup of raw vegetables. This includes beets, broccoli, cabbage, cauliflower, cucumber, mushrooms, tomatoes, and zucchini    ½ cup of vegetable juice    How to use carbohydrate counting to plan meals:   Count carbohydrate amounts using serving sizes:      Pasta dinner example:  You plan to have pasta, tossed salad, and an 8-ounce glass of milk. Your  healthcare provider tells you that you may have 4 carbohydrate servings for dinner. One carbohydrate serving of pasta is ? cup. One cup of pasta will equal 3 carbohydrate servings. An 8-ounce glass of milk will count as 1 carbohydrate serving. These amounts of food would equal 4 carbohydrate servings. One cup of tossed salad does not count toward your carbohydrate servings.       Count carbohydrate amounts using food labels:  Find the total amount of carbohydrate in a packaged food by reading the food label. Food labels tell you the serving size of the food and the total carbohydrate amount in each serving. Find the serving size on the food label and then decide how many servings you will eat. Multiply the number of servings you plan to eat by the carbohydrate amount per serving.     Granola bar snack example:  Your meal plan allows you to have 2 carbohydrate servings (30 grams) of carbohydrate for a snack. You plan to eat 1 package of granola bars, which contains 2 bars. According to the food label, the serving size of food in this package is 1 bar. Each serving (1 bar) contains 25 grams of carbohydrate. The total amount of carbohydrate in this package of granola bars would be 50 g. Based on your meal plan, you should eat only 1 bar.      Follow up with your doctor as directed:  Write down your questions so you remember to ask them during your visits.  © Copyright [employer] 2023 Information is for End User's use only and may not be sold, redistributed or otherwise used for commercial purposes.  The above information is an  only. It is not intended as medical advice for individual conditions or treatments. Talk to your doctor, nurse or pharmacist before following any medical regimen to see if it is safe and effective for you.

## 2023-12-20 ENCOUNTER — OFFICE VISIT (OUTPATIENT)
Dept: INTERNAL MEDICINE CLINIC | Facility: CLINIC | Age: 53
End: 2023-12-20
Payer: COMMERCIAL

## 2023-12-20 VITALS
WEIGHT: 270 LBS | HEART RATE: 99 BPM | SYSTOLIC BLOOD PRESSURE: 130 MMHG | TEMPERATURE: 98.9 F | DIASTOLIC BLOOD PRESSURE: 80 MMHG | OXYGEN SATURATION: 99 % | BODY MASS INDEX: 36.57 KG/M2 | HEIGHT: 72 IN

## 2023-12-20 DIAGNOSIS — E11.9 ENCOUNTER FOR DIABETIC FOOT EXAM (HCC): ICD-10-CM

## 2023-12-20 DIAGNOSIS — E78.2 MIXED HYPERLIPIDEMIA: ICD-10-CM

## 2023-12-20 DIAGNOSIS — Z12.5 SCREENING FOR PROSTATE CANCER: ICD-10-CM

## 2023-12-20 DIAGNOSIS — E11.9 TYPE 2 DIABETES MELLITUS WITHOUT COMPLICATION, WITHOUT LONG-TERM CURRENT USE OF INSULIN (HCC): Primary | ICD-10-CM

## 2023-12-20 DIAGNOSIS — Z13.1 SCREENING FOR DIABETES MELLITUS (DM): ICD-10-CM

## 2023-12-20 DIAGNOSIS — Z13.29 SCREENING FOR THYROID DISORDER: ICD-10-CM

## 2023-12-20 DIAGNOSIS — Z23 ENCOUNTER FOR VACCINATION: ICD-10-CM

## 2023-12-20 DIAGNOSIS — Z13.220 SCREENING FOR LIPID DISORDERS: ICD-10-CM

## 2023-12-20 DIAGNOSIS — Z13.0 SCREENING FOR DEFICIENCY ANEMIA: ICD-10-CM

## 2023-12-20 DIAGNOSIS — D69.6 THROMBOCYTOPENIA (HCC): ICD-10-CM

## 2023-12-20 DIAGNOSIS — G89.4 CHRONIC PAIN SYNDROME: ICD-10-CM

## 2023-12-20 DIAGNOSIS — I10 ESSENTIAL HYPERTENSION: ICD-10-CM

## 2023-12-20 LAB — SL AMB POCT HEMOGLOBIN AIC: 7.2 (ref ?–6.5)

## 2023-12-20 PROCEDURE — 83036 HEMOGLOBIN GLYCOSYLATED A1C: CPT | Performed by: INTERNAL MEDICINE

## 2023-12-20 PROCEDURE — 99214 OFFICE O/P EST MOD 30 MIN: CPT | Performed by: INTERNAL MEDICINE

## 2023-12-20 NOTE — PROGRESS NOTES
Assessment/Plan:  Problem List Items Addressed This Visit        Endocrine    Type 2 diabetes mellitus without complication (HCC) - Primary    Relevant Orders    POCT hemoglobin A1c (Completed)    Comprehensive metabolic panel    CBC and differential    Lipid Panel with Direct LDL reflex    TSH, 3rd generation    Albumin / creatinine urine ratio       Cardiovascular and Mediastinum    Essential hypertension    Relevant Orders    Comprehensive metabolic panel    Albumin / creatinine urine ratio       Hematopoietic and Hemostatic    Thrombocytopenia (HCC)       Other    Mixed hyperlipidemia    Relevant Orders    Comprehensive metabolic panel    Lipid Panel with Direct LDL reflex    Chronic pain syndrome   Other Visit Diagnoses     Encounter for diabetic foot exam (HCC)        Encounter for vaccination        Screening for prostate cancer        Relevant Orders    PSA, Total Screen    Screening for lipid disorders        Screening for thyroid disorder        Relevant Orders    TSH, 3rd generation    Screening for diabetes mellitus (DM)        Relevant Orders    POCT hemoglobin A1c (Completed)    Screening for deficiency anemia        Relevant Orders    CBC and differential           Diagnoses and all orders for this visit:    Type 2 diabetes mellitus without complication, without long-term current use of insulin (HCC)  -     POCT hemoglobin A1c  -     Comprehensive metabolic panel; Future  -     CBC and differential; Future  -     Lipid Panel with Direct LDL reflex; Future  -     TSH, 3rd generation; Future  -     Albumin / creatinine urine ratio    Essential hypertension  -     Comprehensive metabolic panel; Future  -     Albumin / creatinine urine ratio    Thrombocytopenia (HCC)    Mixed hyperlipidemia  -     Comprehensive metabolic panel; Future  -     Lipid Panel with Direct LDL reflex; Future    Chronic pain syndrome    Encounter for diabetic foot exam (HCC)    Encounter for vaccination    Screening for prostate  cancer  -     PSA, Total Screen; Future    Screening for lipid disorders    Screening for thyroid disorder  -     TSH, 3rd generation; Future    Screening for diabetes mellitus (DM)  -     POCT hemoglobin A1c    Screening for deficiency anemia  -     CBC and differential; Future        No problem-specific Assessment & Plan notes found for this encounter.    A/P: DOing ok and will order labs. In office HgA1c was up at 7.2. Discussed going on basal insulin and defers. Discussed vaccines already had his flu. . Continue current treatment otherwise and RTC three months for routine.    Subjective:      Patient ID: Robe Salas is a 53 y.o. male.    WM RTC For f/u DM, HTN, etc. Doing ok and no new issues. Remains active w/o difficulty and no falls. Sugars less than 150 and no low sugar events. Chronic pain is manageable. Due for labs and vaccines.         The following portions of the patient's history were reviewed and updated as appropriate:   He has a past medical history of Diabetes mellitus (HCC), Hyperlipidemia, Hypertension, and Kidney stones.,  does not have any pertinent problems on file.,   has a past surgical history that includes Neck surgery; Cholecystectomy; Epidural block injection (Left, 6/3/2021); FL guided needle plac bx/asp/inj (6/3/2021); Epidural block injection (Left, 9/16/2021); FL guided needle plac bx/asp/inj (9/16/2021); and Epidural block injection (Left, 9/23/2022).,  family history includes Breast cancer in his mother; Diabetes in his father, mother, and sister; Fibromyalgia in his sister; Hypertension in his mother.,   reports that he has never smoked. He has never used smokeless tobacco. He reports that he does not drink alcohol and does not use drugs.,  is allergic to codeine, lisinopril, metformin, neurontin [gabapentin], and penicillins..  Current Outpatient Medications   Medication Sig Dispense Refill   • Ascorbic Acid (VITAMIN C) 1000 MG tablet Take 1,000 mg by mouth daily     •  atorvastatin (LIPITOR) 20 mg tablet TAKE 1 TABLET BY MOUTH EVERY DAY 90 tablet 3   • benzonatate (TESSALON) 200 MG capsule Take 1 capsule (200 mg total) by mouth 3 (three) times a day as needed for cough 20 capsule 0   • Blood Glucose Monitoring Suppl ([employer] CONTOUR MONITOR) TRACEY by Does not apply route daily 1 Device 0   • Cholecalciferol (VITAMIN D3) 739759 UNIT/GM POWD Use 1 capsule daily      • Contour Next Test test strip CHECK BLOOD GLUCOSE TWICE A DAY 50 strip 3   • Empagliflozin (Jardiance) 10 MG TABS tablet Take 1 tablet (10 mg total) by mouth daily 90 tablet 3   • fluticasone (FLONASE) 50 mcg/act nasal spray 1 spray into each nostril daily 16 g 0   • glimepiride (AMARYL) 4 mg tablet TAKE 2 TABLETS BY MOUTH DAILY. 180 tablet 1   • guaiFENesin (Mucinex) 600 mg 12 hr tablet Take 1 tablet (600 mg total) by mouth every 12 (twelve) hours 20 tablet 0   • metoprolol succinate (TOPROL-XL) 100 mg 24 hr tablet TAKE 1/2 TABLET BY MOUTH DAILY 45 tablet 3   • pregabalin (LYRICA) 50 mg capsule Take 1 capsule (50 mg total) by mouth 2 (two) times a day 60 capsule 2   • semaglutide (Rybelsus) 14 MG tablet Take 1 tablet (14 mg total) by mouth daily before breakfast 90 tablet 1     No current facility-administered medications for this visit.       Review of Systems   Constitutional:  Negative for activity change, chills, diaphoresis, fatigue and fever.   HENT: Negative.     Eyes:  Negative for visual disturbance.   Respiratory:  Negative for cough, chest tightness, shortness of breath and wheezing.    Cardiovascular:  Negative for chest pain, palpitations and leg swelling.   Gastrointestinal:  Negative for abdominal pain, constipation, diarrhea, nausea and vomiting.   Endocrine: Negative for cold intolerance and heat intolerance.   Genitourinary:  Negative for difficulty urinating, dysuria and frequency.   Musculoskeletal:  Negative for arthralgias, gait problem and myalgias.   Neurological:  Negative for dizziness, seizures,  syncope, weakness, light-headedness and headaches.   Psychiatric/Behavioral:  Negative for confusion, dysphoric mood and sleep disturbance. The patient is not nervous/anxious.        PHQ-2/9 Depression Screening          Objective:  Vitals:    12/20/23 1127   BP: 130/80   Pulse: 99   Temp: 98.9 °F (37.2 °C)   SpO2: 99%   Weight: 122 kg (270 lb)   Height: 6' (1.829 m)     Body mass index is 36.62 kg/m².     Physical Exam  Vitals and nursing note reviewed.   Constitutional:       General: He is not in acute distress.     Appearance: Normal appearance. He is not ill-appearing.   HENT:      Head: Normocephalic and atraumatic.      Mouth/Throat:      Mouth: Mucous membranes are moist.   Eyes:      Extraocular Movements: Extraocular movements intact.      Conjunctiva/sclera: Conjunctivae normal.      Pupils: Pupils are equal, round, and reactive to light.   Cardiovascular:      Rate and Rhythm: Normal rate and regular rhythm.      Pulses: no weak pulses          Dorsalis pedis pulses are 1+ on the right side and 1+ on the left side.        Posterior tibial pulses are 1+ on the right side and 1+ on the left side.      Heart sounds: Normal heart sounds. No murmur heard.  Pulmonary:      Effort: Pulmonary effort is normal. No respiratory distress.      Breath sounds: Normal breath sounds. No wheezing, rhonchi or rales.   Abdominal:      General: Bowel sounds are normal. There is no distension.      Palpations: Abdomen is soft.      Tenderness: There is no abdominal tenderness.   Musculoskeletal:      Cervical back: Neck supple.      Right lower leg: No edema.      Left lower leg: No edema.   Feet:      Right foot:      Skin integrity: No ulcer, skin breakdown, erythema, warmth, callus or dry skin.      Left foot:      Skin integrity: No ulcer, skin breakdown, erythema, warmth, callus or dry skin.   Neurological:      General: No focal deficit present.      Mental Status: He is alert and oriented to person, place, and time.  Mental status is at baseline.   Psychiatric:         Mood and Affect: Mood normal.         Behavior: Behavior normal.         Thought Content: Thought content normal.         Judgment: Judgment normal.           Patient's shoes and socks removed.    Right Foot/Ankle   Right Foot Inspection  Skin Exam: skin normal and skin intact. No dry skin, no warmth, no callus, no erythema, no maceration, no abnormal color, no pre-ulcer, no ulcer and no callus.     Toe Exam: ROM and strength within normal limits. No swelling, no tenderness, erythema and  no right toe deformity    Sensory   Monofilament testing: intact    Vascular  Capillary refills: < 3 seconds  The right DP pulse is 1+. The right PT pulse is 1+.     Left Foot/Ankle  Left Foot Inspection  Skin Exam: skin normal and skin intact. No dry skin, no warmth, no erythema, no maceration, normal color, no pre-ulcer, no ulcer and no callus.     Toe Exam: ROM and strength within normal limits. No swelling, no tenderness, no erythema and no left toe deformity.     Sensory   Monofilament testing: intact    Vascular  Capillary refills: < 3 seconds  The left DP pulse is 1+. The left PT pulse is 1+.     Assign Risk Category  No deformity present  No loss of protective sensation  No weak pulses  Risk: 0

## 2023-12-22 ENCOUNTER — OFFICE VISIT (OUTPATIENT)
Dept: PAIN MEDICINE | Facility: CLINIC | Age: 53
End: 2023-12-22
Payer: COMMERCIAL

## 2023-12-22 VITALS
WEIGHT: 270 LBS | HEART RATE: 63 BPM | DIASTOLIC BLOOD PRESSURE: 76 MMHG | HEIGHT: 72 IN | BODY MASS INDEX: 36.57 KG/M2 | SYSTOLIC BLOOD PRESSURE: 124 MMHG

## 2023-12-22 DIAGNOSIS — G89.4 CHRONIC PAIN SYNDROME: Primary | ICD-10-CM

## 2023-12-22 DIAGNOSIS — M54.50 CHRONIC BILATERAL LOW BACK PAIN WITHOUT SCIATICA: ICD-10-CM

## 2023-12-22 DIAGNOSIS — M54.16 LUMBAR RADICULOPATHY: ICD-10-CM

## 2023-12-22 DIAGNOSIS — M79.18 MYOFASCIAL PAIN SYNDROME: ICD-10-CM

## 2023-12-22 DIAGNOSIS — G89.29 CHRONIC BILATERAL LOW BACK PAIN WITHOUT SCIATICA: ICD-10-CM

## 2023-12-22 PROCEDURE — 99214 OFFICE O/P EST MOD 30 MIN: CPT | Performed by: NURSE PRACTITIONER

## 2023-12-22 RX ORDER — PREGABALIN 50 MG/1
50 CAPSULE ORAL 2 TIMES DAILY
Qty: 180 CAPSULE | Refills: 1 | Status: SHIPPED | OUTPATIENT
Start: 2023-12-22

## 2023-12-22 NOTE — PROGRESS NOTES
Assessment:  1. Chronic pain syndrome    2. Chronic bilateral low back pain without sciatica    3. Lumbar radiculopathy    4. Myofascial pain syndrome        Plan:  While the patient was in the office today, I did have a thorough conversation regarding their chronic pain syndrome, medication management, and treatment plan options.  Patient is being seen for a follow-up visit.  Patient reports that his pain is very well-controlled with the use of Lyrica 50 mg twice daily.  This medication reduces pain by about 90%.  He denies side effects from this medication.    Patient previously underwent a left-sided L3-4 transforaminal epidural steroid injection on 9/23/2022.  His pain has been very tolerable since.  We will repeat the left-sided L3-4 transforaminal epidural steroid injection as needed.    Follow-up in 3 months.      History of Present Illness:  The patient is a 53 y.o. male who presents for a follow up office visit in regards to Back Pain and Leg Pain.   The patient’s current symptoms include complaints of low back and left leg pain.  Current pain level is a 1/10.  Quality pain is described as pressure-like.    Current pain medications includes: Lyrica 50 mg twice daily.  The patient reports that this regimen is providing 90% pain relief.  The patient is reporting no side effects from this pain medication regimen.    I have personally reviewed and/or updated the patient's past medical history, past surgical history, family history, social history, current medications, allergies, and vital signs today.         Review of Systems  Review of Systems   Constitutional:  Negative for unexpected weight change.   HENT:  Negative for hearing loss.    Eyes:  Negative for visual disturbance.   Respiratory:  Negative for shortness of breath.    Cardiovascular:  Negative for leg swelling.   Gastrointestinal:  Negative for constipation.   Endocrine: Negative for polyuria.   Genitourinary:  Negative for difficulty urinating.    Musculoskeletal:  Positive for arthralgias, gait problem and myalgias. Negative for joint swelling.        Decreased range of motion   Skin:  Negative for rash.   Neurological:  Negative for weakness and headaches.   Psychiatric/Behavioral:  Negative for decreased concentration.    All other systems reviewed and are negative.        Past Medical History:   Diagnosis Date    Diabetes mellitus (HCC)     Hyperlipidemia     Hypertension     Kidney stones        Past Surgical History:   Procedure Laterality Date    CHOLECYSTECTOMY      EPIDURAL BLOCK INJECTION Left 6/3/2021    Procedure: Left L3-4 transforaminal epidural steroid injection;  Surgeon: Ari Celaya MD;  Location: [facility];  Service: Pain Management     EPIDURAL BLOCK INJECTION Left 9/16/2021    Procedure: L3-4 TRANSFORAMINAL EPIDURAL STERIOD INJECTION;  Surgeon: Ari Celaya MD;  Location: [facility];  Service: Pain Management     EPIDURAL BLOCK INJECTION Left 9/23/2022    Procedure: BLOCK / INJECTION EPIDURAL STEROID LUMBAR  left L3-4 TFESI;  Surgeon: Ari Celaya MD;  Location: [facility];  Service: Pain Management     FL GUIDED NEEDLE PLAC BX/ASP/INJ  6/3/2021    FL GUIDED NEEDLE PLAC BX/ASP/INJ  9/16/2021    NECK SURGERY         Family History   Problem Relation Age of Onset    Hypertension Mother     Diabetes Mother     Breast cancer Mother     Diabetes Father     Diabetes Sister     Fibromyalgia Sister        Social History     Occupational History    Occupation: EMPLOYED   Tobacco Use    Smoking status: Never    Smokeless tobacco: Never   Vaping Use    Vaping status: Never Used   Substance and Sexual Activity    Alcohol use: Never    Drug use: Never    Sexual activity: Not Currently         Current Outpatient Medications:     Ascorbic Acid (VITAMIN C) 1000 MG tablet, Take 1,000 mg by mouth daily, Disp: , Rfl:     atorvastatin (LIPITOR) 20 mg tablet, TAKE 1 TABLET BY MOUTH EVERY DAY, Disp: 90 tablet, Rfl: 3    benzonatate  (TESSALON) 200 MG capsule, Take 1 capsule (200 mg total) by mouth 3 (three) times a day as needed for cough, Disp: 20 capsule, Rfl: 0    Blood Glucose Monitoring Suppl ([employer] CONTOUR MONITOR) TRACEY, by Does not apply route daily, Disp: 1 Device, Rfl: 0    Cholecalciferol (VITAMIN D3) 784368 UNIT/GM POWD, Use 1 capsule daily , Disp: , Rfl:     Contour Next Test test strip, CHECK BLOOD GLUCOSE TWICE A DAY, Disp: 50 strip, Rfl: 3    Empagliflozin (Jardiance) 10 MG TABS tablet, Take 1 tablet (10 mg total) by mouth daily, Disp: 90 tablet, Rfl: 3    fluticasone (FLONASE) 50 mcg/act nasal spray, 1 spray into each nostril daily, Disp: 16 g, Rfl: 0    glimepiride (AMARYL) 4 mg tablet, TAKE 2 TABLETS BY MOUTH DAILY., Disp: 180 tablet, Rfl: 1    guaiFENesin (Mucinex) 600 mg 12 hr tablet, Take 1 tablet (600 mg total) by mouth every 12 (twelve) hours, Disp: 20 tablet, Rfl: 0    metoprolol succinate (TOPROL-XL) 100 mg 24 hr tablet, TAKE 1/2 TABLET BY MOUTH DAILY, Disp: 45 tablet, Rfl: 3    pregabalin (LYRICA) 50 mg capsule, Take 1 capsule (50 mg total) by mouth 2 (two) times a day, Disp: 180 capsule, Rfl: 1    semaglutide (Rybelsus) 14 MG tablet, Take 1 tablet (14 mg total) by mouth daily before breakfast, Disp: 90 tablet, Rfl: 1    Allergies   Allergen Reactions    Codeine Nausea Only    Lisinopril      swelling    Metformin      GI    Neurontin [Gabapentin] Hives    Penicillins Hives       Physical Exam:    /76   Pulse 63   Ht 6' (1.829 m)   Wt 122 kg (270 lb)   BMI 36.62 kg/m²     Constitutional:normal, well developed, well nourished, alert, in no distress and non-toxic and no overt pain behavior.  Eyes:anicteric  HEENT:grossly intact  Neck:supple, symmetric, trachea midline and no masses   Pulmonary:even and unlabored  Cardiovascular:No edema or pitting edema present  Skin:Normal without rashes or lesions and well hydrated  Psychiatric:Mood and affect appropriate  Neurologic:Cranial Nerves II-XII grossly  intact  Musculoskeletal:normal    Imaging  No orders to display       No orders of the defined types were placed in this encounter.

## 2024-06-13 ENCOUNTER — TELEPHONE (OUTPATIENT)
Age: 54
End: 2024-06-13

## 2024-08-03 DIAGNOSIS — E78.2 MIXED HYPERLIPIDEMIA: ICD-10-CM

## 2024-08-03 DIAGNOSIS — E11.9 TYPE 2 DIABETES MELLITUS WITHOUT COMPLICATION, WITHOUT LONG-TERM CURRENT USE OF INSULIN (HCC): ICD-10-CM

## 2024-08-04 RX ORDER — ATORVASTATIN CALCIUM 20 MG/1
TABLET, FILM COATED ORAL
Qty: 30 TABLET | Refills: 0 | Status: SHIPPED | OUTPATIENT
Start: 2024-08-04

## 2024-08-18 DIAGNOSIS — E78.2 MIXED HYPERLIPIDEMIA: ICD-10-CM

## 2024-08-18 DIAGNOSIS — E11.9 TYPE 2 DIABETES MELLITUS WITHOUT COMPLICATION, WITHOUT LONG-TERM CURRENT USE OF INSULIN (HCC): ICD-10-CM

## 2024-08-19 RX ORDER — ATORVASTATIN CALCIUM 20 MG/1
20 TABLET, FILM COATED ORAL DAILY
Qty: 30 TABLET | Refills: 0 | Status: SHIPPED | OUTPATIENT
Start: 2024-08-19

## 2024-09-03 DIAGNOSIS — E11.9 TYPE 2 DIABETES MELLITUS WITHOUT COMPLICATION, WITHOUT LONG-TERM CURRENT USE OF INSULIN (HCC): ICD-10-CM

## 2024-09-03 DIAGNOSIS — E66.9 OBESITY (BMI 30-39.9): ICD-10-CM

## 2024-09-03 DIAGNOSIS — J40 SINOBRONCHITIS: ICD-10-CM

## 2024-09-03 DIAGNOSIS — J32.9 SINOBRONCHITIS: ICD-10-CM

## 2024-09-03 DIAGNOSIS — Z78.9 DRUG INTOLERANCE: ICD-10-CM

## 2024-09-03 DIAGNOSIS — N18.2 CKD (CHRONIC KIDNEY DISEASE) STAGE 2, GFR 60-89 ML/MIN: ICD-10-CM

## 2024-09-03 DIAGNOSIS — I10 ESSENTIAL HYPERTENSION: ICD-10-CM

## 2024-09-04 RX ORDER — GLIMEPIRIDE 4 MG/1
8 TABLET ORAL DAILY
Qty: 60 TABLET | Refills: 0 | Status: SHIPPED | OUTPATIENT
Start: 2024-09-04

## 2024-09-04 RX ORDER — FLUTICASONE PROPIONATE 50 MCG
SPRAY, SUSPENSION (ML) NASAL
Qty: 16 ML | Refills: 1 | Status: SHIPPED | OUTPATIENT
Start: 2024-09-04

## 2024-09-04 RX ORDER — EMPAGLIFLOZIN 10 MG/1
10 TABLET, FILM COATED ORAL DAILY
Qty: 30 TABLET | Refills: 0 | Status: SHIPPED | OUTPATIENT
Start: 2024-09-04

## 2024-09-28 DIAGNOSIS — J40 SINOBRONCHITIS: ICD-10-CM

## 2024-09-28 DIAGNOSIS — J32.9 SINOBRONCHITIS: ICD-10-CM

## 2024-09-29 RX ORDER — FLUTICASONE PROPIONATE 50 MCG
SPRAY, SUSPENSION (ML) NASAL
Qty: 48 ML | Refills: 1 | Status: SHIPPED | OUTPATIENT
Start: 2024-09-29

## 2025-05-22 ENCOUNTER — TELEPHONE (OUTPATIENT)
Dept: INTERNAL MEDICINE CLINIC | Facility: CLINIC | Age: 55
End: 2025-05-22

## 2025-06-18 DIAGNOSIS — I10 ESSENTIAL HYPERTENSION: ICD-10-CM

## 2025-06-18 DIAGNOSIS — E11.9 TYPE 2 DIABETES MELLITUS WITHOUT COMPLICATION, WITHOUT LONG-TERM CURRENT USE OF INSULIN (HCC): ICD-10-CM

## 2025-06-18 DIAGNOSIS — Z78.9 DRUG INTOLERANCE: ICD-10-CM

## 2025-06-28 RX ORDER — GLIMEPIRIDE 4 MG/1
8 TABLET ORAL DAILY
Qty: 60 TABLET | Refills: 0 | OUTPATIENT
Start: 2025-06-28

## (undated) DEVICE — NEEDLE SPINAL 22G X 5IN QUINCKE

## (undated) DEVICE — SPINAL NEEDLE QUINCKE 25 G X 4.69

## (undated) DEVICE — SYRINGE 5ML LL

## (undated) DEVICE — FLEXIBLE ADHESIVE BANDAGE,X-LARGE: Brand: CURITY

## (undated) DEVICE — GLOVE INDICATOR PI UNDERGLOVE SZ 8.5 BLUE

## (undated) DEVICE — PLASTIC ADHESIVE BANDAGE: Brand: CURITY

## (undated) DEVICE — TRAY EPIDURAL PERIFIX 20GA X 3.5IN TUOHY 8ML

## (undated) DEVICE — GLOVE SRG BIOGEL 8

## (undated) DEVICE — CHLORAPREP HI-LITE 10.5ML ORANGE

## (undated) DEVICE — NEEDLE SPINAL 22G X 3.5IN  QUINCKE

## (undated) DEVICE — IV EXTENSION TUBING 33 IN

## (undated) DEVICE — SYRINGE EPI 8ML LUER SLIP LOSS OF RESISTANCE PLASTIC PERFIX

## (undated) DEVICE — BANDAID SHEER SPOT